# Patient Record
Sex: FEMALE | Race: OTHER | HISPANIC OR LATINO
[De-identification: names, ages, dates, MRNs, and addresses within clinical notes are randomized per-mention and may not be internally consistent; named-entity substitution may affect disease eponyms.]

---

## 2021-11-04 ENCOUNTER — APPOINTMENT (OUTPATIENT)
Dept: ORTHOPEDIC SURGERY | Facility: CLINIC | Age: 86
End: 2021-11-04
Payer: MEDICARE

## 2021-11-04 VITALS
SYSTOLIC BLOOD PRESSURE: 159 MMHG | BODY MASS INDEX: 28.47 KG/M2 | HEART RATE: 81 BPM | DIASTOLIC BLOOD PRESSURE: 69 MMHG | WEIGHT: 145 LBS | HEIGHT: 60 IN

## 2021-11-04 DIAGNOSIS — Z87.19 PERSONAL HISTORY OF OTHER DISEASES OF THE DIGESTIVE SYSTEM: ICD-10-CM

## 2021-11-04 DIAGNOSIS — Z82.49 FAMILY HISTORY OF ISCHEMIC HEART DISEASE AND OTHER DISEASES OF THE CIRCULATORY SYSTEM: ICD-10-CM

## 2021-11-04 DIAGNOSIS — Z78.9 OTHER SPECIFIED HEALTH STATUS: ICD-10-CM

## 2021-11-04 DIAGNOSIS — Z86.79 PERSONAL HISTORY OF OTHER DISEASES OF THE CIRCULATORY SYSTEM: ICD-10-CM

## 2021-11-04 PROBLEM — Z00.00 ENCOUNTER FOR PREVENTIVE HEALTH EXAMINATION: Status: ACTIVE | Noted: 2021-11-04

## 2021-11-04 PROCEDURE — 72082 X-RAY EXAM ENTIRE SPI 2/3 VW: CPT

## 2021-11-04 PROCEDURE — 99204 OFFICE O/P NEW MOD 45 MIN: CPT

## 2021-11-04 NOTE — ASSESSMENT
[FreeTextEntry1] : I had a lengthy discussion with the patient in regards to their treatment plan and diagnosis.  They do have objective weakness findings on my exam.  Their symptoms have persisted despite the conservative management they have attempted thus far.  As a result I would like to proceed with a lumbar MRI.  In tandem with this they should begin physical therapy/home therapy program.  The patient can take Tylenol/NSAIDs as needed for pain control if medically able to.  I will have the patient follow-up in 3 to 4 weeks for repeat clinical evaluation.  I encouraged them to follow-up sooner if their symptoms worsen or change in any way.  Please note that over 45 minutes time spent in care of this patient which includes previsit preparation, in person visit, post visit documentation.

## 2021-11-04 NOTE — PHYSICAL EXAM
[de-identified] : Lumbar Physical Exam\par \par Gait -antalgic gait\par \par Station -forward pitched\par \par Sagittal balance -positive\par \par Compensatory mechanism? -Bent hips and bent knees\par \par \par \par Reflexes\par Patellar - normal\par Gastroc - normal\par Clonus - No\par \par Hip Exam - Normal\par \par Straight leg raise - none\par \par Pulses - 2+ dp/pt\par \par Range of motion - normal\par \par Sensation \par Sensation intact to light touch in L1, L2, L3, L4, L5 and S1 dermatomes bilaterally\par \par Motor\par 	IP	Quad	HS	TA	Gastroc	EHL\par Right	4/5	5/5	5/5	5/5	5/5	5/5\par Left	4/5	5/5	5/5	5/5	5/5	5/5 [de-identified] : Scoliosis radiographs\par Approximately 70 degrees of pelvic incidence\par 55 degrees of lumbar lordosis\par \par Lumbar radiographs\par Multiple areas of disc degeneration and facet arthropathy

## 2021-11-04 NOTE — HISTORY OF PRESENT ILLNESS
[de-identified] : This is an 86-year-old female that is here today for evaluation of her back and leg symptoms.  She complains of bilateral leg symptoms.  Left is worse than her right side.  She complains of left and right anterior thigh pain.  She also complains of left posterior lateral thigh posterior lateral calf pain.  She complains of intermittent cramping in her legs especially whenever she walks for any prolonged amount of time.  At this point she cannot even walk 1 block due to significant pain.  She has had these symptoms for years but over the past 1 year she has had acute worsening of her overall symptoms.  She denies any bowel bladder issues.  She denies any saddle anesthesia.

## 2021-11-09 ENCOUNTER — APPOINTMENT (OUTPATIENT)
Dept: MRI IMAGING | Facility: CLINIC | Age: 86
End: 2021-11-09
Payer: MEDICARE

## 2021-11-09 ENCOUNTER — TRANSCRIPTION ENCOUNTER (OUTPATIENT)
Age: 86
End: 2021-11-09

## 2021-11-09 ENCOUNTER — OUTPATIENT (OUTPATIENT)
Dept: OUTPATIENT SERVICES | Facility: HOSPITAL | Age: 86
LOS: 1 days | End: 2021-11-09
Payer: COMMERCIAL

## 2021-11-09 DIAGNOSIS — M54.9 DORSALGIA, UNSPECIFIED: ICD-10-CM

## 2021-11-09 PROCEDURE — 72148 MRI LUMBAR SPINE W/O DYE: CPT | Mod: 26

## 2021-11-09 PROCEDURE — 72148 MRI LUMBAR SPINE W/O DYE: CPT

## 2021-11-17 ENCOUNTER — APPOINTMENT (OUTPATIENT)
Dept: ORTHOPEDIC SURGERY | Facility: CLINIC | Age: 86
End: 2021-11-17
Payer: MEDICARE

## 2021-11-17 VITALS — BODY MASS INDEX: 28.47 KG/M2 | HEIGHT: 60 IN | WEIGHT: 145 LBS

## 2021-11-17 PROCEDURE — 99214 OFFICE O/P EST MOD 30 MIN: CPT

## 2021-11-17 NOTE — HISTORY OF PRESENT ILLNESS
[de-identified] : Today the patient states that overall her symptoms have improved.  She still does have bilateral posterior thigh posterior calf pain.  Left side is worse than her right side.  She denies any bowel bladder issues.  She denies any saddle anesthesia.  She still has decreased walking tolerance.  She is here today to discuss next steps in care.\par \par 11/04/21\par This is an 86-year-old female that is here today for evaluation of her back and leg symptoms.  She complains of bilateral leg symptoms.  Left is worse than her right side.  She complains of left and right anterior thigh pain.  She also complains of left posterior lateral thigh posterior lateral calf pain.  She complains of intermittent cramping in her legs especially whenever she walks for any prolonged amount of time.  At this point she cannot even walk 1 block due to significant pain.  She has had these symptoms for years but over the past 1 year she has had acute worsening of her overall symptoms.  She denies any bowel bladder issues.  She denies any saddle anesthesia.

## 2021-11-17 NOTE — PHYSICAL EXAM
[de-identified] : Lumbar Physical Exam\par \par Gait -antalgic gait\par \par Station -forward pitched\par \par Sagittal balance -positive\par \par Compensatory mechanism? -Bent hips and bent knees\par \par \par \par Reflexes\par Patellar - normal\par Gastroc - normal\par Clonus - No\par \par Hip Exam - Normal\par \par Straight leg raise - none\par \par Pulses - 2+ dp/pt\par \par Range of motion - normal\par \par Sensation \par Sensation intact to light touch in L1, L2, L3, L4, L5 and S1 dermatomes bilaterally\par \par Motor\par 	IP	Quad	HS	TA	Gastroc	EHL\par Right	4/5	5/5	5/5	5/5	5/5	5/5\par Left	4/5	5/5	5/5	5/5	5/5	5/5 [de-identified] : Scoliosis radiographs\par Approximately 70 degrees of pelvic incidence\par 55 degrees of lumbar lordosis\par \par Lumbar radiographs\par Multiple areas of disc degeneration and facet arthropathy\par \par Lumbar MRI\par L5-S1  spondylolisthesis\par Moderate to severe spinal stenosis at L5-S1

## 2021-11-17 NOTE — ASSESSMENT
[FreeTextEntry1] : I will lengthy discussion with the patient and the patient's family regards to her treatment options.  At this point the patient has no red flag symptoms and would like to pursue conservative management.  I think this is reasonable.  He will proceed with an interlaminar epidural steroid injection to help with her radiculopathy.  I would encourage her to continue to ambulate.  I would encourage her to work with physical therapy.  I will see her again in 4 to 6 weeks for repeat clinical evaluation.  I encouraged her to follow-up sooner if she has any new or worsening symptoms.

## 2021-12-28 ENCOUNTER — APPOINTMENT (OUTPATIENT)
Dept: INTERNAL MEDICINE | Facility: CLINIC | Age: 86
End: 2021-12-28

## 2022-01-05 ENCOUNTER — APPOINTMENT (OUTPATIENT)
Dept: ORTHOPEDIC SURGERY | Facility: CLINIC | Age: 87
End: 2022-01-05

## 2022-01-11 ENCOUNTER — NON-APPOINTMENT (OUTPATIENT)
Age: 87
End: 2022-01-11

## 2022-01-13 ENCOUNTER — APPOINTMENT (OUTPATIENT)
Dept: RADIOLOGY | Facility: CLINIC | Age: 87
End: 2022-01-13
Payer: MEDICARE

## 2022-01-13 ENCOUNTER — RESULT REVIEW (OUTPATIENT)
Age: 87
End: 2022-01-13

## 2022-01-13 ENCOUNTER — OUTPATIENT (OUTPATIENT)
Dept: OUTPATIENT SERVICES | Facility: HOSPITAL | Age: 87
LOS: 1 days | End: 2022-01-13
Payer: COMMERCIAL

## 2022-01-13 DIAGNOSIS — M54.9 DORSALGIA, UNSPECIFIED: ICD-10-CM

## 2022-01-13 PROCEDURE — 62323 NJX INTERLAMINAR LMBR/SAC: CPT

## 2022-01-13 PROCEDURE — 72275: CPT

## 2022-01-19 ENCOUNTER — APPOINTMENT (OUTPATIENT)
Dept: INTERNAL MEDICINE | Facility: CLINIC | Age: 87
End: 2022-01-19
Payer: MEDICARE

## 2022-01-19 ENCOUNTER — NON-APPOINTMENT (OUTPATIENT)
Age: 87
End: 2022-01-19

## 2022-01-19 VITALS
DIASTOLIC BLOOD PRESSURE: 74 MMHG | WEIGHT: 150 LBS | RESPIRATION RATE: 16 BRPM | HEART RATE: 75 BPM | HEIGHT: 60 IN | BODY MASS INDEX: 29.45 KG/M2 | SYSTOLIC BLOOD PRESSURE: 130 MMHG | OXYGEN SATURATION: 99 % | TEMPERATURE: 94 F

## 2022-01-19 DIAGNOSIS — Z76.89 PERSONS ENCOUNTERING HEALTH SERVICES IN OTHER SPECIFIED CIRCUMSTANCES: ICD-10-CM

## 2022-01-19 LAB
25(OH)D3 SERPL-MCNC: 35.9 NG/ML
ALBUMIN SERPL ELPH-MCNC: 4.6 G/DL
ALP BLD-CCNC: 108 U/L
ALT SERPL-CCNC: 11 U/L
ANION GAP SERPL CALC-SCNC: 13 MMOL/L
AST SERPL-CCNC: 13 U/L
BASOPHILS # BLD AUTO: 0.06 K/UL
BASOPHILS NFR BLD AUTO: 0.9 %
BILIRUB SERPL-MCNC: 0.6 MG/DL
BUN SERPL-MCNC: 14 MG/DL
CALCIUM SERPL-MCNC: 9.8 MG/DL
CHLORIDE SERPL-SCNC: 93 MMOL/L
CHOLEST SERPL-MCNC: 182 MG/DL
CO2 SERPL-SCNC: 23 MMOL/L
CREAT SERPL-MCNC: 0.73 MG/DL
EOSINOPHIL # BLD AUTO: 0.01 K/UL
EOSINOPHIL NFR BLD AUTO: 0.1 %
GLUCOSE SERPL-MCNC: 103 MG/DL
HCT VFR BLD CALC: 34.7 %
HDLC SERPL-MCNC: 53 MG/DL
HGB BLD-MCNC: 11.4 G/DL
IMM GRANULOCYTES NFR BLD AUTO: 0.3 %
LDLC SERPL CALC-MCNC: 117 MG/DL
LYMPHOCYTES # BLD AUTO: 2.41 K/UL
LYMPHOCYTES NFR BLD AUTO: 36.1 %
MAN DIFF?: NORMAL
MCHC RBC-ENTMCNC: 27.3 PG
MCHC RBC-ENTMCNC: 32.9 GM/DL
MCV RBC AUTO: 83.2 FL
MONOCYTES # BLD AUTO: 0.66 K/UL
MONOCYTES NFR BLD AUTO: 9.9 %
NEUTROPHILS # BLD AUTO: 3.52 K/UL
NEUTROPHILS NFR BLD AUTO: 52.7 %
NONHDLC SERPL-MCNC: 129 MG/DL
PLATELET # BLD AUTO: 305 K/UL
POTASSIUM SERPL-SCNC: 5.2 MMOL/L
PROT SERPL-MCNC: 7.2 G/DL
RBC # BLD: 4.17 M/UL
RBC # FLD: 14.4 %
SODIUM SERPL-SCNC: 129 MMOL/L
T4 FREE SERPL-MCNC: 1.4 NG/DL
TRIGL SERPL-MCNC: 56 MG/DL
TSH SERPL-ACNC: 2.46 UIU/ML
VIT B12 SERPL-MCNC: 474 PG/ML
WBC # FLD AUTO: 6.68 K/UL

## 2022-01-19 PROCEDURE — G0439: CPT

## 2022-01-19 RX ORDER — LOSARTAN POTASSIUM 100 MG/1
TABLET, FILM COATED ORAL
Refills: 0 | Status: DISCONTINUED | COMMUNITY
End: 2022-01-19

## 2022-01-19 NOTE — HISTORY OF PRESENT ILLNESS
[FreeTextEntry1] : establish care [de-identified] : 86 years old female here for first time to establish care changed PCP, refers h/o HTN, GASTRITIS, chronic back pain and chronic cough, refers also GERD

## 2022-01-19 NOTE — HEALTH RISK ASSESSMENT
[Good] : ~his/her~  mood as  good [Never] : Never [No] : No [No falls in past year] : Patient reported no falls in the past year [0] : 1) Little interest or pleasure doing things: Not at all (0) [1] : 2) Feeling down, depressed, or hopeless for several days (1) [PHQ-2 Negative - No further assessment needed] : PHQ-2 Negative - No further assessment needed [HIV test declined] : HIV test declined [Hepatitis C test declined] : Hepatitis C test declined [None] : None [With Family] : lives with family [# of Members in Household ___] :  household currently consist of [unfilled] member(s) [Retired] : retired [High School] : high school [] :  [Feels Safe at Home] : Feels safe at home [Fully functional (bathing, dressing, toileting, transferring, walking, feeding)] : Fully functional (bathing, dressing, toileting, transferring, walking, feeding) [Fully functional (using the telephone, shopping, preparing meals, housekeeping, doing laundry, using] : Fully functional and needs no help or supervision to perform IADLs (using the telephone, shopping, preparing meals, housekeeping, doing laundry, using transportation, managing medications and managing finances) [Reports changes in hearing] : Reports changes in hearing [Reports changes in vision] : Reports changes in vision [Smoke Detector] : smoke detector [Carbon Monoxide Detector] : carbon monoxide detector [Seat Belt] :  uses seat belt [YBD1Urial] : 1 [Change in mental status noted] : No change in mental status noted [Sexually Active] : not sexually active [Reports changes in dental health] : Reports no changes in dental health [Guns at Home] : no guns at home [de-identified] : daughter

## 2022-01-19 NOTE — ASSESSMENT
[FreeTextEntry1] : 1. annual physical exam\par * routine general labs done\par * age appropriate health maintenance recommendations reviewed, discussed including healthy diet, regular exercise\par 2. hypertension\par * continue losartan- hctz \par * low sodium diet reinforced\par 3. chronic cough\par * likely due to esophageal reflux\par * trial of benzonatate at bedtime\par 4. GERD\par * antireflux measures discussed, recommended\par * continue omeprazole 40 mg daily\par 5. chronic low back pain\par * patient had lumbar epidural injection by orthopedic spine surgeon; to follow up\par *follow up in one month for lab test results review

## 2022-01-20 LAB
APPEARANCE: CLEAR
BACTERIA: ABNORMAL
BILIRUBIN URINE: NEGATIVE
BLOOD URINE: NEGATIVE
COLOR: NORMAL
ESTIMATED AVERAGE GLUCOSE: 123 MG/DL
GLUCOSE QUALITATIVE U: NEGATIVE
HBA1C MFR BLD HPLC: 5.9 %
HYALINE CASTS: 0 /LPF
KETONES URINE: NEGATIVE
LEUKOCYTE ESTERASE URINE: NEGATIVE
MICROSCOPIC-UA: NORMAL
NITRITE URINE: NEGATIVE
PH URINE: 8
PROTEIN URINE: NEGATIVE
RED BLOOD CELLS URINE: 7 /HPF
SPECIFIC GRAVITY URINE: 1.01
SQUAMOUS EPITHELIAL CELLS: 3 /HPF
UROBILINOGEN URINE: NORMAL
WHITE BLOOD CELLS URINE: 1 /HPF

## 2022-01-25 ENCOUNTER — NON-APPOINTMENT (OUTPATIENT)
Age: 87
End: 2022-01-25

## 2022-01-25 ENCOUNTER — APPOINTMENT (OUTPATIENT)
Dept: OPHTHALMOLOGY | Facility: CLINIC | Age: 87
End: 2022-01-25
Payer: MEDICARE

## 2022-01-25 PROCEDURE — 92004 COMPRE OPH EXAM NEW PT 1/>: CPT

## 2022-01-25 PROCEDURE — 92133 CPTRZD OPH DX IMG PST SGM ON: CPT

## 2022-02-01 ENCOUNTER — APPOINTMENT (OUTPATIENT)
Dept: OPHTHALMOLOGY | Facility: CLINIC | Age: 87
End: 2022-02-01

## 2022-03-25 ENCOUNTER — APPOINTMENT (OUTPATIENT)
Dept: ORTHOPEDIC SURGERY | Facility: CLINIC | Age: 87
End: 2022-03-25
Payer: MEDICARE

## 2022-03-25 VITALS — BODY MASS INDEX: 29.45 KG/M2 | HEIGHT: 60 IN | WEIGHT: 150 LBS

## 2022-03-25 VITALS — HEIGHT: 60 IN | WEIGHT: 150 LBS | BODY MASS INDEX: 29.45 KG/M2

## 2022-03-25 PROCEDURE — 99214 OFFICE O/P EST MOD 30 MIN: CPT

## 2022-03-25 NOTE — PHYSICAL EXAM
[de-identified] : Lumbar Physical Exam\par \par Gait -antalgic gait\par \par Station -forward pitched\par \par Sagittal balance -positive\par \par Compensatory mechanism? -Bent hips and bent knees\par \par \par \par Reflexes\par Patellar - normal\par Gastroc - normal\par Clonus - No\par \par Hip Exam - Normal\par \par Straight leg raise - none\par \par Pulses - 2+ dp/pt\par \par Range of motion - normal\par \par Sensation \par Sensation intact to light touch in L1, L2, L3, L4, L5 and S1 dermatomes bilaterally\par \par Motor\par 	IP	Quad	HS	TA	Gastroc	EHL\par Right	4/5	5/5	5/5	5/5	5/5	5/5\par Left	4/5	5/5	5/5	5/5	5/5	5/5 [de-identified] : Scoliosis radiographs\par Approximately 70 degrees of pelvic incidence\par 55 degrees of lumbar lordosis\par \par Lumbar radiographs\par Multiple areas of disc degeneration and facet arthropathy\par \par Lumbar MRI\par L5-S1  spondylolisthesis\par Moderate to severe spinal stenosis at L5-S1

## 2022-03-25 NOTE — ASSESSMENT
[FreeTextEntry1] : Today I did have a lengthy discussion with the patient in regards to her treatment plan and diagnosis.  In particular we discussed both operative and nonoperative modalities of care.  She has a lot of fear in regards to surgery.  She would prefer to continue with injections.  Given her age and her symptoms I do think continued conservative management is reasonable.  I will prescribe another epidural steroid injection.  Her family inquired about getting the epidural steroid injection orders without coming to see me.  Again given her age and her overall fairly benign symptoms currently I think it is reasonable to simply place that order for injection without having her to come see me.  I did  him that if her symptoms worsen or change or if she stops getting pain relief from the injection then I think she would be better served with surgery.  Family was appreciative of this plan.  We will have her follow-up in 3 months for repeat clinical evaluation.  They know to reach out to me at any point if her symptoms worsen or change in any way.

## 2022-03-25 NOTE — HISTORY OF PRESENT ILLNESS
[de-identified] : Today the patient states that she has had a return of her lower extremity symptoms.  She does have pain which radiates down her posterior thigh bilaterally, left worse than right.  She recently had an injection in January which did help her symptoms significantly.  Unfortunately her symptoms have returned.  She denies any bowel bladder issues.  She denies any saddle anesthesia.\par \par 11/17/21\par Today the patient states that overall her symptoms have improved.  She still does have bilateral posterior thigh posterior calf pain.  Left side is worse than her right side.  She denies any bowel bladder issues.  She denies any saddle anesthesia.  She still has decreased walking tolerance.  She is here today to discuss next steps in care.\par \par 11/04/21\par This is an 86-year-old female that is here today for evaluation of her back and leg symptoms.  She complains of bilateral leg symptoms.  Left is worse than her right side.  She complains of left and right anterior thigh pain.  She also complains of left posterior lateral thigh posterior lateral calf pain.  She complains of intermittent cramping in her legs especially whenever she walks for any prolonged amount of time.  At this point she cannot even walk 1 block due to significant pain.  She has had these symptoms for years but over the past 1 year she has had acute worsening of her overall symptoms.  She denies any bowel bladder issues.  She denies any saddle anesthesia.

## 2022-03-30 ENCOUNTER — APPOINTMENT (OUTPATIENT)
Dept: PAIN MANAGEMENT | Facility: CLINIC | Age: 87
End: 2022-03-30

## 2022-04-12 ENCOUNTER — NON-APPOINTMENT (OUTPATIENT)
Age: 87
End: 2022-04-12

## 2022-04-12 ENCOUNTER — RESULT REVIEW (OUTPATIENT)
Age: 87
End: 2022-04-12

## 2022-04-12 ENCOUNTER — APPOINTMENT (OUTPATIENT)
Dept: RADIOLOGY | Facility: CLINIC | Age: 87
End: 2022-04-12
Payer: MEDICARE

## 2022-04-12 ENCOUNTER — OUTPATIENT (OUTPATIENT)
Dept: OUTPATIENT SERVICES | Facility: HOSPITAL | Age: 87
LOS: 1 days | End: 2022-04-12
Payer: COMMERCIAL

## 2022-04-12 DIAGNOSIS — M54.9 DORSALGIA, UNSPECIFIED: ICD-10-CM

## 2022-04-12 DIAGNOSIS — Z00.8 ENCOUNTER FOR OTHER GENERAL EXAMINATION: ICD-10-CM

## 2022-04-12 PROCEDURE — 62323 NJX INTERLAMINAR LMBR/SAC: CPT

## 2022-05-19 ENCOUNTER — APPOINTMENT (OUTPATIENT)
Dept: SURGERY | Facility: CLINIC | Age: 87
End: 2022-05-19
Payer: MEDICARE

## 2022-05-19 VITALS
HEIGHT: 60 IN | SYSTOLIC BLOOD PRESSURE: 167 MMHG | HEART RATE: 71 BPM | BODY MASS INDEX: 31.8 KG/M2 | DIASTOLIC BLOOD PRESSURE: 86 MMHG | OXYGEN SATURATION: 98 % | WEIGHT: 162 LBS

## 2022-05-19 PROCEDURE — 99204 OFFICE O/P NEW MOD 45 MIN: CPT

## 2022-05-23 ENCOUNTER — APPOINTMENT (OUTPATIENT)
Dept: GASTROENTEROLOGY | Facility: CLINIC | Age: 87
End: 2022-05-23
Payer: MEDICARE

## 2022-05-23 DIAGNOSIS — R11.10 VOMITING, UNSPECIFIED: ICD-10-CM

## 2022-05-23 PROCEDURE — 99204 OFFICE O/P NEW MOD 45 MIN: CPT

## 2022-05-23 NOTE — REASON FOR VISIT
[Initial Consultation] : an initial consultation for the evaluation of [Abdominal Bloating and Discomfort] : abdominal bloating and discomfort [GERD] : gastroesophageal reflux disease [Family Member] : family member [Other: _____] : [unfilled] [FreeTextEntry2] : cough

## 2022-05-23 NOTE — CONSULT LETTER
[Dear  ___] : Dear  [unfilled], [Consult Letter:] : I had the pleasure of evaluating your patient, [unfilled]. [Consult Closing:] : Thank you very much for allowing me to participate in the care of this patient.  If you have any questions, please do not hesitate to contact me. [Sincerely,] : Sincerely, [FreeTextEntry1] : Impression: The patient is a 86 year old woman who is referred for an initial Gastroenterology motility consultation for the evaluation of progressive typical reflux symptoms, including heartburn and regurgitation. \par \par Plan:\par \par 1) Gastroenterology: Ms. DOMINGUEZ's symptoms are typical of gastroesophageal reflux disease and she has had some response to acid suppressive therapy in the past, which is further suggestive of ongoing gastroesophageal reflux.\par \par At the present time, we will proceed with scheduling an EGD. We will plan to place a Bravo pH monitor at that time to objectively define if reflux is present and if so, to what degree.  We will perform Bravo pH testing off of PPI therapy, which will be held for 5-7 days.\par \par If the patient is willing, we may also consider HRM with 24 hr pH/Impedance testing, as we will not only be able to objectively quantify the degree of reflux, but will further be able to evaluate impedance and non-acid reflux episodes as a contributor to his symptoms, which we will not be able to do based on Bravo pH testing alone.\par \par At today's visit, we also extensively counseled her on dietary/lifestyle management for presumed GERD.  We have also provided her with an educational handout with detailed information.\par \par 2) Disposition: Ms. DOMINGUEZ will return to see me in 4-6 weeks after the above studies are obtained. My office will be in touch with her as results become available.  \par \par In the meantime, she should not hesitate to contact my office with any additional questions. [FreeTextEntry3] : Sweta Astorga MD\par Gastroenterology, Hepatology and Motility\par

## 2022-06-02 ENCOUNTER — RESULT REVIEW (OUTPATIENT)
Age: 87
End: 2022-06-02

## 2022-06-02 ENCOUNTER — OUTPATIENT (OUTPATIENT)
Dept: OUTPATIENT SERVICES | Facility: HOSPITAL | Age: 87
LOS: 1 days | End: 2022-06-02
Payer: COMMERCIAL

## 2022-06-02 DIAGNOSIS — K21.9 GASTRO-ESOPHAGEAL REFLUX DISEASE WITHOUT ESOPHAGITIS: ICD-10-CM

## 2022-06-02 PROCEDURE — 74220 X-RAY XM ESOPHAGUS 1CNTRST: CPT

## 2022-06-02 PROCEDURE — 74220 X-RAY XM ESOPHAGUS 1CNTRST: CPT | Mod: 26

## 2022-06-08 ENCOUNTER — NON-APPOINTMENT (OUTPATIENT)
Age: 87
End: 2022-06-08

## 2022-06-08 ENCOUNTER — APPOINTMENT (OUTPATIENT)
Dept: CARDIOLOGY | Facility: CLINIC | Age: 87
End: 2022-06-08
Payer: MEDICARE

## 2022-06-08 VITALS
SYSTOLIC BLOOD PRESSURE: 163 MMHG | HEART RATE: 79 BPM | OXYGEN SATURATION: 98 % | WEIGHT: 164 LBS | DIASTOLIC BLOOD PRESSURE: 80 MMHG | BODY MASS INDEX: 32.2 KG/M2 | HEIGHT: 60 IN

## 2022-06-08 VITALS — SYSTOLIC BLOOD PRESSURE: 140 MMHG | DIASTOLIC BLOOD PRESSURE: 78 MMHG

## 2022-06-08 DIAGNOSIS — R06.00 DYSPNEA, UNSPECIFIED: ICD-10-CM

## 2022-06-08 PROCEDURE — 93000 ELECTROCARDIOGRAM COMPLETE: CPT

## 2022-06-08 PROCEDURE — 99204 OFFICE O/P NEW MOD 45 MIN: CPT | Mod: 25

## 2022-06-08 NOTE — DISCUSSION/SUMMARY
[FreeTextEntry1] : 86 year woman with a history as listed presents for an initial cardiac evaluation. \par Shirin is complaining of a chronic cough. She may be having a reaction to her losartan. She will stop this. She previously had lower extremity edema to Norvasc. She will start on Bystolic 10mg Qday. She will continue HCTZ. \par SHe has HUTCHINSON which is likely from deconditioning. Her EKG did not reveal any significant ischemic changes. She will undergo a pharmacological nuclear stress test to assess for underlying obstructive CAD. She will get a 2d echo to assess for any  new structural heart disease, changes in valvular and ventricular function. \par Exercise and diet counseling was performed in order to reduce her future cardiovascular risk.\par She will followup with me in 3 months or sooner if necessary.

## 2022-06-08 NOTE — HISTORY OF PRESENT ILLNESS
[FreeTextEntry1] : 86 year old woman with a history of HTN, sciatica, GERD presents for an initial cardiac evaluation. \par \par She has been complaining of a chronic cough that has been going on for years. She has been having fits of coughing daily. \par She is is relatively sedentary because of her spinal disease. She complains  of HUTCHINSON. She   denies any chest pain, PND, orthopnea, lower extremity edema, near syncope, syncope, strokelike symptoms. Medication reconciliation performed. She is compliant with her medications.

## 2022-06-13 ENCOUNTER — NON-APPOINTMENT (OUTPATIENT)
Age: 87
End: 2022-06-13

## 2022-06-13 ENCOUNTER — APPOINTMENT (OUTPATIENT)
Dept: GASTROENTEROLOGY | Facility: CLINIC | Age: 87
End: 2022-06-13
Payer: MEDICARE

## 2022-06-13 ENCOUNTER — APPOINTMENT (OUTPATIENT)
Dept: PULMONOLOGY | Facility: CLINIC | Age: 87
End: 2022-06-13

## 2022-06-13 DIAGNOSIS — Z01.812 ENCOUNTER FOR PREPROCEDURAL LABORATORY EXAMINATION: ICD-10-CM

## 2022-06-13 PROCEDURE — 99443: CPT

## 2022-06-16 ENCOUNTER — APPOINTMENT (OUTPATIENT)
Dept: CARDIOLOGY | Facility: CLINIC | Age: 87
End: 2022-06-16
Payer: MEDICARE

## 2022-06-16 PROCEDURE — 93306 TTE W/DOPPLER COMPLETE: CPT

## 2022-06-17 ENCOUNTER — NON-APPOINTMENT (OUTPATIENT)
Age: 87
End: 2022-06-17

## 2022-06-20 ENCOUNTER — APPOINTMENT (OUTPATIENT)
Dept: CARDIOLOGY | Facility: CLINIC | Age: 87
End: 2022-06-20
Payer: MEDICARE

## 2022-06-20 PROCEDURE — 78452 HT MUSCLE IMAGE SPECT MULT: CPT

## 2022-06-20 PROCEDURE — 93015 CV STRESS TEST SUPVJ I&R: CPT

## 2022-06-20 PROCEDURE — A9500: CPT

## 2022-06-27 ENCOUNTER — RX RENEWAL (OUTPATIENT)
Age: 87
End: 2022-06-27

## 2022-07-06 LAB — SARS-COV-2 N GENE NPH QL NAA+PROBE: NOT DETECTED

## 2022-07-08 ENCOUNTER — APPOINTMENT (OUTPATIENT)
Dept: GASTROENTEROLOGY | Facility: HOSPITAL | Age: 87
End: 2022-07-08

## 2022-07-08 ENCOUNTER — TRANSCRIPTION ENCOUNTER (OUTPATIENT)
Age: 87
End: 2022-07-08

## 2022-07-08 ENCOUNTER — OUTPATIENT (OUTPATIENT)
Dept: OUTPATIENT SERVICES | Facility: HOSPITAL | Age: 87
LOS: 1 days | End: 2022-07-08
Payer: COMMERCIAL

## 2022-07-08 VITALS
HEIGHT: 60 IN | TEMPERATURE: 98 F | HEART RATE: 67 BPM | RESPIRATION RATE: 16 BRPM | SYSTOLIC BLOOD PRESSURE: 187 MMHG | WEIGHT: 160.06 LBS | OXYGEN SATURATION: 98 % | DIASTOLIC BLOOD PRESSURE: 77 MMHG

## 2022-07-08 DIAGNOSIS — R05.3 CHRONIC COUGH: ICD-10-CM

## 2022-07-08 DIAGNOSIS — K21.9 GASTRO-ESOPHAGEAL REFLUX DISEASE WITHOUT ESOPHAGITIS: ICD-10-CM

## 2022-07-08 PROCEDURE — 91010 ESOPHAGUS MOTILITY STUDY: CPT

## 2022-07-08 PROCEDURE — 91037 ESOPH IMPED FUNCTION TEST: CPT

## 2022-07-08 PROCEDURE — 91037 ESOPH IMPED FUNCTION TEST: CPT | Mod: 26

## 2022-07-08 PROCEDURE — 91010 ESOPHAGUS MOTILITY STUDY: CPT | Mod: 26

## 2022-07-08 NOTE — ASU DISCHARGE PLAN (ADULT/PEDIATRIC) - HISTORY OF COVID-19 VACCINATION
Patient has the following symptoms: dizziness/ patient fell,  caught  The symptoms have been present for 2 weeks    Pharmacy has been verified.      hard to understand  
Spoke with pt's . Pt continues to feel dizziness \"room is spinning\".   Pt had a dizzy spell where she felt like she was going to fall.  caught patient, denies hitting head.   states patient currently feels okay.  Pt seen in ED for dizziness and left shoulder pain. Pt had a normal CT of chest, EKG, and labs.  Advised to see PCP if dizziness not better. Appointment made for Thursday at 1000.  Offered earlier appointment, but  wanted a morning appointment.     
No

## 2022-07-08 NOTE — ASU PATIENT PROFILE, ADULT - FALL HARM RISK - HARM RISK INTERVENTIONS

## 2022-07-08 NOTE — ASU PATIENT PROFILE, ADULT - ADDITIONAL COMMENTS
patient refused  . would rather daughter patient used  for procedure only . Daughter was  pre procedure.

## 2022-07-08 NOTE — ASU PATIENT PROFILE, ADULT - NS TRANSFER PATIENT BELONGINGS
with patient in pocketbook/Cell Phone/PDA (specify)/Jewelry/Money (specify)/Other belongings/Clothing

## 2022-07-08 NOTE — PRE PROCEDURE NOTE - PRE PROCEDURE EVALUATION
Attending Physician:              Tano Floyd MD MSEd  Performing provider:            ERNIE Mathis  Indication for Procedure:      Chronic GERD                PAST MEDICAL & SURGICAL HISTORY:        See Allscripts Note for further details  ALLERGIES:  No Known Allergies    HOME MEDICATIONS:      See Allscripts Note for further details    AICD/PPM: [ ] yes   [ ] no    PERTINENT LAB DATA:                      PHYSICAL EXAMINATION:    T(C): --  HR: --  BP: --  RR: --  SpO2: --    Constitutional: NAD  HEENT: PERRLA, EOMI,    Neck:  No JVD  Respiratory: CTAB/L  Cardiovascular: S1 and S2  Gastrointestinal: BS+, soft, NT/ND  Extremities: No peripheral edema  Neurological: A/O x 3, no focal deficits  Psychiatric: Normal mood, normal affect  Skin: No rashes    ASA Class: I [ ]  II [X ]  III [ ]  IV [ ]    COMMENTS:    The patient is a suitable candidate for the planned procedure unless box checked [ ]  No, explain:

## 2022-07-08 NOTE — ASU PATIENT PROFILE, ADULT - LANGUAGE ASSISTANCE NEEDED
No-Patient/Caregiver offered and refused free interpretation services. Patient did use  for procedure. [id number] [place].  during procedure./No-Patient/Caregiver offered and refused free interpretation services.

## 2022-07-11 ENCOUNTER — APPOINTMENT (OUTPATIENT)
Dept: PULMONOLOGY | Facility: CLINIC | Age: 87
End: 2022-07-11

## 2022-07-11 VITALS
HEART RATE: 74 BPM | SYSTOLIC BLOOD PRESSURE: 168 MMHG | HEIGHT: 60 IN | OXYGEN SATURATION: 98 % | BODY MASS INDEX: 32.2 KG/M2 | WEIGHT: 164 LBS | DIASTOLIC BLOOD PRESSURE: 84 MMHG

## 2022-07-11 DIAGNOSIS — R05.3 CHRONIC COUGH: ICD-10-CM

## 2022-07-11 PROBLEM — I10 ESSENTIAL (PRIMARY) HYPERTENSION: Chronic | Status: ACTIVE | Noted: 2022-07-08

## 2022-07-11 PROBLEM — K21.9 GASTRO-ESOPHAGEAL REFLUX DISEASE WITHOUT ESOPHAGITIS: Chronic | Status: ACTIVE | Noted: 2022-07-08

## 2022-07-11 PROCEDURE — 94010 BREATHING CAPACITY TEST: CPT

## 2022-07-11 PROCEDURE — ZZZZZ: CPT

## 2022-07-11 PROCEDURE — 99204 OFFICE O/P NEW MOD 45 MIN: CPT | Mod: 25

## 2022-07-11 PROCEDURE — 71046 X-RAY EXAM CHEST 2 VIEWS: CPT

## 2022-07-11 RX ORDER — BENZONATATE 100 MG/1
100 CAPSULE ORAL 3 TIMES DAILY
Qty: 21 | Refills: 0 | Status: DISCONTINUED | COMMUNITY
Start: 2022-01-19 | End: 2022-07-11

## 2022-07-11 RX ORDER — DICLOFENAC SODIUM 1% 10 MG/G
1 GEL TOPICAL DAILY
Qty: 1 | Refills: 0 | Status: DISCONTINUED | COMMUNITY
Start: 2022-03-25 | End: 2022-07-11

## 2022-07-11 NOTE — ASSESSMENT
[FreeTextEntry1] : I suggest she make the changes as discussed with her cardiologist and we can reassess in 2 mo. if cough has not improved.\par \par cont ppi as well

## 2022-07-11 NOTE — PROCEDURE
[FreeTextEntry1] : spirometry: had difficulty with exam but overall appears normal.\par \par CXR: clear lungs, no focal consolidation or pleural effusions, cardiac silhouette appears normal.  No bony abnormality.\par

## 2022-07-11 NOTE — CONSULT LETTER
[FreeTextEntry1] : Dear  ,\par \par I had the pleasure of evaluating your patient, BASSEM DOMINGUEZ today in pulmonary consultation.  Please refer to my attached note for my findings and recommendations.\par \par \par Thank you for allowing me to participate in the care of your patient, please feel free to call with any questions or concerns.\par \par \par Sincerely,\par \par Humera Angulo MD\par [facility] \par [facility]\par \par

## 2022-07-11 NOTE — HISTORY OF PRESENT ILLNESS
[Never] : never [TextBox_4] : 86F with HTN and Gerd, hiatal hernia\par \par c/o chronic cough\par no obvious trigger\par no cp/wheeze/lopez\par reports she is on losartan and omeprazole but per my chart review, during her june 8th visit with cardiology it was suggested she stop losartan bc of the cough and start bystolic and hctz.  She states she made the swithc but only for 2 days bc she felt like she was coughing more so went back to losartan only.

## 2022-07-12 ENCOUNTER — NON-APPOINTMENT (OUTPATIENT)
Age: 87
End: 2022-07-12

## 2022-08-01 LAB — SARS-COV-2 N GENE NPH QL NAA+PROBE: NOT DETECTED

## 2022-08-03 ENCOUNTER — RESULT REVIEW (OUTPATIENT)
Age: 87
End: 2022-08-03

## 2022-08-03 ENCOUNTER — APPOINTMENT (OUTPATIENT)
Dept: GASTROENTEROLOGY | Facility: HOSPITAL | Age: 87
End: 2022-08-03

## 2022-08-03 ENCOUNTER — OUTPATIENT (OUTPATIENT)
Dept: OUTPATIENT SERVICES | Facility: HOSPITAL | Age: 87
LOS: 1 days | End: 2022-08-03
Payer: COMMERCIAL

## 2022-08-03 ENCOUNTER — TRANSCRIPTION ENCOUNTER (OUTPATIENT)
Age: 87
End: 2022-08-03

## 2022-08-03 VITALS — HEART RATE: 60 BPM | DIASTOLIC BLOOD PRESSURE: 68 MMHG | SYSTOLIC BLOOD PRESSURE: 161 MMHG | RESPIRATION RATE: 12 BRPM

## 2022-08-03 VITALS
RESPIRATION RATE: 18 BRPM | OXYGEN SATURATION: 98 % | TEMPERATURE: 98 F | WEIGHT: 160.06 LBS | SYSTOLIC BLOOD PRESSURE: 148 MMHG | HEART RATE: 59 BPM | HEIGHT: 59 IN | DIASTOLIC BLOOD PRESSURE: 76 MMHG

## 2022-08-03 DIAGNOSIS — S82.892A OTHER FRACTURE OF LEFT LOWER LEG, INITIAL ENCOUNTER FOR CLOSED FRACTURE: Chronic | ICD-10-CM

## 2022-08-03 DIAGNOSIS — K21.9 GASTRO-ESOPHAGEAL REFLUX DISEASE WITHOUT ESOPHAGITIS: ICD-10-CM

## 2022-08-03 DIAGNOSIS — R05.3 CHRONIC COUGH: ICD-10-CM

## 2022-08-03 PROCEDURE — 88305 TISSUE EXAM BY PATHOLOGIST: CPT | Mod: 26

## 2022-08-03 PROCEDURE — 43239 EGD BIOPSY SINGLE/MULTIPLE: CPT

## 2022-08-03 PROCEDURE — C1889: CPT

## 2022-08-03 PROCEDURE — 88305 TISSUE EXAM BY PATHOLOGIST: CPT

## 2022-08-03 DEVICE — NET RETRV ROT ROTH 2.5MMX230CM: Type: IMPLANTABLE DEVICE | Status: FUNCTIONAL

## 2022-08-03 DEVICE — KIT CVC 2LUM MAC 9FR CHG: Type: IMPLANTABLE DEVICE | Status: FUNCTIONAL

## 2022-08-03 DEVICE — IMPLANTABLE DEVICE: Type: IMPLANTABLE DEVICE | Status: FUNCTIONAL

## 2022-08-03 DEVICE — CATH THERMODIL PACE 7.5FR: Type: IMPLANTABLE DEVICE | Status: FUNCTIONAL

## 2022-08-03 DEVICE — KIT A-LINE 1LUM 20G X 12CM SAFE KIT: Type: IMPLANTABLE DEVICE | Status: FUNCTIONAL

## 2022-08-03 RX ORDER — LIDOCAINE HCL 20 MG/ML
4 VIAL (ML) INJECTION ONCE
Refills: 0 | Status: DISCONTINUED | OUTPATIENT
Start: 2022-08-03 | End: 2022-08-18

## 2022-08-03 RX ORDER — SODIUM CHLORIDE 9 MG/ML
500 INJECTION INTRAMUSCULAR; INTRAVENOUS; SUBCUTANEOUS
Refills: 0 | Status: DISCONTINUED | OUTPATIENT
Start: 2022-08-03 | End: 2022-08-18

## 2022-08-03 RX ORDER — LOSARTAN POTASSIUM 100 MG/1
12.5 TABLET, FILM COATED ORAL
Qty: 0 | Refills: 0 | DISCHARGE

## 2022-08-03 NOTE — ASU PATIENT PROFILE, ADULT - FALL HARM RISK - UNIVERSAL INTERVENTIONS
Bed in lowest position, wheels locked, appropriate side rails in place/Call bell, personal items and telephone in reach/Instruct patient to call for assistance before getting out of bed or chair/Non-slip footwear when patient is out of bed/[place] to call system/Physically safe environment - no spills, clutter or unnecessary equipment/Purposeful Proactive Rounding/Room/bathroom lighting operational, light cord in reach

## 2022-08-03 NOTE — ASU DISCHARGE PLAN (ADULT/PEDIATRIC) - NS MD DC FALL RISK RISK
For information on Fall & Injury Prevention, visit: [email or web address] OR  [email or web address] OR  https://www.cdc.gov/steadi/patient.html

## 2022-08-03 NOTE — ASU PATIENT PROFILE, ADULT - NSICDXPASTMEDICALHX_GEN_ALL_CORE_FT
PAST MEDICAL HISTORY:  Ankle fracture, left     GERD (gastroesophageal reflux disease)     Hypertension

## 2022-08-03 NOTE — PRE PROCEDURE NOTE - PRE PROCEDURE EVALUATION
Attending Physician:              Tano Floyd MD MSEd    Indication for Procedure      GERD          PAST MEDICAL & SURGICAL HISTORY:  Hypertension      GERD (gastroesophageal reflux disease)      Ankle fracture, left      Ankle fracture, left  metal            See Allscripts Note for further details  ALLERGIES:  No Known Allergies    HOME MEDICATIONS:  hydroCHLOROthiazide 25 mg oral tablet: 1 tab(s) orally once a day  Nebivolol 10 mg oral tablet: 1 tab(s) orally once a day  omeprazole 40 mg oral delayed release capsule: 1 cap(s) orally once a day      See Allscripts Note for further details    AICD/PPM: [ ] yes   [x ] no    PERTINENT LAB DATA:                      PHYSICAL EXAMINATION:    Height (cm): 149.9  Weight (kg): 72.6  BMI (kg/m2): 32.3  BSA (m2): 1.68T(C): 36.4  HR: 59  BP: 148/76  RR: 18  SpO2: 98%    Constitutional: NAD  HEENT: PERRLA, EOMI,    Neck:  No JVD  Respiratory: CTAB/L  Cardiovascular: S1 and S2  Gastrointestinal: BS+, soft, NT/ND  Extremities: No peripheral edema  Neurological: A/O x 3, no focal deficits  Psychiatric: Normal mood, normal affect  Skin: No rashes    ASA Class: I [ ]  II [ ]  III [ x]  IV [ ]    COMMENTS:    The patient is a suitable candidate for the planned procedure unless box checked [ ]  No, explain:

## 2022-08-05 LAB — SURGICAL PATHOLOGY STUDY: SIGNIFICANT CHANGE UP

## 2022-08-05 PROCEDURE — 91035 G-ESOPH REFLX TST W/ELECTROD: CPT | Mod: 26

## 2022-08-16 ENCOUNTER — APPOINTMENT (OUTPATIENT)
Dept: SURGERY | Facility: CLINIC | Age: 87
End: 2022-08-16

## 2022-08-16 PROCEDURE — 99215 OFFICE O/P EST HI 40 MIN: CPT

## 2022-08-18 PROBLEM — S82.892A OTHER FRACTURE OF LEFT LOWER LEG, INITIAL ENCOUNTER FOR CLOSED FRACTURE: Chronic | Status: ACTIVE | Noted: 2022-08-03

## 2022-08-30 ENCOUNTER — APPOINTMENT (OUTPATIENT)
Dept: SURGERY | Facility: CLINIC | Age: 87
End: 2022-08-30

## 2022-08-30 VITALS
BODY MASS INDEX: 32.2 KG/M2 | HEIGHT: 60 IN | OXYGEN SATURATION: 98 % | DIASTOLIC BLOOD PRESSURE: 76 MMHG | WEIGHT: 164 LBS | SYSTOLIC BLOOD PRESSURE: 173 MMHG | HEART RATE: 59 BPM

## 2022-08-30 PROCEDURE — 99214 OFFICE O/P EST MOD 30 MIN: CPT

## 2022-09-01 ENCOUNTER — APPOINTMENT (OUTPATIENT)
Dept: ORTHOPEDIC SURGERY | Facility: CLINIC | Age: 87
End: 2022-09-01

## 2022-09-01 VITALS
DIASTOLIC BLOOD PRESSURE: 90 MMHG | SYSTOLIC BLOOD PRESSURE: 164 MMHG | HEIGHT: 60 IN | BODY MASS INDEX: 31.41 KG/M2 | WEIGHT: 160 LBS | HEART RATE: 64 BPM

## 2022-09-01 PROCEDURE — 99214 OFFICE O/P EST MOD 30 MIN: CPT

## 2022-09-01 NOTE — ASSESSMENT
[FreeTextEntry1] : I had a long discussion with the patient regards to her treatment plan and diagnosis.  At this point I would encourage her to continue with physical therapy new orders been placed today.  I will have her get a new epidural steroid injection as well.  She can follow-up with me in approximately 4 to 6 weeks.  All questions were answered

## 2022-09-01 NOTE — PHYSICAL EXAM
[de-identified] : Lumbar Physical Exam\par \par Gait -antalgic gait\par \par Station -forward pitched\par \par Sagittal balance -positive\par \par Compensatory mechanism? -Bent hips and bent knees\par \par \par \par Reflexes\par Patellar - normal\par Gastroc - normal\par Clonus - No\par \par Hip Exam - Normal\par \par Straight leg raise - none\par \par Pulses - 2+ dp/pt\par \par Range of motion - normal\par \par Sensation \par Sensation intact to light touch in L1, L2, L3, L4, L5 and S1 dermatomes bilaterally\par \par Motor\par 	IP	Quad	HS	TA	Gastroc	EHL\par Right	4/5	5/5	5/5	5/5	5/5	5/5\par Left	4/5	5/5	5/5	5/5	5/5	5/5 [de-identified] : Scoliosis radiographs\par Approximately 70 degrees of pelvic incidence\par 55 degrees of lumbar lordosis\par \par Lumbar radiographs\par Multiple areas of disc degeneration and facet arthropathy\par \par Lumbar MRI\par L5-S1  spondylolisthesis\par Moderate to severe spinal stenosis at L5-S1

## 2022-09-01 NOTE — HISTORY OF PRESENT ILLNESS
[de-identified] : Today the patient states that she is still dealing with some fairly significant back and left lower extremity symptoms.  She does have pain which radiates into her left medial thigh as well as her left lateral thigh, posterior lateral calf.  She does state that the symptoms can be disabling at times.  They are largely unchanged since her last visit.  Unfortunate her last epidural steroid injection did not substantially improve her symptoms.  She is here today to discuss next steps in treatment.\par \par 03/25/22\par Today the patient states that she has had a return of her lower extremity symptoms.  She does have pain which radiates down her posterior thigh bilaterally, left worse than right.  She recently had an injection in January which did help her symptoms significantly.  Unfortunately her symptoms have returned.  She denies any bowel bladder issues.  She denies any saddle anesthesia.\par \par 11/17/21\par Today the patient states that overall her symptoms have improved.  She still does have bilateral posterior thigh posterior calf pain.  Left side is worse than her right side.  She denies any bowel bladder issues.  She denies any saddle anesthesia.  She still has decreased walking tolerance.  She is here today to discuss next steps in care.\par \par 11/04/21\par This is an 86-year-old female that is here today for evaluation of her back and leg symptoms.  She complains of bilateral leg symptoms.  Left is worse than her right side.  She complains of left and right anterior thigh pain.  She also complains of left posterior lateral thigh posterior lateral calf pain.  She complains of intermittent cramping in her legs especially whenever she walks for any prolonged amount of time.  At this point she cannot even walk 1 block due to significant pain.  She has had these symptoms for years but over the past 1 year she has had acute worsening of her overall symptoms.  She denies any bowel bladder issues.  She denies any saddle anesthesia.

## 2022-09-07 ENCOUNTER — NON-APPOINTMENT (OUTPATIENT)
Age: 87
End: 2022-09-07

## 2022-09-07 ENCOUNTER — APPOINTMENT (OUTPATIENT)
Dept: CARDIOLOGY | Facility: CLINIC | Age: 87
End: 2022-09-07

## 2022-09-12 ENCOUNTER — APPOINTMENT (OUTPATIENT)
Dept: PULMONOLOGY | Facility: CLINIC | Age: 87
End: 2022-09-12

## 2022-09-23 ENCOUNTER — OUTPATIENT (OUTPATIENT)
Dept: OUTPATIENT SERVICES | Facility: HOSPITAL | Age: 87
LOS: 1 days | End: 2022-09-23
Payer: COMMERCIAL

## 2022-09-23 VITALS
HEIGHT: 55 IN | DIASTOLIC BLOOD PRESSURE: 62 MMHG | HEART RATE: 62 BPM | WEIGHT: 162.04 LBS | SYSTOLIC BLOOD PRESSURE: 146 MMHG | TEMPERATURE: 98 F | RESPIRATION RATE: 14 BRPM | OXYGEN SATURATION: 98 %

## 2022-09-23 DIAGNOSIS — Z01.818 ENCOUNTER FOR OTHER PREPROCEDURAL EXAMINATION: ICD-10-CM

## 2022-09-23 DIAGNOSIS — K21.9 GASTRO-ESOPHAGEAL REFLUX DISEASE WITHOUT ESOPHAGITIS: ICD-10-CM

## 2022-09-23 DIAGNOSIS — Z98.890 OTHER SPECIFIED POSTPROCEDURAL STATES: Chronic | ICD-10-CM

## 2022-09-23 DIAGNOSIS — S82.892A OTHER FRACTURE OF LEFT LOWER LEG, INITIAL ENCOUNTER FOR CLOSED FRACTURE: Chronic | ICD-10-CM

## 2022-09-23 DIAGNOSIS — Z90.89 ACQUIRED ABSENCE OF OTHER ORGANS: Chronic | ICD-10-CM

## 2022-09-23 LAB
ALBUMIN SERPL ELPH-MCNC: 4 G/DL — SIGNIFICANT CHANGE UP (ref 3.3–5)
ALP SERPL-CCNC: 105 U/L — SIGNIFICANT CHANGE UP (ref 40–120)
ALT FLD-CCNC: 17 U/L — SIGNIFICANT CHANGE UP (ref 12–78)
ANION GAP SERPL CALC-SCNC: 7 MMOL/L — SIGNIFICANT CHANGE UP (ref 5–17)
AST SERPL-CCNC: 12 U/L — LOW (ref 15–37)
BILIRUB SERPL-MCNC: 0.3 MG/DL — SIGNIFICANT CHANGE UP (ref 0.2–1.2)
BUN SERPL-MCNC: 15 MG/DL — SIGNIFICANT CHANGE UP (ref 7–23)
CALCIUM SERPL-MCNC: 9.4 MG/DL — SIGNIFICANT CHANGE UP (ref 8.5–10.1)
CHLORIDE SERPL-SCNC: 94 MMOL/L — LOW (ref 96–108)
CO2 SERPL-SCNC: 27 MMOL/L — SIGNIFICANT CHANGE UP (ref 22–31)
CREAT SERPL-MCNC: 0.59 MG/DL — SIGNIFICANT CHANGE UP (ref 0.5–1.3)
EGFR: 88 ML/MIN/1.73M2 — SIGNIFICANT CHANGE UP
GLUCOSE SERPL-MCNC: 106 MG/DL — HIGH (ref 70–99)
HCT VFR BLD CALC: 33.1 % — LOW (ref 34.5–45)
HGB BLD-MCNC: 11.2 G/DL — LOW (ref 11.5–15.5)
MCHC RBC-ENTMCNC: 28.3 PG — SIGNIFICANT CHANGE UP (ref 27–34)
MCHC RBC-ENTMCNC: 33.8 GM/DL — SIGNIFICANT CHANGE UP (ref 32–36)
MCV RBC AUTO: 83.6 FL — SIGNIFICANT CHANGE UP (ref 80–100)
NRBC # BLD: 0 /100 WBCS — SIGNIFICANT CHANGE UP (ref 0–0)
PLATELET # BLD AUTO: 236 K/UL — SIGNIFICANT CHANGE UP (ref 150–400)
POTASSIUM SERPL-MCNC: 4.7 MMOL/L — SIGNIFICANT CHANGE UP (ref 3.5–5.3)
POTASSIUM SERPL-SCNC: 4.7 MMOL/L — SIGNIFICANT CHANGE UP (ref 3.5–5.3)
PROT SERPL-MCNC: 7.3 G/DL — SIGNIFICANT CHANGE UP (ref 6–8.3)
RBC # BLD: 3.96 M/UL — SIGNIFICANT CHANGE UP (ref 3.8–5.2)
RBC # FLD: 13.6 % — SIGNIFICANT CHANGE UP (ref 10.3–14.5)
SODIUM SERPL-SCNC: 128 MMOL/L — LOW (ref 135–145)
WBC # BLD: 7.05 K/UL — SIGNIFICANT CHANGE UP (ref 3.8–10.5)
WBC # FLD AUTO: 7.05 K/UL — SIGNIFICANT CHANGE UP (ref 3.8–10.5)

## 2022-09-23 PROCEDURE — 86901 BLOOD TYPING SEROLOGIC RH(D): CPT

## 2022-09-23 PROCEDURE — 86900 BLOOD TYPING SEROLOGIC ABO: CPT

## 2022-09-23 PROCEDURE — 85027 COMPLETE CBC AUTOMATED: CPT

## 2022-09-23 PROCEDURE — 86850 RBC ANTIBODY SCREEN: CPT

## 2022-09-23 PROCEDURE — 80053 COMPREHEN METABOLIC PANEL: CPT

## 2022-09-23 PROCEDURE — G0463: CPT

## 2022-09-23 PROCEDURE — 36415 COLL VENOUS BLD VENIPUNCTURE: CPT

## 2022-09-23 NOTE — H&P PST ADULT - GENERAL COMMENTS
Denies any travel in the last  weeks - denies any recent exposure to anyone with known or suspected covid - denies any current covid symptoms

## 2022-09-23 NOTE — H&P PST ADULT - HISTORY OF PRESENT ILLNESS
87 year old Upper sorbian speaking female PMH Benign Essential Hypertension, Chronic Cough, Chronic GERD, Chronic Low Back pain (Sciatica affecting left side), Bilateral Dry Eyes, Dyspnea  on Exertion, Regurgitation of Food; Gastro-Esophageal Reflux Disease Without Esophagitis; presents today  with her daughter Anya for PST prior to Laparoscopic Hiatal hernia Repair with Fundoplication with Dr Arsne Mas on 10/3/2022.   Pt notes GERD causes her to have Chronic cough which has been going on for several years. Pt has lolis seen by both Pulmonary as well as Cardiology. Following full workup Hiatal Hernia was DX. Following consult, exam and discussions with Dr Mas regarding treatment options pt is electing for scheduled procedure.  87 year old Syriac speaking female PMH Benign Essential Hypertension, Chronic Cough, Chronic GERD, Chronic Low Back pain (Sciatica affecting left side), Bilateral Dry Eyes, Dyspnea  on Exertion, Regurgitation of Food; Gastro-Esophageal Reflux Disease Without Esophagitis; presents today  with her daughter Anya for PST prior to Laparoscopic Hiatal hernia Repair with Fundoplication with Dr Arsen Mas on 10/3/2022.   Daughter Anya interpreted for patient today in PST  Pt notes GERD causes her to have Chronic cough which has been going on for several years. Pt has lolis seen by both Pulmonary as well as Cardiology. Following full workup Hiatal Hernia was DX. Following consult, exam and discussions with Dr Mas regarding treatment options pt is electing for scheduled procedure.

## 2022-09-23 NOTE — H&P PST ADULT - NSICDXPASTSURGICALHX_GEN_ALL_CORE_FT
PAST SURGICAL HISTORY:  Ankle fracture, left 1980 (Hardware placed)    H/O colonoscopy     H/O endoscopy     History of tonsillectomy As a child

## 2022-09-23 NOTE — H&P PST ADULT - ASSESSMENT
87 year old Uzbek speaking female PMH Benign Essential Hypertension, Chronic Cough, Chronic GERD, Chronic Low Back pain (Sciatica affecting left side), Bilateral Dry Eyes, Dyspnea  on Exertion, Regurgitation of Food; Gastro-Esophageal Reflux Disease Without Esophagitis; scheduled for Laparoscopic Hiatal hernia Repair with Fundoplication with Dr Arsen Mas on 10/3/2022.

## 2022-09-23 NOTE — H&P PST ADULT - NEGATIVE ENMT SYMPTOMS
no hearing difficulty/no sinus symptoms/no nasal congestion/no post-nasal discharge/no abnormal taste sensation/no throat pain/no dysphagia

## 2022-09-23 NOTE — H&P PST ADULT - PROBLEM SELECTOR PLAN 1
PST Labs; CBC, CMP, Type & Screen (EKG on chart from 6/8/22). Medical clearance appointment scheduled with Dr Alvin Garcia on 9/29/22. Pt instructed to stop any NSAIDS/Herbal Supplements between now and procedure. May take Tylenol if needed for any pain between now and procedure. She was instructed to HOLD her HCTZ morning of procedure however she MAY take her Omeprazole and her Nebivolol with small sip of water. Pre-op instructions as well as pre-op wash instructions given to pt and daughter Anya with understanding verbalized. Appointment made for COVID swab at [facility] on 9/30/22 @ 0830. RX for COVID swab given to pt/daughter to  bring with them day of procedure. Both pt and daughter Anya verbalize understanding of all instructions discussed today in PST. All questions addressed with pt and daughter prior to them leaving the PST department today.

## 2022-09-23 NOTE — H&P PST ADULT - TEACHING/LEARNING LEARNING PREFERENCES
Written material given in Wolof to patient/individual instruction/verbal instruction/written material

## 2022-09-23 NOTE — H&P PST ADULT - NSICDXPASTMEDICALHX_GEN_ALL_CORE_FT
PAST MEDICAL HISTORY:  Ankle fracture, left     Back pain with sciatica Sciatica affects LEFT Side    Benign essential hypertension     Chronic cough     Chronic low back pain     Dyspnea on exertion     Gastro-esophageal reflux disease without esophagitis     GERD (gastroesophageal reflux disease)     Hypertension     Regurgitation of food

## 2022-09-29 ENCOUNTER — NON-APPOINTMENT (OUTPATIENT)
Age: 87
End: 2022-09-29

## 2022-09-29 ENCOUNTER — APPOINTMENT (OUTPATIENT)
Dept: INTERNAL MEDICINE | Facility: CLINIC | Age: 87
End: 2022-09-29

## 2022-09-29 VITALS
HEART RATE: 71 BPM | RESPIRATION RATE: 16 BRPM | WEIGHT: 160 LBS | TEMPERATURE: 98 F | DIASTOLIC BLOOD PRESSURE: 82 MMHG | SYSTOLIC BLOOD PRESSURE: 136 MMHG | BODY MASS INDEX: 31.41 KG/M2 | HEIGHT: 60 IN | OXYGEN SATURATION: 98 %

## 2022-09-29 DIAGNOSIS — K21.9 GASTRO-ESOPHAGEAL REFLUX DISEASE W/OUT ESOPHAGITIS: ICD-10-CM

## 2022-09-29 DIAGNOSIS — Z01.818 ENCOUNTER FOR OTHER PREPROCEDURAL EXAMINATION: ICD-10-CM

## 2022-09-29 PROBLEM — I10 ESSENTIAL (PRIMARY) HYPERTENSION: Chronic | Status: ACTIVE | Noted: 2022-09-23

## 2022-09-29 PROBLEM — M54.50 LOW BACK PAIN, UNSPECIFIED: Chronic | Status: ACTIVE | Noted: 2022-09-23

## 2022-09-29 PROBLEM — R11.10 VOMITING, UNSPECIFIED: Chronic | Status: ACTIVE | Noted: 2022-09-23

## 2022-09-29 PROBLEM — R05.3 CHRONIC COUGH: Chronic | Status: ACTIVE | Noted: 2022-09-23

## 2022-09-29 PROBLEM — M54.30 SCIATICA, UNSPECIFIED SIDE: Chronic | Status: ACTIVE | Noted: 2022-09-23

## 2022-09-29 PROBLEM — R06.09 OTHER FORMS OF DYSPNEA: Chronic | Status: ACTIVE | Noted: 2022-09-23

## 2022-09-29 PROCEDURE — 99214 OFFICE O/P EST MOD 30 MIN: CPT

## 2022-09-29 NOTE — PLAN
[FreeTextEntry1] : * repeat serum sodium and serum osmolality\par * stop HCTZ\par * instructed to decrease water intake\par * stable, clear for surgical procedure\par * follow up one month

## 2022-09-29 NOTE — ASSESSMENT
[Patient Optimized for Surgery] : Patient optimized for surgery [No Further Testing Recommended] : no further testing recommended [As per surgery] : as per surgery [FreeTextEntry4] : 87 years old female presents for medical consultation clearance for laparoscopic hiatal hernia repair surgery, medically stable , clear for surgical procedure

## 2022-09-29 NOTE — HISTORY OF PRESENT ILLNESS
[No Pertinent Cardiac History] : no history of aortic stenosis, atrial fibrillation, coronary artery disease, recent myocardial infarction, or implantable device/pacemaker [No Pertinent Pulmonary History] : no history of asthma, COPD, sleep apnea, or smoking [No Adverse Anesthesia Reaction] : no adverse anesthesia reaction in self or family member [FreeTextEntry1] : laparoscopic hiatal hernia repair with fundoplication [FreeTextEntry2] : 10/03/2022 [FreeTextEntry3] : DR TOMAS HDZ [FreeTextEntry4] : 87 years old female with HTN presents for medical consultation clearance for laparoscopic hiatal hernia repair requested BY DR TOMAS HDZ; patient refers feeling well, denies chest pain, no dyspnea.

## 2022-09-30 LAB
OSMOLALITY SERPL: 269 MOSMOL/KG
SODIUM SERPL-SCNC: 128 MMOL/L

## 2022-09-30 RX ORDER — SODIUM CHLORIDE 9 MG/ML
1000 INJECTION, SOLUTION INTRAVENOUS
Refills: 0 | Status: DISCONTINUED | OUTPATIENT
Start: 2022-10-03 | End: 2022-10-03

## 2022-09-30 NOTE — ASU PATIENT PROFILE, ADULT - FALL HARM RISK - UNIVERSAL INTERVENTIONS
Bed in lowest position, wheels locked, appropriate side rails in place/Call bell, personal items and telephone in reach/Instruct patient to call for assistance before getting out of bed or chair/Non-slip footwear when patient is out of bed/[place] to call system/Physically safe environment - no spills, clutter or unnecessary equipment/Purposeful Proactive Rounding/Room/bathroom lighting operational, light cord in reach

## 2022-09-30 NOTE — ASU PATIENT PROFILE, ADULT - TEACHING/LEARNING LEARNING PREFERENCES
Written material given in Belarusian to patient/individual instruction/verbal instruction/written material

## 2022-09-30 NOTE — ASU PATIENT PROFILE, ADULT - VISION (WITH CORRECTIVE LENSES IF THE PATIENT USUALLY WEARS THEM):
Please call and schedule BP follow up.   Normal vision: sees adequately in most situations; can see medication labels, newsprint

## 2022-10-02 ENCOUNTER — TRANSCRIPTION ENCOUNTER (OUTPATIENT)
Age: 87
End: 2022-10-02

## 2022-10-03 ENCOUNTER — APPOINTMENT (OUTPATIENT)
Dept: SURGERY | Facility: HOSPITAL | Age: 87
End: 2022-10-03

## 2022-10-03 ENCOUNTER — INPATIENT (INPATIENT)
Facility: HOSPITAL | Age: 87
LOS: 0 days | Discharge: HOME | DRG: 327 | End: 2022-10-04
Attending: SPECIALIST | Admitting: SPECIALIST
Payer: COMMERCIAL

## 2022-10-03 ENCOUNTER — RX RENEWAL (OUTPATIENT)
Age: 87
End: 2022-10-03

## 2022-10-03 VITALS
HEART RATE: 69 BPM | DIASTOLIC BLOOD PRESSURE: 58 MMHG | RESPIRATION RATE: 18 BRPM | SYSTOLIC BLOOD PRESSURE: 188 MMHG | OXYGEN SATURATION: 99 % | TEMPERATURE: 98 F

## 2022-10-03 DIAGNOSIS — Z98.890 OTHER SPECIFIED POSTPROCEDURAL STATES: Chronic | ICD-10-CM

## 2022-10-03 DIAGNOSIS — S82.892A OTHER FRACTURE OF LEFT LOWER LEG, INITIAL ENCOUNTER FOR CLOSED FRACTURE: Chronic | ICD-10-CM

## 2022-10-03 DIAGNOSIS — K21.9 GASTRO-ESOPHAGEAL REFLUX DISEASE WITHOUT ESOPHAGITIS: ICD-10-CM

## 2022-10-03 DIAGNOSIS — Z90.89 ACQUIRED ABSENCE OF OTHER ORGANS: Chronic | ICD-10-CM

## 2022-10-03 LAB
ANION GAP SERPL CALC-SCNC: 10 MMOL/L — SIGNIFICANT CHANGE UP (ref 5–17)
BUN SERPL-MCNC: 13 MG/DL — SIGNIFICANT CHANGE UP (ref 7–23)
CALCIUM SERPL-MCNC: 8.6 MG/DL — SIGNIFICANT CHANGE UP (ref 8.5–10.1)
CHLORIDE SERPL-SCNC: 97 MMOL/L — SIGNIFICANT CHANGE UP (ref 96–108)
CO2 SERPL-SCNC: 22 MMOL/L — SIGNIFICANT CHANGE UP (ref 22–31)
CREAT SERPL-MCNC: 0.89 MG/DL — SIGNIFICANT CHANGE UP (ref 0.5–1.3)
EGFR: 63 ML/MIN/1.73M2 — SIGNIFICANT CHANGE UP
GLUCOSE SERPL-MCNC: 287 MG/DL — HIGH (ref 70–99)
POTASSIUM SERPL-MCNC: 3.8 MMOL/L — SIGNIFICANT CHANGE UP (ref 3.5–5.3)
POTASSIUM SERPL-SCNC: 3.8 MMOL/L — SIGNIFICANT CHANGE UP (ref 3.5–5.3)
SODIUM SERPL-SCNC: 129 MMOL/L — LOW (ref 135–145)
SODIUM SERPL-SCNC: 132 MMOL/L — LOW (ref 135–145)

## 2022-10-03 PROCEDURE — 43282 LAP PARAESOPH HER RPR W/MESH: CPT | Mod: 80

## 2022-10-03 PROCEDURE — 43282 LAP PARAESOPH HER RPR W/MESH: CPT

## 2022-10-03 DEVICE — CLIP APPLIER COVIDIEN ENDOCLIP II 10MM MED/LG: Type: IMPLANTABLE DEVICE | Site: ABDOMINAL | Status: FUNCTIONAL

## 2022-10-03 DEVICE — MESH HERNIA PROCEED OVAL 4 X 6" TISSUE SEPARATING: Type: IMPLANTABLE DEVICE | Site: ABDOMINAL | Status: FUNCTIONAL

## 2022-10-03 RX ORDER — OXYCODONE HYDROCHLORIDE 5 MG/1
5 TABLET ORAL EVERY 6 HOURS
Refills: 0 | Status: DISCONTINUED | OUTPATIENT
Start: 2022-10-03 | End: 2022-10-04

## 2022-10-03 RX ORDER — SODIUM CHLORIDE 9 MG/ML
1000 INJECTION INTRAMUSCULAR; INTRAVENOUS; SUBCUTANEOUS
Refills: 0 | Status: DISCONTINUED | OUTPATIENT
Start: 2022-10-03 | End: 2022-10-04

## 2022-10-03 RX ORDER — CEFAZOLIN SODIUM 1 G
2000 VIAL (EA) INJECTION ONCE
Refills: 0 | Status: COMPLETED | OUTPATIENT
Start: 2022-10-03 | End: 2022-10-03

## 2022-10-03 RX ORDER — HYDROMORPHONE HYDROCHLORIDE 2 MG/ML
0.5 INJECTION INTRAMUSCULAR; INTRAVENOUS; SUBCUTANEOUS
Refills: 0 | Status: DISCONTINUED | OUTPATIENT
Start: 2022-10-03 | End: 2022-10-03

## 2022-10-03 RX ORDER — ENOXAPARIN SODIUM 100 MG/ML
40 INJECTION SUBCUTANEOUS EVERY 24 HOURS
Refills: 0 | Status: DISCONTINUED | OUTPATIENT
Start: 2022-10-03 | End: 2022-10-04

## 2022-10-03 RX ORDER — KETOROLAC TROMETHAMINE 30 MG/ML
30 SYRINGE (ML) INJECTION ONCE
Refills: 0 | Status: DISCONTINUED | OUTPATIENT
Start: 2022-10-03 | End: 2022-10-03

## 2022-10-03 RX ORDER — ONDANSETRON 8 MG/1
4 TABLET, FILM COATED ORAL ONCE
Refills: 0 | Status: DISCONTINUED | OUTPATIENT
Start: 2022-10-03 | End: 2022-10-03

## 2022-10-03 RX ORDER — ACETAMINOPHEN 500 MG
975 TABLET ORAL EVERY 6 HOURS
Refills: 0 | Status: DISCONTINUED | OUTPATIENT
Start: 2022-10-03 | End: 2022-10-04

## 2022-10-03 RX ORDER — ACETAMINOPHEN 500 MG
1000 TABLET ORAL ONCE
Refills: 0 | Status: DISCONTINUED | OUTPATIENT
Start: 2022-10-03 | End: 2022-10-03

## 2022-10-03 RX ORDER — METOPROLOL TARTRATE 50 MG
50 TABLET ORAL
Refills: 0 | Status: DISCONTINUED | OUTPATIENT
Start: 2022-10-03 | End: 2022-10-04

## 2022-10-03 RX ORDER — ONDANSETRON 8 MG/1
4 TABLET, FILM COATED ORAL EVERY 6 HOURS
Refills: 0 | Status: DISCONTINUED | OUTPATIENT
Start: 2022-10-03 | End: 2022-10-04

## 2022-10-03 RX ORDER — PANTOPRAZOLE SODIUM 20 MG/1
40 TABLET, DELAYED RELEASE ORAL DAILY
Refills: 0 | Status: DISCONTINUED | OUTPATIENT
Start: 2022-10-03 | End: 2022-10-04

## 2022-10-03 RX ORDER — SODIUM CHLORIDE 9 MG/ML
1000 INJECTION, SOLUTION INTRAVENOUS
Refills: 0 | Status: DISCONTINUED | OUTPATIENT
Start: 2022-10-03 | End: 2022-10-03

## 2022-10-03 RX ADMIN — Medication 975 MILLIGRAM(S): at 16:55

## 2022-10-03 RX ADMIN — SODIUM CHLORIDE 75 MILLILITER(S): 9 INJECTION, SOLUTION INTRAVENOUS at 08:59

## 2022-10-03 RX ADMIN — HYDROMORPHONE HYDROCHLORIDE 0.5 MILLIGRAM(S): 2 INJECTION INTRAMUSCULAR; INTRAVENOUS; SUBCUTANEOUS at 12:30

## 2022-10-03 RX ADMIN — Medication 50 MILLIGRAM(S): at 18:26

## 2022-10-03 RX ADMIN — SODIUM CHLORIDE 75 MILLILITER(S): 9 INJECTION INTRAMUSCULAR; INTRAVENOUS; SUBCUTANEOUS at 12:17

## 2022-10-03 RX ADMIN — HYDROMORPHONE HYDROCHLORIDE 0.5 MILLIGRAM(S): 2 INJECTION INTRAMUSCULAR; INTRAVENOUS; SUBCUTANEOUS at 12:50

## 2022-10-03 RX ADMIN — ONDANSETRON 4 MILLIGRAM(S): 8 TABLET, FILM COATED ORAL at 17:28

## 2022-10-03 RX ADMIN — SODIUM CHLORIDE 75 MILLILITER(S): 9 INJECTION INTRAMUSCULAR; INTRAVENOUS; SUBCUTANEOUS at 18:34

## 2022-10-03 RX ADMIN — PANTOPRAZOLE SODIUM 40 MILLIGRAM(S): 20 TABLET, DELAYED RELEASE ORAL at 17:36

## 2022-10-03 RX ADMIN — Medication 975 MILLIGRAM(S): at 23:30

## 2022-10-03 RX ADMIN — Medication 975 MILLIGRAM(S): at 17:50

## 2022-10-03 RX ADMIN — ONDANSETRON 4 MILLIGRAM(S): 8 TABLET, FILM COATED ORAL at 23:31

## 2022-10-03 RX ADMIN — Medication 30 MILLIGRAM(S): at 13:25

## 2022-10-03 RX ADMIN — HYDROMORPHONE HYDROCHLORIDE 0.5 MILLIGRAM(S): 2 INJECTION INTRAMUSCULAR; INTRAVENOUS; SUBCUTANEOUS at 12:37

## 2022-10-03 RX ADMIN — HYDROMORPHONE HYDROCHLORIDE 0.5 MILLIGRAM(S): 2 INJECTION INTRAMUSCULAR; INTRAVENOUS; SUBCUTANEOUS at 12:14

## 2022-10-03 RX ADMIN — Medication 30 MILLIGRAM(S): at 13:12

## 2022-10-03 RX ADMIN — SODIUM CHLORIDE 75 MILLILITER(S): 9 INJECTION, SOLUTION INTRAVENOUS at 14:34

## 2022-10-03 NOTE — PROVIDER CONTACT NOTE (OTHER) - ASSESSMENT
Patient awake with O2 in use. Right shoulder pain continues despite of pain meds given in PACU. Repositioned pt with no relief.  Warm compress applied over the area and enouraged use of incentive spirometer.  Daughter at bedside

## 2022-10-03 NOTE — BRIEF OPERATIVE NOTE - NSICDXBRIEFPREOP_GEN_ALL_CORE_FT
Traumatic acute SDH, midline shift, brain compression s/p craniotomy and evacuation POD#4   7/30 Extubated     NEURO:  Q1 neuro checks   Keppra for seizure ppx  Amantadine 100 mg po bid  consider EEG  Activity: [] OOB as tolerated [] Bedrest [x] PT [x] OT [] PMNR    PULM:  Extubated 7/30     CV:  -160mmHg  HTN: Norvasc, enalapril    RENAL:  IVL     GI:  Diet: restart glucerna after OR   GI prophylaxis [] not indicated [x] PPI [] other:  Bowel regimen [] colace [x] senna [x] other: miralax    ENDO:   Goal euglycemia (-180)  HBA1c 7.2   NPH 15Q6, HISS, monitor FS      HEME/ONC:  Hold VTE ppx as per NS team  VTE prophylaxis: [x] SCDs [] chemoprophylaxis [x] hold chemoprophylaxis due to: fresh post op [] high risk of DVT/PE on admission due to:    ID:  Afebrile    SOCIAL/FAMILY:  [x] awaiting [] updated at bedside [] family meeting    CODE STATUS:  [x] Full Code [] DNR [] DNI [] Palliative/Comfort Care    DISPOSITION:  [x] ICU [] Stroke Unit [] Floor [] EMU [] RCU [] PCU    [x] Patient is at high risk of neurologic deterioration/death due to: post op hemorrhage, brain compression Traumatic acute SDH, midline shift, brain compression s/p craniotomy and evacuation POD#4   7/30 Extubated     NEURO:  Q1 neuro checks   Keppra for seizure ppx increased 7/21 for possible seizures   Amantadine 100 mg po bid  EEG  CT in AM  Activity: [] OOB as tolerated [] Bedrest [x] PT [x] OT [] PMNR    PULM:  Extubated 7/30   reextubated in OR 7/31    CV:  -160mmHg  HTN: Norvasc, enalapril    RENAL:  IVL     GI:  Diet: restart glucerna after OR   GI prophylaxis [] not indicated [x] PPI [] other:  Bowel regimen [] colace [x] senna [x] other: miralax    ENDO:   Goal euglycemia (-180)  HBA1c 7.2   NPH 15Q6, HISS, monitor FS      HEME/ONC:  Hold VTE ppx as per NS team  VTE prophylaxis: [x] SCDs [] chemoprophylaxis [x] hold chemoprophylaxis due to: fresh post op [] high risk of DVT/PE on admission due to:    ID:  Afebrile    SOCIAL/FAMILY:  [x] awaiting [] updated at bedside [] family meeting    CODE STATUS:  [x] Full Code [] DNR [] DNI [] Palliative/Comfort Care    DISPOSITION:  [x] ICU [] Stroke Unit [] Floor [] EMU [] RCU [] PCU    [x] Patient is at high risk of neurologic deterioration/death due to: post op hemorrhage, brain compression PRE-OP DIAGNOSIS:  Hiatal hernia 03-Oct-2022 12:01:17  Cirilo Britt

## 2022-10-03 NOTE — PROVIDER CONTACT NOTE (OTHER) - DATE AND TIME:
Lab order for protime STAT placed for Monday.     Call your physician immediately if you notice any of the following symptoms of a blood clot:   Sudden weakness in any limb  Numbness or tingling anywhere  Visual changes or loss of sight in either eye  Sudden onset of slurred speech or inability to speak  Dizziness or faintness  New pain, swelling, redness or heat in any extremity  New SOB or chest pain  Symptoms associated with blood clotting/low INR reviewed and Patient verbalizes understanding: Yes:     Patient relayed information below.   
03-Oct-2022 12:47
03-Oct-2022 14:30

## 2022-10-03 NOTE — PATIENT PROFILE ADULT - FALL HARM RISK - UNIVERSAL INTERVENTIONS
Bed in lowest position, wheels locked, appropriate side rails in place/Call bell, personal items and telephone in reach/Instruct patient to call for assistance before getting out of bed or chair/Non-slip footwear when patient is out of bed/[place] to call system/Physically safe environment - no spills, clutter or unnecessary equipment/Purposeful Proactive Rounding/Room/bathroom lighting operational, light cord in reach

## 2022-10-03 NOTE — PROGRESS NOTE ADULT - SUBJECTIVE AND OBJECTIVE BOX
POST-OPERATIVE NOTE    Subjective:  Patient is s/p Laparoscopic Hernia Repair w/ mesh and Toupet Fundoplication. Patient reports mild shoulder discomfort but denies pain symptoms anywhere else. Recovering appropriately. Patient states that she is hungry although she hasn't had anything to eat as of now.     Vital Signs Last 24 Hrs  T(C): 36.4 (03 Oct 2022 14:09), Max: 36.6 (03 Oct 2022 13:40)  T(F): 97.6 (03 Oct 2022 14:09), Max: 97.9 (03 Oct 2022 13:40)  HR: 79 (03 Oct 2022 14:09) (69 - 83)  BP: 154/70 (03 Oct 2022 14:09) (117/60 - 188/58)  BP(mean): --  RR: 20 (03 Oct 2022 14:09) (12 - 20)  SpO2: 97% (03 Oct 2022 14:12) (90% - 100%)    Parameters below as of 03 Oct 2022 14:12  Patient On (Oxygen Delivery Method): nasal cannula  O2 Flow (L/min): 2    I&O's Detail    03 Oct 2022 07:01  -  03 Oct 2022 16:30  --------------------------------------------------------  IN:    sodium chloride 0.9%: 75 mL  Total IN: 75 mL    OUT:  Total OUT: 0 mL    Total NET: 75 mL        enoxaparin Injectable 40    PAST MEDICAL & SURGICAL HISTORY:  Hypertension      GERD (gastroesophageal reflux disease)      Ankle fracture, left      Gastro-esophageal reflux disease without esophagitis      Benign essential hypertension      Chronic cough      Chronic low back pain      Dyspnea on exertion      Back pain with sciatica  Sciatica affects LEFT Side      Regurgitation of food      Ankle fracture, left  1980 (Hardware placed)      H/O endoscopy      H/O colonoscopy      History of tonsillectomy  As a child            Physical Exam:  General: NAD, resting comfortably in bed  Pulmonary: Nonlabored breathing, no respiratory distress.  Cardiovascular: Regular Rate and Rhythm   Abdominal: soft, NT/ND. 4 Laparoscopic incisions  that are well approximated with dermabond attached.  No rebound or Guarding.   Extremities: WWP      LABS:    10-03    132<L>  |  x   |  x   ----------------------------<  x   x    |  x   |  x           CAPILLARY BLOOD GLUCOSE          Radiology and Additional Studies:  None this admission

## 2022-10-04 ENCOUNTER — TRANSCRIPTION ENCOUNTER (OUTPATIENT)
Age: 87
End: 2022-10-04

## 2022-10-04 VITALS
DIASTOLIC BLOOD PRESSURE: 69 MMHG | OXYGEN SATURATION: 96 % | RESPIRATION RATE: 18 BRPM | TEMPERATURE: 98 F | HEART RATE: 66 BPM | SYSTOLIC BLOOD PRESSURE: 159 MMHG

## 2022-10-04 LAB
ANION GAP SERPL CALC-SCNC: 7 MMOL/L — SIGNIFICANT CHANGE UP (ref 5–17)
BASOPHILS # BLD AUTO: 0.02 K/UL — SIGNIFICANT CHANGE UP (ref 0–0.2)
BASOPHILS NFR BLD AUTO: 0.2 % — SIGNIFICANT CHANGE UP (ref 0–2)
BUN SERPL-MCNC: 13 MG/DL — SIGNIFICANT CHANGE UP (ref 7–23)
CALCIUM SERPL-MCNC: 8.4 MG/DL — LOW (ref 8.5–10.1)
CHLORIDE SERPL-SCNC: 100 MMOL/L — SIGNIFICANT CHANGE UP (ref 96–108)
CO2 SERPL-SCNC: 25 MMOL/L — SIGNIFICANT CHANGE UP (ref 22–31)
CREAT SERPL-MCNC: 0.67 MG/DL — SIGNIFICANT CHANGE UP (ref 0.5–1.3)
EGFR: 85 ML/MIN/1.73M2 — SIGNIFICANT CHANGE UP
EOSINOPHIL # BLD AUTO: 0 K/UL — SIGNIFICANT CHANGE UP (ref 0–0.5)
EOSINOPHIL NFR BLD AUTO: 0 % — SIGNIFICANT CHANGE UP (ref 0–6)
GLUCOSE SERPL-MCNC: 103 MG/DL — HIGH (ref 70–99)
HCT VFR BLD CALC: 30.9 % — LOW (ref 34.5–45)
HGB BLD-MCNC: 10.1 G/DL — LOW (ref 11.5–15.5)
IMM GRANULOCYTES NFR BLD AUTO: 0.6 % — SIGNIFICANT CHANGE UP (ref 0–0.9)
LYMPHOCYTES # BLD AUTO: 1.97 K/UL — SIGNIFICANT CHANGE UP (ref 1–3.3)
LYMPHOCYTES # BLD AUTO: 18.2 % — SIGNIFICANT CHANGE UP (ref 13–44)
MAGNESIUM SERPL-MCNC: 2.2 MG/DL — SIGNIFICANT CHANGE UP (ref 1.6–2.6)
MCHC RBC-ENTMCNC: 27.8 PG — SIGNIFICANT CHANGE UP (ref 27–34)
MCHC RBC-ENTMCNC: 32.7 GM/DL — SIGNIFICANT CHANGE UP (ref 32–36)
MCV RBC AUTO: 85.1 FL — SIGNIFICANT CHANGE UP (ref 80–100)
MONOCYTES # BLD AUTO: 1.05 K/UL — HIGH (ref 0–0.9)
MONOCYTES NFR BLD AUTO: 9.7 % — SIGNIFICANT CHANGE UP (ref 2–14)
NEUTROPHILS # BLD AUTO: 7.72 K/UL — HIGH (ref 1.8–7.4)
NEUTROPHILS NFR BLD AUTO: 71.3 % — SIGNIFICANT CHANGE UP (ref 43–77)
NRBC # BLD: 0 /100 WBCS — SIGNIFICANT CHANGE UP (ref 0–0)
PHOSPHATE SERPL-MCNC: 4.1 MG/DL — SIGNIFICANT CHANGE UP (ref 2.5–4.5)
PLATELET # BLD AUTO: 233 K/UL — SIGNIFICANT CHANGE UP (ref 150–400)
POTASSIUM SERPL-MCNC: 4.5 MMOL/L — SIGNIFICANT CHANGE UP (ref 3.5–5.3)
POTASSIUM SERPL-SCNC: 4.5 MMOL/L — SIGNIFICANT CHANGE UP (ref 3.5–5.3)
RBC # BLD: 3.63 M/UL — LOW (ref 3.8–5.2)
RBC # FLD: 14 % — SIGNIFICANT CHANGE UP (ref 10.3–14.5)
SODIUM SERPL-SCNC: 132 MMOL/L — LOW (ref 135–145)
WBC # BLD: 10.83 K/UL — HIGH (ref 3.8–10.5)
WBC # FLD AUTO: 10.83 K/UL — HIGH (ref 3.8–10.5)

## 2022-10-04 PROCEDURE — 93010 ELECTROCARDIOGRAM REPORT: CPT

## 2022-10-04 RX ORDER — ONDANSETRON 8 MG/1
4 TABLET, FILM COATED ORAL EVERY 6 HOURS
Refills: 0 | Status: DISCONTINUED | OUTPATIENT
Start: 2022-10-04 | End: 2022-10-04

## 2022-10-04 RX ORDER — SODIUM CHLORIDE 9 MG/ML
1 INJECTION INTRAMUSCULAR; INTRAVENOUS; SUBCUTANEOUS
Qty: 14 | Refills: 0
Start: 2022-10-04

## 2022-10-04 RX ORDER — SODIUM CHLORIDE 9 MG/ML
1 INJECTION INTRAMUSCULAR; INTRAVENOUS; SUBCUTANEOUS
Qty: 2 | Refills: 0
Start: 2022-10-04 | End: 2022-10-05

## 2022-10-04 RX ORDER — ONDANSETRON 8 MG/1
1 TABLET, FILM COATED ORAL
Qty: 14 | Refills: 0
Start: 2022-10-04

## 2022-10-04 RX ORDER — SODIUM CHLORIDE 9 MG/ML
2 INJECTION INTRAMUSCULAR; INTRAVENOUS; SUBCUTANEOUS ONCE
Refills: 0 | Status: COMPLETED | OUTPATIENT
Start: 2022-10-04 | End: 2022-10-04

## 2022-10-04 RX ORDER — ONDANSETRON 8 MG/1
1 TABLET, FILM COATED ORAL
Qty: 20 | Refills: 0
Start: 2022-10-04 | End: 2022-10-06

## 2022-10-04 RX ADMIN — Medication 975 MILLIGRAM(S): at 05:24

## 2022-10-04 RX ADMIN — Medication 50 MILLIGRAM(S): at 05:24

## 2022-10-04 RX ADMIN — Medication 975 MILLIGRAM(S): at 13:10

## 2022-10-04 RX ADMIN — SODIUM CHLORIDE 75 MILLILITER(S): 9 INJECTION INTRAMUSCULAR; INTRAVENOUS; SUBCUTANEOUS at 06:50

## 2022-10-04 RX ADMIN — Medication 975 MILLIGRAM(S): at 05:54

## 2022-10-04 RX ADMIN — ONDANSETRON 4 MILLIGRAM(S): 8 TABLET, FILM COATED ORAL at 05:24

## 2022-10-04 RX ADMIN — Medication 975 MILLIGRAM(S): at 00:00

## 2022-10-04 RX ADMIN — Medication 975 MILLIGRAM(S): at 12:19

## 2022-10-04 RX ADMIN — ONDANSETRON 4 MILLIGRAM(S): 8 TABLET, FILM COATED ORAL at 11:23

## 2022-10-04 RX ADMIN — SODIUM CHLORIDE 2 GRAM(S): 9 INJECTION INTRAMUSCULAR; INTRAVENOUS; SUBCUTANEOUS at 12:41

## 2022-10-04 RX ADMIN — ENOXAPARIN SODIUM 40 MILLIGRAM(S): 100 INJECTION SUBCUTANEOUS at 09:31

## 2022-10-04 RX ADMIN — PANTOPRAZOLE SODIUM 40 MILLIGRAM(S): 20 TABLET, DELAYED RELEASE ORAL at 11:23

## 2022-10-04 NOTE — PROGRESS NOTE ADULT - PROBLEM SELECTOR PROBLEM 1
Gastro-esophageal reflux disease without esophagitis
Gastro-esophageal reflux disease without esophagitis

## 2022-10-04 NOTE — PROGRESS NOTE ADULT - SUBJECTIVE AND OBJECTIVE BOX
SURGERY Progress NOTE ON BEHALF OF      S: Patient seen and examined at bedside.   Patient states that she still has bilateral shoulder pain that is improving with heating and cooling packs.  She  is tolerating clear liquids and denies nausea or vomiting.     MEDICATIONS:  acetaminophen    Suspension .. 975 milliGRAM(s) Oral every 6 hours  enoxaparin Injectable 40 milliGRAM(s) SubCutaneous every 24 hours  metoprolol tartrate 50 milliGRAM(s) Oral two times a day  ondansetron Injectable 4 milliGRAM(s) IV Push every 6 hours PRN  ondansetron Injectable 4 milliGRAM(s) IV Push every 6 hours  oxyCODONE    IR 5 milliGRAM(s) Oral every 6 hours PRN  pantoprazole  Injectable 40 milliGRAM(s) IV Push daily  sodium chloride 0.9%. 1000 milliLiter(s) IV Continuous <Continuous>      O:  Vital Signs Last 24 Hrs  T(C): 36.8 (04 Oct 2022 04:19), Max: 36.8 (03 Oct 2022 23:55)  T(F): 98.3 (04 Oct 2022 04:19), Max: 98.3 (03 Oct 2022 23:55)  HR: 68 (04 Oct 2022 04:19) (64 - 83)  BP: 133/69 (04 Oct 2022 04:19) (117/60 - 154/70)  BP(mean): --  RR: 18 (04 Oct 2022 04:19) (12 - 20)  SpO2: 94% (04 Oct 2022 05:29) (90% - 100%)    Parameters below as of 04 Oct 2022 05:29  Patient On (Oxygen Delivery Method): room air        I&O SUMMARY:    10-03-22 @ 07:01  -  10-04-22 @ 07:00  --------------------------------------------------------  IN: 1600 mL / OUT: 700 mL / NET: 900 mL        PHYSICAL EXAM:  General: NAD, resting comfortably in bed  Pulmonary: Nonlabored breathing, no respiratory distress.  Cardiovascular: Regular Rate and Rhythm   Abdominal: soft, NT/ND. 4 Laparoscopic incisions that are well approximated with dermabond attached without drainage or surrounding erythema.  No rebound or Guarding.   Extremities: WWP    LABS:                        10.1   10.83 )-----------( 233      ( 04 Oct 2022 07:15 )             30.9     10-04    132<L>  |  100  |  13  ----------------------------<  103<H>  4.5   |  25  |  0.67    Ca    8.4<L>      04 Oct 2022 07:15  Mg     2.2     10-04                RADIOLOGY:  None this Admission         SURGERY Progress NOTE ON BEHALF OF      S: Patient seen and examined at bedside.   Patient states that she still has bilateral shoulder pain that is improving with heating and cooling packs.  She  is tolerating clear liquids and denies vomiting. Patient reports bowel function with passage of flatus.     MEDICATIONS:  acetaminophen    Suspension .. 975 milliGRAM(s) Oral every 6 hours  enoxaparin Injectable 40 milliGRAM(s) SubCutaneous every 24 hours  metoprolol tartrate 50 milliGRAM(s) Oral two times a day  ondansetron Injectable 4 milliGRAM(s) IV Push every 6 hours PRN  ondansetron Injectable 4 milliGRAM(s) IV Push every 6 hours  oxyCODONE    IR 5 milliGRAM(s) Oral every 6 hours PRN  pantoprazole  Injectable 40 milliGRAM(s) IV Push daily  sodium chloride 0.9%. 1000 milliLiter(s) IV Continuous <Continuous>      O:  Vital Signs Last 24 Hrs  T(C): 36.8 (04 Oct 2022 04:19), Max: 36.8 (03 Oct 2022 23:55)  T(F): 98.3 (04 Oct 2022 04:19), Max: 98.3 (03 Oct 2022 23:55)  HR: 68 (04 Oct 2022 04:19) (64 - 83)  BP: 133/69 (04 Oct 2022 04:19) (117/60 - 154/70)  BP(mean): --  RR: 18 (04 Oct 2022 04:19) (12 - 20)  SpO2: 94% (04 Oct 2022 05:29) (90% - 100%)    Parameters below as of 04 Oct 2022 05:29  Patient On (Oxygen Delivery Method): room air        I&O SUMMARY:    10-03-22 @ 07:01  -  10-04-22 @ 07:00  --------------------------------------------------------  IN: 1600 mL / OUT: 700 mL / NET: 900 mL        PHYSICAL EXAM:  General: NAD, resting comfortably in bed  Pulmonary: Nonlabored breathing, no respiratory distress.  Cardiovascular: Regular Rate and Rhythm   Abdominal: soft, NT/ND. 4 Laparoscopic incisions that are well approximated with dermabond attached without drainage or surrounding erythema.  No rebound or Guarding.   Extremities: [facility]    LABS:                        10.1   10.83 )-----------( 233      ( 04 Oct 2022 07:15 )             30.9     10-04    132<L>  |  100  |  13  ----------------------------<  103<H>  4.5   |  25  |  0.67    Ca    8.4<L>      04 Oct 2022 07:15  Mg     2.2     10-04                RADIOLOGY:  None this Admission

## 2022-10-04 NOTE — DISCHARGE NOTE NURSING/CASE MANAGEMENT/SOCIAL WORK - NSDCPEFALRISK_GEN_ALL_CORE
For information on Fall & Injury Prevention, visit: [email or web address] OR  [email or web address] OR  https://www.cdc.gov/steadi/patient.html

## 2022-10-04 NOTE — DISCHARGE NOTE PROVIDER - NSDCMRMEDTOKEN_GEN_ALL_CORE_FT
Nebivolol 10 mg oral tablet: 1 tab(s) orally once a day- IN AM  omeprazole 40 mg oral delayed release capsule: 1 cap(s) orally once a day- IN AM  Vitamin C 1000 mg oral tablet: 1 tab(s) orally 3 times a week- IN AM   Nebivolol 10 mg oral tablet: 1 tab(s) orally once a day- IN AM  omeprazole 40 mg oral delayed release capsule: 1 cap(s) orally once a day- IN AM  ondansetron 4 mg oral tablet, disintegratin tab(s) orally every 6 hours, As Needed -for nausea MDD:1 tab   sodium chloride 1000 mg oral tablet, soluble: 1 g/kg orally once a day MDD:1  Vitamin C 1000 mg oral tablet: 1 tab(s) orally 3 times a week- IN AM

## 2022-10-04 NOTE — DISCHARGE NOTE PROVIDER - NSDCFUADDINST_GEN_ALL_CORE_FT
Can PO medications with Diet. Can crush Salt tabs into Smoothies, Beverages,  and other items in Full Liquid Diet.

## 2022-10-04 NOTE — DISCHARGE NOTE PROVIDER - NSDCFUADDAPPT_GEN_ALL_CORE_FT
Follow Up with your PCP within 1-2 weeks regard HCTZ and Chronic Hyponatremia.  Follow Up with your PCP within 1-2 weeks to check you serum sodium level. You are being sent home on 2 weeks worth of salt tablets to help increase your sodium level. You have been provided with Dr. Brandt's office information to follow up with regarding your sodium level incase you are unable to see your primary care provider.

## 2022-10-04 NOTE — DISCHARGE NOTE PROVIDER - PROVIDER TOKENS
PROVIDER:[TOKEN:[[id number]],FOLLOWUP:[1 week]] PROVIDER:[TOKEN:[[id number]],FOLLOWUP:[1 week]],PROVIDER:[TOKEN:[[id number]],FOLLOWUP:[2 weeks]]

## 2022-10-04 NOTE — DISCHARGE NOTE NURSING/CASE MANAGEMENT/SOCIAL WORK - DATE OF FIRST COVID-19 BOOSTER
17-Aug-2021 Detail Level: Zone Samples Given: Cloderm cream bid to rash Discontinue Regimen: Epiduo forte Initiate Treatment: Amzeeq 4 % topical foam Qam\\nDays Supply: 30\\nSig: Apply to entire face qam\\n\\nspironolactone 50 mg tablet Bid\\nDays Supply: 90\\nSig: Take one tablet bid Continue Regimen: Soolantra apply topically to entire face qhs

## 2022-10-04 NOTE — DISCHARGE NOTE PROVIDER - CARE PROVIDER_API CALL
Arsen Mas)  Surgery  [address] [place] [address]  Phone: [phone]  Fax: [phone]  Follow Up Time: 1 week   Arsen Mas)  Surgery  [address] [place] [address]  Phone: [phone]  Fax: [phone]  Follow Up Time: 1 week    Mikhail Brandt  [facility]  [address], [address] [place] [address]  Phone: [phone]  Fax: [phone]  Follow Up Time: 2 weeks

## 2022-10-04 NOTE — PROGRESS NOTE ADULT - ASSESSMENT
Patient is a w/ PMH of HTN, GERD, Chronic Low Back pain who is POD1 status post Laparoscopic Hiatal Hernia Repair with mesh and Toupet fundoplication. Patient is doing well and denies significant pain. Patient ensdorses Bilateral shoulder pain that improved compared to previous day. Presented with Hyponatremia to 128>132>129 on morning labs 10/4.  Patient is otherwise stable and without acute findings or symptoms. 
Patient is a w/ PMH of HTN, GERD, Chronic Low Back pain who is POD0 who is 4 hours status post Laparoscopic Hiatal Hernia Repair with mesh and Toupet fundoplication. Patient is doing well and denies significant pain. Patient only reports right shoulder pain that is controlled with alternating heat and ice packs. Presented with Hyponatremia to 128 and 132 on repeat labs, Evening BMP ordered.

## 2022-10-04 NOTE — DISCHARGE NOTE NURSING/CASE MANAGEMENT/SOCIAL WORK - PATIENT PORTAL LINK FT
You can access the FollowMyHealth Patient Portal offered by [facility] by registering at the following website: [email or web address]. By joining [employer]’s FollowMyHealth portal, you will also be able to view your health information using other applications (apps) compatible with our system.

## 2022-10-04 NOTE — PROGRESS NOTE ADULT - PROBLEM SELECTOR PLAN 1
Plan:  - Pain control as needed  - DVT ppx w/ Lovenox and SCDs  - OOB and ambulating as tolerated  - F/u AM labs  - Follow Up PM BMP @ 6pm  - C/w NS@ 75 ml/hr
- Pain control as needed  - Advance to Full Liquid Diet and Follow Up tolerance  - DVT ppx w/ Lovenox and SCDs  - OOB and ambulating as tolerated  - F/u AM labs  - Follow Up Nephrology consult

## 2022-10-04 NOTE — DISCHARGE NOTE NURSING/CASE MANAGEMENT/SOCIAL WORK - NSDCPNINST_GEN_ALL_CORE
Eat sitting up and  take it slow . Avoid carbonated drinks like soda and seltzer. No heavy lifting. Worsening abdominal pain, nausea and vomiting to call your doctor.   Chest pain and shortness of breath call 911. Walk as much as you can tolerate

## 2022-10-04 NOTE — CONSULT NOTE ADULT - ASSESSMENT
Hyponatremia: HCTZ Rx  Hiatal hernia, s/p Laparoscopic repair of hiatal hernia using mesh 03-Oct-2022   Hypertension    D/c HCTZ. Avoid use as out pt. Nacl tabs as tolerated. Surgery follow up.   Monitor BP trend. Titrate BP meds as needed. Renal indices stable. Will follow electrolytes and renal function trend.   Further recommendations pending clinical course. Thank you for the courtesy of this referral.

## 2022-10-04 NOTE — DISCHARGE NOTE PROVIDER - CARE PROVIDERS DIRECT ADDRESSES
,[email or web address]

## 2022-10-04 NOTE — DISCHARGE NOTE PROVIDER - HOSPITAL COURSE
Patient is an 87 year old female with past medical history of Benign Hypertension, chronic cough, chronic GERD without esophagitis , Chronic Low Back pain who presented  for Laparoscopic Hiatal Hernia repair with Dr. Mas.    Hospital course:  87y Female with PMH w/ PMH of Benign Hypertension, Chronic Cough, GERD without esophagitis, and chronic low Back pain who presented for Laparoscopic Hiatal Hernia w/ Mesh and Toupet Fundoplication. Patient completed Pre-operative testing resulting  a diagnosis of Hiatal Hernia and clearance prior to her procedure.     During this hospital course, patient had was discharged to the floors with monitoring following OR.  Patient presented with Hyponatremia  to 132 mEq/L on admission with repeat at 129 mEq/L  six hours post surgery. Patient complained of Right sided shoulder pain which improved with heat and ice pack administration.  Patient tolerated diet advancement to clear liquid diet. Patient  complained of chest discomfort with deep breathing  but denied  Nausea, vomiting, diaphoresis, or Dyspnea. Patient was evaluated with an EKG with unremarkable findings.    On POD1, Patient complained of Nausea and a globus sensation  but denied vomiting fever, or abdominal pain. Patient also endorsed return of bowel function with passage of flatus and stated she had been ambulating.     Patient had the following workup done in house:  EKG: Normal Sinus Rhythm without Arrhythmia, without ST segment changes or T wave findings.       Consultants:   -Nephrology: DC HCTZ and avoid use outpatient,  Salt Tabs as Needed      New medications on discharge: Odansetron  4 mg Disintegrating Strips   Labs to be followed up: None   Imaging to be done as outpatient: None

## 2022-10-04 NOTE — DISCHARGE NOTE PROVIDER - NSDCCPCAREPLAN_GEN_ALL_CORE_FT
PRINCIPAL DISCHARGE DIAGNOSIS  Diagnosis: Gastro-esophageal reflux disease without esophagitis  Assessment and Plan of Treatment:

## 2022-10-06 ENCOUNTER — NON-APPOINTMENT (OUTPATIENT)
Age: 87
End: 2022-10-06

## 2022-10-10 ENCOUNTER — RX RENEWAL (OUTPATIENT)
Age: 87
End: 2022-10-10

## 2022-10-13 ENCOUNTER — APPOINTMENT (OUTPATIENT)
Dept: SURGERY | Facility: CLINIC | Age: 87
End: 2022-10-13

## 2022-10-13 VITALS
HEIGHT: 60 IN | WEIGHT: 160 LBS | SYSTOLIC BLOOD PRESSURE: 125 MMHG | DIASTOLIC BLOOD PRESSURE: 77 MMHG | OXYGEN SATURATION: 98 % | HEART RATE: 79 BPM | BODY MASS INDEX: 31.41 KG/M2

## 2022-10-13 PROCEDURE — 99024 POSTOP FOLLOW-UP VISIT: CPT

## 2022-10-20 PROCEDURE — C1781: CPT

## 2022-10-20 PROCEDURE — C1889: CPT

## 2022-10-20 PROCEDURE — 84295 ASSAY OF SERUM SODIUM: CPT

## 2022-10-20 PROCEDURE — 83735 ASSAY OF MAGNESIUM: CPT

## 2022-10-20 PROCEDURE — 93005 ELECTROCARDIOGRAM TRACING: CPT

## 2022-10-20 PROCEDURE — 36415 COLL VENOUS BLD VENIPUNCTURE: CPT

## 2022-10-20 PROCEDURE — C9399: CPT

## 2022-10-20 PROCEDURE — 84100 ASSAY OF PHOSPHORUS: CPT

## 2022-10-20 PROCEDURE — 85025 COMPLETE CBC W/AUTO DIFF WBC: CPT

## 2022-10-20 PROCEDURE — 80048 BASIC METABOLIC PNL TOTAL CA: CPT

## 2022-11-02 ENCOUNTER — APPOINTMENT (OUTPATIENT)
Dept: RADIOLOGY | Facility: CLINIC | Age: 87
End: 2022-11-02

## 2022-11-02 ENCOUNTER — OUTPATIENT (OUTPATIENT)
Dept: OUTPATIENT SERVICES | Facility: HOSPITAL | Age: 87
LOS: 1 days | End: 2022-11-02
Payer: COMMERCIAL

## 2022-11-02 ENCOUNTER — RESULT REVIEW (OUTPATIENT)
Age: 87
End: 2022-11-02

## 2022-11-02 DIAGNOSIS — Z98.890 OTHER SPECIFIED POSTPROCEDURAL STATES: Chronic | ICD-10-CM

## 2022-11-02 DIAGNOSIS — Z00.8 ENCOUNTER FOR OTHER GENERAL EXAMINATION: ICD-10-CM

## 2022-11-02 DIAGNOSIS — M54.9 DORSALGIA, UNSPECIFIED: ICD-10-CM

## 2022-11-02 DIAGNOSIS — S82.892A OTHER FRACTURE OF LEFT LOWER LEG, INITIAL ENCOUNTER FOR CLOSED FRACTURE: Chronic | ICD-10-CM

## 2022-11-02 DIAGNOSIS — Z90.89 ACQUIRED ABSENCE OF OTHER ORGANS: Chronic | ICD-10-CM

## 2022-11-02 PROCEDURE — 62323 NJX INTERLAMINAR LMBR/SAC: CPT

## 2022-11-03 ENCOUNTER — APPOINTMENT (OUTPATIENT)
Dept: INTERNAL MEDICINE | Facility: CLINIC | Age: 87
End: 2022-11-03

## 2022-11-03 VITALS
WEIGHT: 161 LBS | HEART RATE: 77 BPM | HEIGHT: 60 IN | OXYGEN SATURATION: 98 % | BODY MASS INDEX: 31.61 KG/M2 | TEMPERATURE: 97.6 F | DIASTOLIC BLOOD PRESSURE: 71 MMHG | SYSTOLIC BLOOD PRESSURE: 136 MMHG

## 2022-11-03 DIAGNOSIS — Z23 ENCOUNTER FOR IMMUNIZATION: ICD-10-CM

## 2022-11-03 PROCEDURE — 99214 OFFICE O/P EST MOD 30 MIN: CPT

## 2022-11-03 RX ORDER — HYDROCHLOROTHIAZIDE 25 MG/1
25 TABLET ORAL DAILY
Qty: 90 | Refills: 3 | Status: DISCONTINUED | COMMUNITY
Start: 2022-06-08 | End: 2022-11-03

## 2022-11-03 RX ORDER — NORMAL SALT TABLETS 1 G/G
1 TABLET ORAL
Qty: 2 | Refills: 0 | Status: DISCONTINUED | COMMUNITY
Start: 2022-10-04

## 2022-11-03 RX ORDER — ONDANSETRON 4 MG/1
4 TABLET, ORALLY DISINTEGRATING ORAL
Qty: 14 | Refills: 0 | Status: DISCONTINUED | COMMUNITY
Start: 2022-10-04

## 2022-11-03 NOTE — PLAN
[FreeTextEntry1] : * referral for fasting labs lipids, A1c, sodium serum\par * low cholesterol, low carbohydrates diet counselling\par * continue losartan- HCTZ\par * continue omeprazole\par * high dose influenza vaccine administered\par * follow up one month

## 2022-11-03 NOTE — HISTORY OF PRESENT ILLNESS
[FreeTextEntry1] : follow up\par Interview and discussion conducted in French by French speaking physician\par  [de-identified] : 87 years old female with HTN, GERD s/p laparoscopic hiatal hernia repair, presents for follow up, she states feeling well, refers stomach upset , sensation of hunger

## 2022-11-17 ENCOUNTER — APPOINTMENT (OUTPATIENT)
Dept: SURGERY | Facility: CLINIC | Age: 87
End: 2022-11-17

## 2022-11-27 ENCOUNTER — NON-APPOINTMENT (OUTPATIENT)
Age: 87
End: 2022-11-27

## 2022-12-01 ENCOUNTER — APPOINTMENT (OUTPATIENT)
Dept: INTERNAL MEDICINE | Facility: CLINIC | Age: 87
End: 2022-12-01

## 2022-12-01 VITALS
SYSTOLIC BLOOD PRESSURE: 138 MMHG | HEIGHT: 60 IN | WEIGHT: 157 LBS | DIASTOLIC BLOOD PRESSURE: 76 MMHG | TEMPERATURE: 97.7 F | OXYGEN SATURATION: 98 % | BODY MASS INDEX: 30.82 KG/M2 | HEART RATE: 86 BPM

## 2022-12-01 DIAGNOSIS — Z91.81 HISTORY OF FALLING: ICD-10-CM

## 2022-12-01 DIAGNOSIS — R05.8 OTHER SPECIFIED COUGH: ICD-10-CM

## 2022-12-01 DIAGNOSIS — J06.9 ACUTE UPPER RESPIRATORY INFECTION, UNSPECIFIED: ICD-10-CM

## 2022-12-01 PROCEDURE — 99213 OFFICE O/P EST LOW 20 MIN: CPT

## 2022-12-01 RX ORDER — FLUTICASONE PROPIONATE 50 UG/1
50 SPRAY, METERED NASAL DAILY
Qty: 1 | Refills: 1 | Status: ACTIVE | COMMUNITY
Start: 2022-12-01 | End: 1900-01-01

## 2022-12-01 RX ORDER — BENZONATATE 100 MG/1
100 CAPSULE ORAL 3 TIMES DAILY
Qty: 21 | Refills: 0 | Status: ACTIVE | COMMUNITY
Start: 2022-12-01 | End: 1900-01-01

## 2022-12-01 NOTE — PLAN
[FreeTextEntry1] : * start benzonatate 100 mg tid\par * fluticasone nasal spray\par * instructed to call back if worsening symptoms\par * meloxicam as needed for back pain\par * free water restriction for hyponatremia\par * follow up as scheduled

## 2022-12-01 NOTE — HISTORY OF PRESENT ILLNESS
[FreeTextEntry1] : cough [de-identified] : 87 years old female presents complaining of cough for two weeks, cough dry , with nasal congestion, denies fever, she denies SOB; she went to urgent care [facility] on Monday 11/28,  had covid test done negative, she fell at home in bathtub,  over back, refers also back pain; she had chest x rays done normal, no infiltrates

## 2022-12-15 ENCOUNTER — APPOINTMENT (OUTPATIENT)
Dept: NEUROLOGY | Facility: CLINIC | Age: 87
End: 2022-12-15

## 2022-12-15 VITALS
WEIGHT: 160 LBS | HEIGHT: 60 IN | DIASTOLIC BLOOD PRESSURE: 80 MMHG | SYSTOLIC BLOOD PRESSURE: 155 MMHG | HEART RATE: 97 BPM | BODY MASS INDEX: 31.41 KG/M2

## 2022-12-15 DIAGNOSIS — G47.00 INSOMNIA, UNSPECIFIED: ICD-10-CM

## 2022-12-15 DIAGNOSIS — G47.9 SLEEP DISORDER, UNSPECIFIED: ICD-10-CM

## 2022-12-15 PROCEDURE — 99204 OFFICE O/P NEW MOD 45 MIN: CPT

## 2022-12-15 NOTE — PHYSICAL EXAM
[FreeTextEntry1] : PHYSICAL EXAM\par Constitutional: Alert, no acute distress \par Psychiatric: appropriate affect and mood\par Pulmonary: No respiratory distress, stable on room air\par \par NEUROLOGICAL EXAM\par Mental status: The patient is alert, attentive, and conversational memory intact. AOx4\par Speech/language: Clear and fluent with intact comprehension\par Cranial nerves:\par CN II: Visual fields are full to confrontation. Pupil size equal and briskly reactive to light. \par CN III, IV, VI: EOMI, no nystagmus, no ptosis\par CN V: Facial sensation is intact to pinprick in all 3 divisions bilaterally.\par CN VII: Face is symmetric with normal eye closure and smile.\par CN VII: Hearing is normal to rubbing fingers\par CN IX, X: Palate elevates symmetrically. Phonation is normal.\par CN XI: Head turning and shoulder shrug are intact\par CN XII: Tongue is midline with normal movements and no atrophy. Mallampati score 3\par Motor: Strength is full bilaterally. 5/5 muscle power in bilateral UE and LE.\par Reflexes: Biceps and patellar 2+, absent ankle reflex Plantar response: Withdraws\par Sensory: Intact sensations to light touch in upper and lower extremities. \par Coordination: There is no dysmetria on finger-to-nose.\par Gait/Stance: Slight antalgic gait. Normal arm swing. \par \par \par \par \par

## 2022-12-15 NOTE — HISTORY OF PRESENT ILLNESS
[FreeTextEntry1] : HPI (initial visit Dec 15, 2022)- BASSEM DOMINGUEZ is a 87 year old right handed Romansh speaking woman w/ hx of HTN, GERD, lumbosacral stenosis w/ radiculopathy,  self referred to see neurology for insomnia. \par \par She is accompanied by daughter, who interprets for patient. Pt lives with daughter. \par \par Pt reports in the last month she has been having trouble sleeping at night. She says for some years it has been on and off, now more common. "I just sleep a little at night and then wake up". She takes melatonin 12 mg at bedtime,  this does not help. Trouble falling asleep and staying asleep. She goes to bed at around 9 pm, and not sure when she falls  asleep, if she does. She is out of bed at 6 am. She does snore at night. She does wake up to use bathroom, but not always. No nightmares. No dream enactment. Not fresh in the morning. Feels a little dizzy and a little nauseous in mornings, with mild headaches on occasion. She does have dry mouth. She does fall asleep when inactive during the day. No recent weight gain. \par

## 2022-12-15 NOTE — ASSESSMENT
[FreeTextEntry1] : 87 year old female with long standing hx of sleep disturbances- insomnia and snoring with daytime somnolence. \par \par Neurologically there are no concerns at this time. \par \par I would recommend referral to sleep disorder specialist at this time.\par \par I have recommended referral to sleep medicine for evaluation of sleep disorder; possible obstructive sleep apnea. Discussed the importance of sleep apnea evaluation and treatment, if needed. If left untreated, obstructive sleep apnea can increase the risk of health problems, including: High blood pressure, Stroke, Heart failure, and heart attacks. Untreated sleep apnea can also cause headaches and cognitive difficulties.\par \par

## 2022-12-22 ENCOUNTER — APPOINTMENT (OUTPATIENT)
Dept: PULMONOLOGY | Facility: CLINIC | Age: 87
End: 2022-12-22
Payer: MEDICARE

## 2022-12-22 VITALS
SYSTOLIC BLOOD PRESSURE: 141 MMHG | OXYGEN SATURATION: 99 % | RESPIRATION RATE: 15 BRPM | DIASTOLIC BLOOD PRESSURE: 81 MMHG | WEIGHT: 160 LBS | TEMPERATURE: 98 F | HEART RATE: 89 BPM | BODY MASS INDEX: 31.41 KG/M2 | HEIGHT: 60 IN

## 2022-12-22 DIAGNOSIS — R06.83 SNORING: ICD-10-CM

## 2022-12-22 DIAGNOSIS — F51.02 ADJUSTMENT INSOMNIA: ICD-10-CM

## 2022-12-22 PROCEDURE — 99214 OFFICE O/P EST MOD 30 MIN: CPT

## 2022-12-22 NOTE — HISTORY OF PRESENT ILLNESS
[Never] : never [Awakes Unrefreshed] : awakes unrefreshed [Awakes with Dry Mouth] : awakes with dry mouth [Awakes with Headache] : awakes with headache [Frequent Nocturnal Awakening] : frequent nocturnal awakening [Snoring] : snoring [Witnessed Apneas] : witnessed apneas [TextBox_4] : This is an 87-year-old female with no significant past medical history who comes in for an evaluation of insomnia.\par \par Patient indicates that she has had bouts of times throughout her life where she has had difficulty staying asleep.  Her most recent episode started roughly 3 weeks ago.  She indicates that she lies in bed between 9 PM and 8:30 AM in hopes that she gets some good sleep.  She states that she has almost 20 awakenings in the middle of the night.  She does not feel refreshed during this time.  There are accounts of loud snoring and witnessed apnea.  She has tried melatonin and other herbal remedies such as valerian root which have not been successful.  She initially went to go see a neurologist to determine if this was a neurological issue but was referred by the neurologist due to the lack of neurological symptoms.  She indicates that this is starting to affect her daily life.  She also admits to having some anxiety and depression specifically after the death of her  roughly 5 years ago.\par \par _______________________________________________________________________________\par \par Subraye qué tan frecuentemente se queda dormido Ud. en cada keila de las siguientes\par situaciones (beny el día) :\par  0 = nunca 1 = solo algunas veces 2 = muchas veces 3 = andres siempre\par \par 0------Sentado leyendo:\par 1------Viendo la televisión:\par 0------Sentado, inactivo en un lugar público:\par 2------[place] pasajero en un viaje de keila hora (o más) sin paradas:\par 0------Acostado descansando por la tarde:\par 0------Sentado platicando con alguien:\par 2------Sentado cómodamente después de comer, sin ann tomado bebidas alcohólicas:\par 0------Viajando en un transporte detenido en el tráfico:\par 4------Total\par \par [place] Version of the [place] Sleepiness Scale The Open Sleep Journal, 2009, Volume 2 7\par \par ________________________________________________________________________________\par \par  [Difficulty Initiating Sleep] : does not have difficulty initiating sleep [Recent  Weight Gain] : no recent weight gain [Tired while Driving] : not tired while driving [TextBox_77] : 9:00pm [TextBox_79] : 8:30am [TextBox_81] : 120 [TextBox_89] : 20 [ESS] : 4

## 2022-12-22 NOTE — PHYSICAL EXAM
[No Acute Distress] : no acute distress [Elongated Uvula] : elongated uvula [IV] : Mallampati Class: IV [Normal Appearance] : normal appearance [No Neck Mass] : no neck mass [Normal Rate/Rhythm] : normal rate/rhythm [Normal S1, S2] : normal s1, s2 [No Murmurs] : no murmurs [No Resp Distress] : no resp distress [Clear to Auscultation Bilaterally] : clear to auscultation bilaterally [No Clubbing] : no clubbing [No Cyanosis] : no cyanosis [No Edema] : no edema [No Focal Deficits] : no focal deficits [Oriented x3] : oriented x3 [Normal Affect] : normal affect

## 2022-12-22 NOTE — ASSESSMENT
[FreeTextEntry1] : This is an 87-year-old female with no significant past medical history who comes in for an evaluation of possible insomnia.\par \par #Sleep disordered breathing–patient is experiencing insomnia likely secondary to sleep maintenance issues.  Given the account of loud snoring and witnessed apnea she needs to undergo an in lab polysomnography to determine if she is having any sleep disordered breathing or any other events is causing frequent nocturnal awakenings.  She is tried many other medications which have not helped and I described to her that we cannot start any other medications until we rule out sleep disordered breathing.\par \par We also discussed that she requires appropriate sleep hygiene which would require her to get out of bed if she is not asleep.  Staying 12 hours in bed with a decrease in sleep efficiency only propagates insomnia.  Discussed with her other aspects of sleep hygiene as well in order to maximize her chance of falling asleep and staying asleep.\par \par We also discussed that anxiety and depression might be a contributing factor to her poor sleep.  It appears that she is still not over her  who passed away 5 years ago.  She asked if she needs to go see a psychologist or psychiatrist which I agree that she should do so.  She denies suicidal ideation or plan at this time.\par \par Patient will follow up with me after she undergoes her in lab polysomnography.

## 2023-01-23 ENCOUNTER — APPOINTMENT (OUTPATIENT)
Dept: SLEEP CENTER | Facility: CLINIC | Age: 88
End: 2023-01-23

## 2023-01-30 ENCOUNTER — APPOINTMENT (OUTPATIENT)
Dept: ORTHOPEDIC SURGERY | Facility: CLINIC | Age: 88
End: 2023-01-30
Payer: MEDICARE

## 2023-01-30 VITALS
WEIGHT: 160 LBS | TEMPERATURE: 97.5 F | OXYGEN SATURATION: 98 % | HEART RATE: 82 BPM | HEIGHT: 60 IN | DIASTOLIC BLOOD PRESSURE: 71 MMHG | SYSTOLIC BLOOD PRESSURE: 171 MMHG | BODY MASS INDEX: 31.41 KG/M2

## 2023-01-30 PROCEDURE — 99214 OFFICE O/P EST MOD 30 MIN: CPT

## 2023-01-30 RX ORDER — GABAPENTIN 100 MG/1
100 CAPSULE ORAL TWICE DAILY
Qty: 28 | Refills: 0 | Status: ACTIVE | COMMUNITY
Start: 2023-01-30 | End: 1900-01-01

## 2023-01-30 NOTE — PHYSICAL EXAM
[de-identified] : Lumbar Physical Exam\par \par Gait -antalgic gait\par \par Station -forward pitched\par \par Sagittal balance -positive\par \par Compensatory mechanism? -Bent hips and bent knees\par \par \par \par Reflexes\par Patellar - normal\par Gastroc - normal\par Clonus - No\par \par Hip Exam - Normal\par \par Straight leg raise - none\par \par Pulses - 2+ dp/pt\par \par Range of motion - normal\par \par Sensation \par Sensation intact to light touch in L1, L2, L3, L4, L5 and S1 dermatomes bilaterally\par \par Motor\par 	IP	Quad	HS	TA	Gastroc	EHL\par Right	4/5	5/5	5/5	5/5	5/5	5/5\par Left	4/5	5/5	5/5	5/5	5/5	5/5 [de-identified] : Scoliosis radiographs\par Approximately 70 degrees of pelvic incidence\par 55 degrees of lumbar lordosis\par \par Lumbar radiographs\par Multiple areas of disc degeneration and facet arthropathy\par \par Lumbar MRI\par L5-S1  spondylolisthesis\par Moderate to severe spinal stenosis at L5-S1

## 2023-01-30 NOTE — ADDENDUM
Hospitalist Progress Note   Admit Date:  2022  5:38 PM   Name:  Kashif Lin   Age:  69 y.o. Sex:  female  :  1947   MRN:  [id number]   Room:  [facility]/    Presenting Complaint: Skin Problem and Abdominal Pain     Reason(s) for Admission: Cellulitis of abdominal wall [L03.311]  Abdominal wall cellulitis [H62.012]     Hospital Course & Interval History:   Please refer to the admission H&P for details of presentation. In summary, Kashif Lin is a 69 y.o. 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 Patient had 800cc of fluids removed from her abdomen via pluerex on . Subjective/24 hr Events (22) : Patient is seen and examined at bedside. No acute events reported overnight by nursing staff. Reports feeling ok. Sister at bedside who reports worsening abdominal distension with redness. Initially thought that it was due to tegaderm around the plurex but the redness had spread across her entire abdomen. Patient denies fever, chills, chest pains, shortness of breath, n/v, abdominal pain. Review of Systems: 10 point review of systems is otherwise negative with the exception of the elements mentioned above. Assessment & Plan:   Abdominal wall cellulitis  In setting of frequent ascitic fluid drainage via pleur-ex drain. Removed ~550 cc of ascitic fluids via pleurx drain  at bedside.   22: Ascitic fluid sample obtained yesterday showed WBC of 541, ascitic glucose of 214, LDH of 214.  - broad spectrum abx with vancomycin and zosyn  - follow up blood cultures and ascites cultures    Dyslipidemia: on prvastatin    Type II diabetes mellitus ([facility] [address]  22: FS low at bedtime and this AM. Sister states she isnt used to low FS.  - decrease lantus to 38U BID  - decrease prandial to 12U [facility]  - ISS    Liver [FreeTextEntry1] : I, Mateus Pelletier, acted solely as a scribe for Dr. Eddie Augustin MD on this date 01/30/2023  .\par  \par All medical record entries made by the Scribe were at my, Dr. Eddie Augustin MD., direction and personally dictated by me on 01/30/2023 . I have reviewed the chart and agree that the record accurately reflects my personal performance of the history, physical exam, assessment and plan. I have also personally directed, reviewed, and agreed with the chart.  cirrhosis secondary to LOWERY with Ascites  Esophageal varices   05/26/22: ascitic fluid analysis done yesterday showed SAAG 1.3 which signigies portal hypertension as cause of ascites. - change bumex 1mg TID to bumex 2mg IV BID  - on spironolactone, lactulose, propanolol, rifaximin  - add benadryl PO PRN for pruritis     Diet:  ADULT DIET; Regular; 3 carb choices (45 gm/meal)  DVT PPx: lovenox  Code status: Full Code      I have reviewed and ordered clinical lab tests and independently visualized images, tracing. I spent 31 minutes of time caring for this patient at bedside or nearby, more than 50 percent of which was spent on coordination of care and/or counseling regarding the disease process, treatment options, and treatment plan. Discussed with patient's sister at bedside. 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) ([facility] [address] 5/24/2022 Yes    CAD (coronary artery disease) 5/24/2022 Yes            Objective:     Patient Vitals for the past 24 hrs:   Temp Pulse Resp BP SpO2   05/26/22 1114 98 °F (36.7 °C) 75 20 116/64 97 %   05/26/22 0701 -- 76 -- -- --   05/26/22 0700 97.9 °F (36.6 °C) 76 18 124/72 96 %   05/26/22 0304 98.5 °F (36.9 °C) 74 16 (!) 130/56 97 %   05/25/22 2236 98.4 °F (36.9 °C) 75 18 111/61 96 %   05/25/22 1927 98.8 °F (37.1 °C) 77 18 (!) 95/56 96 %   05/25/22 1511 98.6 °F (37 °C) 75 16 118/71 99 %       Estimated body mass index is 30.66 kg/m² as calculated from the following:    Height as of this encounter: 5' 3\" (1.6 m).     Weight as of this encounter: 173 lb 1 oz (78.5 kg). Intake/Output Summary (Last 24 hours) at 5/26/2022 1258  Last data filed at 5/26/2022 0806  Gross per 24 hour   Intake 480 ml   Output --   Net 480 ml         Physical Exam:     Blood pressure 116/64, pulse 75, temperature 98 °F (36.7 °C), resp. rate 20, height 5' 3\" (1.6 m), weight 173 lb 1 oz (78.5 kg), SpO2 97 %. General:    Chronically ill appearing. No overt distress  Head:  Normocephalic, atraumatic  Eyes:  Sclerae appear normal.  Pupils equally round. ENT:  Nares appear normal, no drainage. Moist oral mucosa  Neck:  No restricted ROM. Trachea midline   CV:   RRR. No m/r/g. No jugular venous distension. Lungs:   CTAB. No wheezing. Respirations even, unlabored  Abdomen: Bowel sounds present. Non tender, distended and slightly firm. +pleurex drain Warmth/erythema consistent with cellulitis seen on abdominal wall. Extremities: No cyanosis or clubbing. No edema  Skin:     No rashes and normal coloration. Warm and dry. Neuro:  CN II-XII grossly intact. Sensation intact. A&Ox3  Psych:  Normal mood and affect.       I have reviewed ordered lab tests and independently visualized imaging below:    Recent Labs:  Recent Results (from the past 48 hour(s))   Culture, Blood 1    Collection Time: 05/24/22  6:19 PM    Specimen: Blood   Result Value Ref Range    Special Requests RIGHT  ARM        Culture NO GROWTH 2 DAYS     CBC with Auto Differential    Collection Time: 05/24/22  6:19 PM   Result Value Ref Range    WBC 5.1 4.3 - 11.1 K/uL    RBC 3.97 (L) 4.05 - 5.2 M/uL    Hemoglobin 11.1 (L) 11.7 - 15.4 g/dL    Hematocrit 33.1 (L) 35.8 - 46.3 %    MCV 83.4 79.6 - 97.8 FL    MCH 28.0 26.1 - 32.9 PG    MCHC 33.5 31.4 - 35.0 g/dL    RDW 14.6 11.9 - 14.6 %    Platelets 314 513 - 449 K/uL    MPV 10.9 9.4 - 12.3 FL    nRBC 0.00 0.0 - 0.2 K/uL    Differential Type AUTOMATED      Seg Neutrophils 67 43 - 78 %    Lymphocytes 15 13 - 44 %    Monocytes 15 (H) 4.0 - 12.0 %    Eosinophils % 2 0.5 - 7.8 %    Basophils 1 0.0 - 2.0 %    Immature Granulocytes 1 0.0 - 5.0 %    Segs Absolute 3.4 1.7 - 8.2 K/UL    Absolute Lymph # 0.8 0.5 - 4.6 K/UL    Absolute Mono # 0.7 0.1 - 1.3 K/UL    Absolute Eos # 0.1 0.0 - 0.8 K/UL    Basophils Absolute 0.0 0.0 - 0.2 K/UL    Absolute Immature Granulocyte 0.1 0.0 - 0.5 K/UL   Comprehensive Metabolic Panel    Collection Time: 05/24/22  6:19 PM   Result Value Ref Range    Sodium 130 (L) 136 - 145 mmol/L    Potassium 4.5 3.5 - 5.1 mmol/L    Chloride 95 (L) 98 - 107 mmol/L    CO2 29 21 - 32 mmol/L    Anion Gap 6 (L) 7 - 16 mmol/L    Glucose 478 (HH) 65 - 100 mg/dL    BUN 26 (H) 8 - 23 MG/DL    CREATININE 0.82 0.6 - 1.0 MG/DL    GFR African American >60 >60 ml/min/1.73m2    GFR Non- >60 >60 ml/min/1.73m2    Calcium 8.6 8.3 - 10.4 MG/DL    Total Bilirubin 0.6 0.2 - 1.1 MG/DL    ALT 37 12 - 65 U/L    AST 43 (H) 15 - 37 U/L    Alk Phosphatase 214 (H) 50 - 136 U/L    Total Protein 6.7 6.3 - 8.2 g/dL    Albumin 2.1 (L) 3.2 - 4.6 g/dL    Globulin 4.6 (H) 2.3 - 3.5 g/dL    Albumin/Globulin Ratio 0.5 (L) 1.2 - 3.5     Lipase    Collection Time: 05/24/22  6:19 PM   Result Value Ref Range    Lipase 318 73 - 393 U/L   Procalcitonin    Collection Time: 05/24/22  6:19 PM   Result Value Ref Range    Procalcitonin 0.17 0.00 - 0.49 ng/mL   Lactic Acid    Collection Time: 05/24/22  6:19 PM   Result Value Ref Range    Lactic Acid, Plasma 1.4 0.4 - 2.0 MMOL/L   Culture, Blood 1    Collection Time: 05/24/22  6:29 PM    Specimen: Blood   Result Value Ref Range    Special Requests RIGHT  Antecubital        Culture NO GROWTH 2 DAYS     POCT Glucose    Collection Time: 05/24/22 10:32 PM   Result Value Ref Range    POC Glucose 322 (H) 65 - 100 mg/dL    Performed by: Deann    Comprehensive Metabolic Panel w/ Reflex to MG    Collection Time: 05/25/22  4:01 AM   Result Value Ref Range    Sodium 135 (L) 136 - 145 mmol/L    Potassium 5.0 3.5 - 5.1 mmol/L    Chloride 99 98 - 107 mmol/L    CO2 32 21 - 32 mmol/L    Anion Gap 4 (L) 7 - 16 mmol/L    Glucose 388 (H) 65 - 100 mg/dL    BUN 23 8 - 23 MG/DL    CREATININE 1.02 (H) 0.6 - 1.0 MG/DL    GFR African American >60 >60 ml/min/1.73m2    GFR Non- 56 (L) >60 ml/min/1.73m2    Calcium 9.0 8.3 - 10.4 MG/DL    Total Bilirubin 0.8 0.2 - 1.1 MG/DL    ALT 31 12 - 65 U/L    AST 38 (H) 15 - 37 U/L    Alk Phosphatase 198 (H) 50 - 130 U/L    Total Protein 6.7 6.3 - 8.2 g/dL    Albumin 2.0 (L) 3.2 - 4.6 g/dL    Globulin 4.7 (H) 2.3 - 3.5 g/dL    Albumin/Globulin Ratio 0.4 (L) 1.2 - 3.5     CBC with Auto Differential    Collection Time: 05/25/22  4:01 AM   Result Value Ref Range    WBC 4.9 4.3 - 11.1 K/uL    RBC 3.92 (L) 4.05 - 5.2 M/uL    Hemoglobin 10.8 (L) 11.7 - 15.4 g/dL    Hematocrit 33.1 (L) 35.8 - 46.3 %    MCV 84.4 79.6 - 97.8 FL    MCH 27.6 26.1 - 32.9 PG    MCHC 32.6 31.4 - 35.0 g/dL    RDW 14.5 11.9 - 14.6 %    Platelets 783 282 - 980 K/uL    MPV 10.5 9.4 - 12.3 FL    nRBC 0.00 0.0 - 0.2 K/uL    Seg Neutrophils 67 43 - 78 %    Lymphocytes 12 (L) 13 - 44 %    Monocytes 16 (H) 4.0 - 12.0 %    Eosinophils % 3 0.5 - 7.8 %    Basophils 1 0.0 - 2.0 %    Immature Granulocytes 1 0.0 - 5.0 %    Segs Absolute 3.3 1.7 - 8.2 K/UL    Absolute Lymph # 0.6 0.5 - 4.6 K/UL    Absolute Mono # 0.8 0.1 - 1.3 K/UL    Absolute Eos # 0.1 0.0 - 0.8 K/UL    Basophils Absolute 0.1 0.0 - 0.2 K/UL    Absolute Immature Granulocyte 0.1 0.0 - 0.5 K/UL    Differential Type AUTOMATED     Hemoglobin A1c    Collection Time: 05/25/22  4:01 AM   Result Value Ref Range    Hemoglobin A1C 8.2 (H) 4.20 - 6.30 %    eAG 189 mg/dL   POCT Glucose    Collection Time: 05/25/22  5:55 AM   Result Value Ref Range    POC Glucose 343 (H) 65 - 100 mg/dL    Performed by: Deann    POCT Glucose    Collection Time: 05/25/22 11:49 AM   Result Value Ref Range    POC Glucose 266 (H) 65 - 100 mg/dL    Performed by: Marylee Jumper    Body fluid cell count    Collection Time: 05/25/22  4:55 PM   Result Value Ref Range    Specimen ASCITIC FLUID       Color, Fluid YELLOW      Appearance, Fluid CLOUDY      RBC, Fluid 6,000 /cu mm    WBC, Fluid 541 /cu mm    Segmented Neutrophils, BAL 12 %    Lymphocytes, BAL 36 %    Macrophages, BAL 52 %   Protein, Body Fluid    Collection Time: 05/25/22  4:55 PM   Result Value Ref Range    Fluid Type ASCITIC FLUID       Total Protein, Body Fluid 1.7 g/dL   Albumin, Body Fluid    Collection Time: 05/25/22  4:55 PM   Result Value Ref Range    Fluid Type ASCITIC FLUID       Albumin, Fluid 0.6 g/dL   Culture, Body Fluid    Collection Time: 05/25/22  4:55 PM    Specimen: Ascitic Fluid; Body Fluid   Result Value Ref Range    Special Requests NO SPECIAL REQUESTS      Gram stain PENDING     Culture        No growth after short period of incubation. Further results to follow after overnight incubation.    Glucose, Body Fluid    Collection Time: 05/25/22  4:55 PM   Result Value Ref Range    Fluid Type ASCITIC FLUID       Glucose, Body Fluid 214 MG/DL   Lactate Dehydrogenase, Body Fluid    Collection Time: 05/25/22  4:55 PM   Result Value Ref Range    Fluid Type ASCITIC FLUID       LD, Fluid 75 U/L   POCT Glucose    Collection Time: 05/25/22  5:04 PM   Result Value Ref Range    POC Glucose 130 (H) 65 - 100 mg/dL    Performed by: Stacy Flor    POCT Glucose    Collection Time: 05/25/22  8:37 PM   Result Value Ref Range    POC Glucose 93 65 - 100 mg/dL    Performed by: Deann    Vancomycin Level, Random    Collection Time: 05/26/22  5:02 AM   Result Value Ref Range    Vancomycin Rm 19.5 UG/ML   CBC    Collection Time: 05/26/22  5:02 AM   Result Value Ref Range    WBC 4.5 4.3 - 11.1 K/uL    RBC 3.96 (L) 4.05 - 5.2 M/uL    Hemoglobin 11.0 (L) 11.7 - 15.4 g/dL    Hematocrit 33.0 (L) 35.8 - 46.3 %    MCV 83.3 79.6 - 97.8 FL    MCH 27.8 26.1 - 32.9 PG    MCHC 33.3 31.4 - 35.0 g/dL    RDW 14.6 11.9 - 14.6 %    Platelets 638 838 - 704 K/uL    MPV 10.5 for further evaluation of organs. FINDINGS: Partially included lung bases are unchanged. Partially visualized heart is enlarged, with scattered calcifications. Hiatal hernia and paraesophageal varices have not changed. Abdomen: Cirrhosis, splenomegaly, dilatation of portal vein and splenic veins, extensive upper abdominal varices, cholecystectomy are again evident. No acute changes in the liver or spleen. The adrenal glands and pancreas appear unremarkable. There is normal enhancement of the kidneys, no hydronephrosis. Large volume ascites has slightly increased. Diffuse subcutaneous and skin thickening or noted throughout the body wall. Patent major vessels. Aorta normal caliber. No free air. No interval lymphadenopathy. Drainage catheter inserted via right lower quadrant approach with tip in the left midabdomen. There is no evidence of bowel obstruction. GI evaluation is limited without oral contrast. Large volume stool compatible with constipation. No bowel hernia. Pelvis:  No acute inflammatory changes or fluid collections in the pelvis. No lymphadenopathy or mass. Uterus and ovaries are present. Urinary bladder is unremarkable as seen. Bones: No acute osseous lesions. 1. Previous ventral hernia and small bowel obstruction have resolved. 2. No evidence of abscess. 3. Ascites and anasarca. 4. Cirrhosis, portal hypertension. 5. Cholecystectomy. 6. Constipation.  GI details are limited without oral contrast.      Current Meds:  Current Facility-Administered Medications   Medication Dose Route Frequency    vancomycin (VANCOCIN) 1250 mg in sodium chloride 0.9% 250 mL IVPB  1,250 mg IntraVENous Q24H    insulin lispro (HUMALOG) injection vial 12 Units  12 Units SubCUTAneous TID WC    insulin glargine (LANTUS) injection vial 38 Units  38 Units SubCUTAneous BID    albumin human 25 % IV solution 25 g  25 g IntraVENous Q6H    acetaminophen (TYLENOL) tablet 1,000 mg  1,000 mg Oral Q6H PRN    aspirin chewable tablet 81 mg  81 mg Oral Daily    bumetanide (BUMEX) tablet 1 mg  1 mg Oral TID    escitalopram (LEXAPRO) tablet 5 mg  5 mg Oral Daily    ferrous sulfate (IRON 325) tablet 325 mg  325 mg Oral QAM AC    lactulose (CHRONULAC) 10 GM/15ML solution 20 g  30 mL Oral TID    magnesium oxide (MAG-OX) tablet 400 mg  400 mg Oral BID    midodrine (PROAMATINE) tablet 5 mg  5 mg Oral TID WC    trospium (SANCTURA) tablet 20 mg  20 mg Oral BID    pantoprazole (PROTONIX) tablet 40 mg  40 mg Oral BID    pravastatin (PRAVACHOL) tablet 40 mg  40 mg Oral Nightly    propranolol (INDERAL) tablet 20 mg  20 mg Oral BID    rifAXIMin (XIFAXAN) tablet 550 mg  550 mg Oral BID    spironolactone (ALDACTONE) tablet 100 mg  100 mg Oral Daily    zinc sulfate (ZINCATE) capsule 50 mg  50 mg Oral Daily    sodium chloride flush 0.9 % injection 5-40 mL  5-40 mL IntraVENous 2 times per day    sodium chloride flush 0.9 % injection 5-40 mL  5-40 mL IntraVENous PRN    0.9 % sodium chloride infusion   IntraVENous PRN    ondansetron (ZOFRAN-ODT) disintegrating tablet 4 mg  4 mg Oral Q8H PRN    Or    ondansetron (ZOFRAN) injection 4 mg  4 mg IntraVENous Q6H PRN    polyethylene glycol (GLYCOLAX) packet 17 g  17 g Oral Daily PRN    acetaminophen (TYLENOL) tablet 650 mg  650 mg Oral Q6H PRN    Or    acetaminophen (TYLENOL) suppository 650 mg  650 mg Rectal Q6H PRN    enoxaparin (LOVENOX) injection 40 mg  40 mg SubCUTAneous Q24H    piperacillin-tazobactam (ZOSYN) 3,375 mg in sodium chloride 0.9 % 50 mL IVPB (mini-bag)  3,375 mg IntraVENous q8h    glucose chewable tablet 16 g  4 tablet Oral PRN    dextrose 5 % solution  100 mL/hr IntraVENous PRN    dextrose bolus 10% 125 mL  125 mL IntraVENous PRN    Or    dextrose bolus 10% 250 mL  250 mL IntraVENous PRN    glucagon (rDNA) injection 1 mg  1 mg IntraMUSCular PRN    insulin lispro (HUMALOG) injection vial 0-16 Units  0-16 Units SubCUTAneous TID WC    insulin lispro (HUMALOG) injection vial 0-4 Units  0-4 Units SubCUTAneous Nightly       Signed:  Rand Samuel MD    Part of this note may have been written by using a voice dictation software. The note has been proof read but may still contain some grammatical/other typographical errors.

## 2023-01-30 NOTE — HISTORY OF PRESENT ILLNESS
[de-identified] : Today the patient states that she is still dealing with some fairly significant back and left lower extremity symptoms. She states she is interested in another epidural injection, she had good relief with the last epidural injection for 2-3 months.\par \par 09/01/22\par Today the patient states that she is still dealing with some fairly significant back and left lower extremity symptoms.  She does have pain which radiates into her left medial thigh as well as her left lateral thigh, posterior lateral calf.  She does state that the symptoms can be disabling at times.  They are largely unchanged since her last visit.  Unfortunate her last epidural steroid injection did not substantially improve her symptoms.  She is here today to discuss next steps in treatment.\par \par 03/25/22\par Today the patient states that she has had a return of her lower extremity symptoms.  She does have pain which radiates down her posterior thigh bilaterally, left worse than right.  She recently had an injection in January which did help her symptoms significantly.  Unfortunately her symptoms have returned.  She denies any bowel bladder issues.  She denies any saddle anesthesia.\par \par 11/17/21\par Today the patient states that overall her symptoms have improved.  She still does have bilateral posterior thigh posterior calf pain.  Left side is worse than her right side.  She denies any bowel bladder issues.  She denies any saddle anesthesia.  She still has decreased walking tolerance.  She is here today to discuss next steps in care.\par \par 11/04/21\par This is an 86-year-old female that is here today for evaluation of her back and leg symptoms.  She complains of bilateral leg symptoms.  Left is worse than her right side.  She complains of left and right anterior thigh pain.  She also complains of left posterior lateral thigh posterior lateral calf pain.  She complains of intermittent cramping in her legs especially whenever she walks for any prolonged amount of time.  At this point she cannot even walk 1 block due to significant pain.  She has had these symptoms for years but over the past 1 year she has had acute worsening of her overall symptoms.  She denies any bowel bladder issues.  She denies any saddle anesthesia.

## 2023-02-01 ENCOUNTER — NON-APPOINTMENT (OUTPATIENT)
Age: 88
End: 2023-02-01

## 2023-02-03 ENCOUNTER — EMERGENCY (EMERGENCY)
Facility: HOSPITAL | Age: 88
LOS: 1 days | Discharge: HOME | End: 2023-02-03
Attending: EMERGENCY MEDICINE | Admitting: EMERGENCY MEDICINE
Payer: COMMERCIAL

## 2023-02-03 VITALS
WEIGHT: 160.06 LBS | SYSTOLIC BLOOD PRESSURE: 188 MMHG | RESPIRATION RATE: 17 BRPM | TEMPERATURE: 98 F | DIASTOLIC BLOOD PRESSURE: 77 MMHG | OXYGEN SATURATION: 96 % | HEIGHT: 59 IN | HEART RATE: 91 BPM

## 2023-02-03 DIAGNOSIS — Z98.890 OTHER SPECIFIED POSTPROCEDURAL STATES: Chronic | ICD-10-CM

## 2023-02-03 DIAGNOSIS — S82.892A OTHER FRACTURE OF LEFT LOWER LEG, INITIAL ENCOUNTER FOR CLOSED FRACTURE: Chronic | ICD-10-CM

## 2023-02-03 DIAGNOSIS — Z90.89 ACQUIRED ABSENCE OF OTHER ORGANS: Chronic | ICD-10-CM

## 2023-02-03 LAB
ALBUMIN SERPL ELPH-MCNC: 4 G/DL — SIGNIFICANT CHANGE UP (ref 3.3–5)
ALP SERPL-CCNC: 110 U/L — SIGNIFICANT CHANGE UP (ref 40–120)
ALT FLD-CCNC: 25 U/L — SIGNIFICANT CHANGE UP (ref 12–78)
ANION GAP SERPL CALC-SCNC: 7 MMOL/L — SIGNIFICANT CHANGE UP (ref 5–17)
APPEARANCE UR: CLEAR — SIGNIFICANT CHANGE UP
APTT BLD: 29.5 SEC — SIGNIFICANT CHANGE UP (ref 27.5–35.5)
AST SERPL-CCNC: 24 U/L — SIGNIFICANT CHANGE UP (ref 15–37)
BASOPHILS # BLD AUTO: 0.08 K/UL — SIGNIFICANT CHANGE UP (ref 0–0.2)
BASOPHILS NFR BLD AUTO: 0.8 % — SIGNIFICANT CHANGE UP (ref 0–2)
BILIRUB SERPL-MCNC: 0.2 MG/DL — SIGNIFICANT CHANGE UP (ref 0.2–1.2)
BILIRUB UR-MCNC: NEGATIVE — SIGNIFICANT CHANGE UP
BUN SERPL-MCNC: 18 MG/DL — SIGNIFICANT CHANGE UP (ref 7–23)
CALCIUM SERPL-MCNC: 10 MG/DL — SIGNIFICANT CHANGE UP (ref 8.5–10.1)
CHLORIDE SERPL-SCNC: 99 MMOL/L — SIGNIFICANT CHANGE UP (ref 96–108)
CO2 SERPL-SCNC: 24 MMOL/L — SIGNIFICANT CHANGE UP (ref 22–31)
COLOR SPEC: SIGNIFICANT CHANGE UP
CREAT SERPL-MCNC: 0.63 MG/DL — SIGNIFICANT CHANGE UP (ref 0.5–1.3)
D DIMER BLD IA.RAPID-MCNC: 156 NG/ML DDU — SIGNIFICANT CHANGE UP
DIFF PNL FLD: NEGATIVE — SIGNIFICANT CHANGE UP
EGFR: 86 ML/MIN/1.73M2 — SIGNIFICANT CHANGE UP
EOSINOPHIL # BLD AUTO: 0.23 K/UL — SIGNIFICANT CHANGE UP (ref 0–0.5)
EOSINOPHIL NFR BLD AUTO: 2.4 % — SIGNIFICANT CHANGE UP (ref 0–6)
EPI CELLS # UR: SIGNIFICANT CHANGE UP
GLUCOSE SERPL-MCNC: 121 MG/DL — HIGH (ref 70–99)
GLUCOSE UR QL: NEGATIVE — SIGNIFICANT CHANGE UP
HCT VFR BLD CALC: 31.4 % — LOW (ref 34.5–45)
HGB BLD-MCNC: 10.5 G/DL — LOW (ref 11.5–15.5)
IMM GRANULOCYTES NFR BLD AUTO: 1.1 % — HIGH (ref 0–0.9)
INR BLD: 1.04 RATIO — SIGNIFICANT CHANGE UP (ref 0.88–1.16)
KETONES UR-MCNC: NEGATIVE — SIGNIFICANT CHANGE UP
LEUKOCYTE ESTERASE UR-ACNC: ABNORMAL
LYMPHOCYTES # BLD AUTO: 3.39 K/UL — HIGH (ref 1–3.3)
LYMPHOCYTES # BLD AUTO: 35.6 % — SIGNIFICANT CHANGE UP (ref 13–44)
MCHC RBC-ENTMCNC: 28.2 PG — SIGNIFICANT CHANGE UP (ref 27–34)
MCHC RBC-ENTMCNC: 33.4 GM/DL — SIGNIFICANT CHANGE UP (ref 32–36)
MCV RBC AUTO: 84.4 FL — SIGNIFICANT CHANGE UP (ref 80–100)
MONOCYTES # BLD AUTO: 0.96 K/UL — HIGH (ref 0–0.9)
MONOCYTES NFR BLD AUTO: 10.1 % — SIGNIFICANT CHANGE UP (ref 2–14)
NEUTROPHILS # BLD AUTO: 4.75 K/UL — SIGNIFICANT CHANGE UP (ref 1.8–7.4)
NEUTROPHILS NFR BLD AUTO: 50 % — SIGNIFICANT CHANGE UP (ref 43–77)
NITRITE UR-MCNC: NEGATIVE — SIGNIFICANT CHANGE UP
NRBC # BLD: 0 /100 WBCS — SIGNIFICANT CHANGE UP (ref 0–0)
PH UR: 7 — SIGNIFICANT CHANGE UP (ref 5–8)
PLATELET # BLD AUTO: 189 K/UL — SIGNIFICANT CHANGE UP (ref 150–400)
POTASSIUM SERPL-MCNC: 4.1 MMOL/L — SIGNIFICANT CHANGE UP (ref 3.5–5.3)
POTASSIUM SERPL-SCNC: 4.1 MMOL/L — SIGNIFICANT CHANGE UP (ref 3.5–5.3)
PROT SERPL-MCNC: 8.2 G/DL — SIGNIFICANT CHANGE UP (ref 6–8.3)
PROT UR-MCNC: 30 MG/DL
PROTHROM AB SERPL-ACNC: 12.2 SEC — SIGNIFICANT CHANGE UP (ref 10.5–13.4)
RBC # BLD: 3.72 M/UL — LOW (ref 3.8–5.2)
RBC # FLD: 14.8 % — HIGH (ref 10.3–14.5)
RBC CASTS # UR COMP ASSIST: SIGNIFICANT CHANGE UP /HPF (ref 0–4)
SARS-COV-2 RNA SPEC QL NAA+PROBE: SIGNIFICANT CHANGE UP
SODIUM SERPL-SCNC: 130 MMOL/L — LOW (ref 135–145)
SP GR SPEC: 1 — LOW (ref 1.01–1.02)
UROBILINOGEN FLD QL: NEGATIVE — SIGNIFICANT CHANGE UP
WBC # BLD: 9.51 K/UL — SIGNIFICANT CHANGE UP (ref 3.8–10.5)
WBC # FLD AUTO: 9.51 K/UL — SIGNIFICANT CHANGE UP (ref 3.8–10.5)
WBC UR QL: SIGNIFICANT CHANGE UP

## 2023-02-03 PROCEDURE — 74176 CT ABD & PELVIS W/O CONTRAST: CPT | Mod: MA

## 2023-02-03 PROCEDURE — 80053 COMPREHEN METABOLIC PANEL: CPT

## 2023-02-03 PROCEDURE — 96375 TX/PRO/DX INJ NEW DRUG ADDON: CPT

## 2023-02-03 PROCEDURE — 85025 COMPLETE CBC W/AUTO DIFF WBC: CPT

## 2023-02-03 PROCEDURE — 99285 EMERGENCY DEPT VISIT HI MDM: CPT | Mod: 25

## 2023-02-03 PROCEDURE — 99285 EMERGENCY DEPT VISIT HI MDM: CPT

## 2023-02-03 PROCEDURE — 93005 ELECTROCARDIOGRAM TRACING: CPT

## 2023-02-03 PROCEDURE — 96374 THER/PROPH/DIAG INJ IV PUSH: CPT

## 2023-02-03 PROCEDURE — 85610 PROTHROMBIN TIME: CPT

## 2023-02-03 PROCEDURE — 71250 CT THORAX DX C-: CPT | Mod: 26,MA

## 2023-02-03 PROCEDURE — 81001 URINALYSIS AUTO W/SCOPE: CPT

## 2023-02-03 PROCEDURE — 71250 CT THORAX DX C-: CPT | Mod: MA

## 2023-02-03 PROCEDURE — 85730 THROMBOPLASTIN TIME PARTIAL: CPT

## 2023-02-03 PROCEDURE — 93010 ELECTROCARDIOGRAM REPORT: CPT

## 2023-02-03 PROCEDURE — 36415 COLL VENOUS BLD VENIPUNCTURE: CPT

## 2023-02-03 PROCEDURE — 85379 FIBRIN DEGRADATION QUANT: CPT

## 2023-02-03 PROCEDURE — 87086 URINE CULTURE/COLONY COUNT: CPT

## 2023-02-03 PROCEDURE — 74176 CT ABD & PELVIS W/O CONTRAST: CPT | Mod: 26,MA

## 2023-02-03 PROCEDURE — 87635 SARS-COV-2 COVID-19 AMP PRB: CPT

## 2023-02-03 RX ORDER — ONDANSETRON 8 MG/1
4 TABLET, FILM COATED ORAL ONCE
Refills: 0 | Status: COMPLETED | OUTPATIENT
Start: 2023-02-03 | End: 2023-02-03

## 2023-02-03 RX ORDER — LIDOCAINE 4 G/100G
1 CREAM TOPICAL ONCE
Refills: 0 | Status: COMPLETED | OUTPATIENT
Start: 2023-02-03 | End: 2023-02-03

## 2023-02-03 RX ORDER — SODIUM CHLORIDE 9 MG/ML
500 INJECTION INTRAMUSCULAR; INTRAVENOUS; SUBCUTANEOUS ONCE
Refills: 0 | Status: COMPLETED | OUTPATIENT
Start: 2023-02-03 | End: 2023-02-03

## 2023-02-03 RX ORDER — ONDANSETRON 8 MG/1
1 TABLET, FILM COATED ORAL
Qty: 30 | Refills: 0
Start: 2023-02-03

## 2023-02-03 RX ORDER — MORPHINE SULFATE 50 MG/1
4 CAPSULE, EXTENDED RELEASE ORAL ONCE
Refills: 0 | Status: DISCONTINUED | OUTPATIENT
Start: 2023-02-03 | End: 2023-02-03

## 2023-02-03 RX ORDER — DOCUSATE SODIUM 100 MG
1 CAPSULE ORAL
Qty: 30 | Refills: 0
Start: 2023-02-03

## 2023-02-03 RX ORDER — OXYCODONE HYDROCHLORIDE 5 MG/1
1 TABLET ORAL
Qty: 20 | Refills: 0
Start: 2023-02-03

## 2023-02-03 RX ADMIN — SODIUM CHLORIDE 250 MILLILITER(S): 9 INJECTION INTRAMUSCULAR; INTRAVENOUS; SUBCUTANEOUS at 21:45

## 2023-02-03 RX ADMIN — ONDANSETRON 4 MILLIGRAM(S): 8 TABLET, FILM COATED ORAL at 21:52

## 2023-02-03 RX ADMIN — LIDOCAINE 1 PATCH: 4 CREAM TOPICAL at 23:10

## 2023-02-03 RX ADMIN — MORPHINE SULFATE 4 MILLIGRAM(S): 50 CAPSULE, EXTENDED RELEASE ORAL at 21:52

## 2023-02-03 NOTE — ED PROVIDER NOTE - PATIENT PORTAL LINK FT
You can access the FollowMyHealth Patient Portal offered by [facility] by registering at the following website: [email or web address]. By joining [employer]’s FollowMyHealth portal, you will also be able to view your health information using other applications (apps) compatible with our system.

## 2023-02-03 NOTE — ED PROVIDER NOTE - CARE PROVIDER_API CALL
Willard Moody)  Orthopaedic Surgery  [address] [place] [address]  Phone: [phone]  Fax: [phone]  Follow Up Time:     Filemon Feliciano)  Orthopaedic Surgery  [address] [facility], [address] [place] [address]  Phone: [phone]  Fax: [phone]  Follow Up Time:

## 2023-02-03 NOTE — ED PROVIDER NOTE - CONSTITUTIONAL, MLM
normal... obese female who is holding her upper back no distress unless she moves about then she grimaces, cant lay back due to pain

## 2023-02-03 NOTE — ED PROVIDER NOTE - INTERPRETATION SERVICES DECLINED
breathing is unlabored without accessory muscle use,normal breath sounds Patient/Caregiver requests family/friend to interpret.

## 2023-02-03 NOTE — ED PROVIDER NOTE - MUSCULOSKELETAL, MLM
Spine appears normal, there is no midline bony pain to percussion or palpation she has moderate to severe kyphosis of the thoracic spine no slr, range of motion is not limited, no muscle or joint tenderness

## 2023-02-03 NOTE — ED PROVIDER NOTE - CARE PLAN
1 Principal Discharge DX:	Non-traumatic compression fracture of T2 thoracic vertebra  Secondary Diagnosis:	Back pain, acute

## 2023-02-03 NOTE — ED ADULT NURSE NOTE - NSIMPLEMENTINTERV_GEN_ALL_ED
Implemented All Fall with Harm Risk Interventions:  [place] to call system. Call bell, personal items and telephone within reach. Instruct patient to call for assistance. Room bathroom lighting operational. Non-slip footwear when patient is off stretcher. Physically safe environment: no spills, clutter or unnecessary equipment. Stretcher in lowest position, wheels locked, appropriate side rails in place. Provide visual cue, wrist band, yellow gown, etc. Monitor gait and stability. Monitor for mental status changes and reorient to person, place, and time. Review medications for side effects contributing to fall risk. Reinforce activity limits and safety measures with patient and family. Provide visual clues: red socks.

## 2023-02-03 NOTE — ED PROVIDER NOTE - NSFOLLOWUPINSTRUCTIONS_ED_ALL_ED_FT
tylenol for mild pain  for more severe pain use oxycodone with caution and use stool softners  for nausea use zofran  purchase over the counter: Salon Pas lidocaine patch on for 12 hours off for 12 hours  Follow up with your primary care doctor and on call orthopedist dr Moody or spine surgeon dr Feliciano  NO REACHING BENDING TWISTING HEAVY LIFTING  light activity as tolerated  call 911 for worsening symptoms or any concerns    Fractura por aplastamiento vertebral    Spinal Compression Fracture       La fractura por aplastamiento vertebral es la quebradura de los huesos que rahul la columna (vértebras). En brandie tipo de fractura, las vértebras son aplastadas (se comprimen) y quedan en forma de cuña. En estrada mayoría, las fracturas por aplastamiento se producen en la sección central o inferior de la columna vertebral.      ¿Cuáles son las causas?    Esta afección puede ser causada por lo siguiente:  •El debilitamiento y la pérdida de la densidad ósea (osteoporosis). Esta es la causa más frecuente.      •Keila caída.      •Un accidente de automóvil o motocicleta.      •Cáncer.      •Un traumatismo, osbaldo un golpe ashley y directo en la corwin o la espalda.        ¿Qué incrementa el riesgo?    Es más probable que sufra esta afección si:  •Es mayor de 60 años.      •Tiene osteoporosis.    •Tiene ciertos tipos de cáncer, osbaldo:  •Mieloma múltiple.      •Linfoma.      •Cáncer de próstata.      •Cáncer de pulmón.      •Cáncer de mama.          ¿Cuáles son los signos o síntomas?    Los síntomas de esta afección incluyen:  •Dolor intenso al hacer movimientos simples osbaldo toser o estornudar.      •Dolor que empeora con el tiempo.      •Dolor que empeora al ponerse de pie, caminar, sentarse o inclinarse.      •Dolor repentino que es tan intenso que dificulta el movimiento.      •Columna vertebral curvada o jorobada.      •Disminución gradual de la estatura.      •Entumecimiento, hormigueo o debilidad en la espalda y las piernas.      •Dificultad para caminar.      Los síntomas dependerán de la causa de la fractura y de la rapidez de estrada evolución.      ¿Cómo se diagnostica?    Esta afección se puede diagnosticar en función de los síntomas, la historia clínica y un examen físico. Beny el examen físico, el médico puede darle golpes suaves a lo effie de la columna vertebral para determinar si hay dolor a la palpación. Pueden hacerse estudios para confirmar el diagnóstico. Estos pueden incluir los siguientes:  •Keila prueba de densidad mineral ósea para determinar la presencia de osteoporosis.      •Estudios de diagnóstico por imágenes, osbaldo keila radiografía, keila exploración por tomografía computarizada (TC) o keila resonancia magnética (RM) de la columna vertebral.        ¿Cómo se trata?    El tratamiento depende de la causa y la gravedad de la afección. Algunas fracturas pueden consolidarse por sí solas con tratamiento de apoyo. El tratamiento puede incluir lo siguiente:  •Analgésicos.      •Reposo.      •Un dispositivo ortopédico para la espalda.      •Ejercicios de fisioterapia.      •Medicamentos para fortalecer los huesos.      •Suplementos de calcio y vitamina D.      Las fracturas que causan deformidad de la espalda, neuralgia o debilidad, o que no responden a otros tratamientos se pueden tratar con cirugía. Puede incluir:  •Vertebroplastia. Se inyecta cemento óseo en las vértebras aplastadas para estabilizarlas.      •Cifoplastia con balón. Las vértebras aplastadas se expanden con un balón y luego se les inyecta el cemento óseo.      •Fusión vertebral. Las vértebras aplastadas se fusionan con las vértebras normales.        Siga estas instrucciones en estrada casa:    Medicamentos     •Use los medicamentos de venta rui y los recetados solamente osbaldo se lo haya indicado el médico.    •Pregúntele al médico si el medicamento recetado:   •Hace necesario que evite conducir o usar maquinaria.     •Puede causarle estreñimiento. Es posible que tenga que zenia estas medidas para prevenir o tratar el estreñimiento:   •Beber suficiente líquido osbaldo para mantener la orina de color amarillo pálido.       •Usar medicamentos recetados o de venta rui.       •Consumir alimentos ricos en fibra, osbaldo frijoles, cereales integrales, y frutas y verduras frescas.       •Limitar el consumo de alimentos ricos en grasa y azúcares procesados, osbaldo los alimentos fritos o dulces.          Si tiene un aparato ortopédico:     •Úselo osbaldo se lo haya indicado el médico. Quíteselo solamente osbaldo se lo haya indicado el médico.       • Afloje el dispositivo ortopédico si siente hormigueo en los dedos de las sharon o de los pies, o si estos se le entumecen o se le enfrían y se tornan de color hernan.      •Manténgalo limpio.    •Si el dispositivo no es impermeable:  •No deje que se moje.      •Cúbralo con un envoltorio hermético cuando tome un baño de inmersión o keila ducha.          Control del dolor, la rigidez y la hinchazón    •Si se lo indican, aplique hielo sobre la mark de la lesión. Para hacer esto:  •Si usa un dispositivo ortopédico desmontable, quíteselo según las indicaciones del médico.      •Ponga el hielo en keila bolsa plástica.      •Coloque keila toalla entre la piel y la bolsa.      •Aplique el hielo beny 20 minutos, 2 o 3 veces por día.      •Retire el hielo si la piel se pone de color lezama brillante. [place] es muy importante. Si no puede sentir dolor, calor o frío, tiene un mayor riesgo de que se dañe la mark.        Actividad     •Delfina reposo osbaldo se lo haya indicado el médico.       •Evite estar sentado beny largos períodos sin moverse. Levántese y camine un poco cada 1 a 2 horas. [place] es importante para mejorar el flujo sanguíneo y la respiración. Pida ayuda si se siente débil o inestable.      •Retome ember actividades normales según lo indicado por el médico. Pregunte qué actividades son seguras para usted.      •Delfina ejercicios de fisioterapia para mejorar la movilidad y fortalecer la espalda osbaldo se lo haya recomendado el médico.      •Delfina ejercicio regularmente o osbaldo se lo haya indicado el médico.        Indicaciones generales      • No heather alcohol. El alcohol puede interferir en el tratamiento.      • No consuma ningún producto que contenga nicotina o tabaco, osbaldo cigarrillos, cigarrillos electrónicos y tabaco de mascar. Estos pueden retrasar la consolidación del hueso. Si necesita ayuda para dejar de consumir estos productos, consulte al médico.      •Cumpla con todas las visitas de seguimiento. [place] es importante. Puede ayudar a evitar las lesiones permanentes, la discapacidad y el dolor prolongado (crónico).        Comuníquese con un médico si:    •Tiene fiebre.      •El medicamento no le calma el dolor.      •El dolor no mejora con el tiempo.      •No puede retomar las actividades normales según lo planeado o lo esperado.        Solicite ayuda de inmediato si:    •El dolor es muy intenso y empeora repentinamente.      •No puede  alguna parte del cuerpo (parálisis) por debajo del nivel de la lesión.      •Tiene entumecimiento, hormigueo o debilidad en alguna parte del cuerpo por debajo del nivel de la lesión.      •No puede controlar los intestinos o la vejiga.        Resumen    •La fractura por aplastamiento vertebral es la quebradura de los huesos que rahul la columna (vértebras).      •En brandie tipo de fractura, las vértebras son aplastadas (se comprimen) y quedan en forma de cuña.      •Los síntomas y el tratamiento dependerán de la causa y la gravedad de la fractura y de la rapidez de estrada evolución.      •Algunas fracturas pueden consolidarse por sí solas con tratamiento de apoyo. Las fracturas que causan deformidad de la espalda, neuralgia o debilidad, o que no responden a otros tratamientos se pueden tratar con cirugía.      Esta información no tiene osbaldo fin reemplazar el consejo del médico. Asegúrese de hacerle al médico cualquier pregunta que tenga. elena silva

## 2023-02-03 NOTE — ED PROVIDER NOTE - PROGRESS NOTE DETAILS
Reevaluated patient at bedside.  Patient feeling much improved.  Discussed the results of all diagnostic testing in ED and copies of all reports given.   An opportunity to ask questions was given.  Discussed the importance of prompt, close medical follow-up.  Patient will return with any changes, concerns or persistent / worsening symptoms.  Understanding of all instructions verbalized.  PT AND DAUGHTER ALSO AWARE OF THE INCIDENTAL FINDINGS ON CT INCLUDING BREAST LESION THYROID LESION AND HERNIA. DAUGHTER IS AWARE AND WILL FOLLOW WITH PMD  pt was also counselled on use of opioid medications and spinal precautions.

## 2023-02-03 NOTE — ED PROVIDER NOTE - OBJECTIVE STATEMENT
Patient is an 87-year-old female with a history of HTN, acid reflux with hiatal hernia surgery history, has been coughing for 2 to 3 weeks.  Went to urgent care yesterday because of the cough and was put on doxycycline.  She also was on losartan for blood pressure and has a history of sciatica.  She has no history of tobacco use or alcohol use has no drug allergies.  Primary care physician is Dr. Alvin Garcia.  For the past 24 hours starting yesterday morning she has had progressively worsening thoracolumbar back pain without provocation other than her cough.  She denies any fever or chills.  She denies any rash.  She denies any chest pain or shortness of breath.  Denies any recent trauma or travel.  She denies any weakness or numbness changes in bowel or bladder habits.  She has severe pain.  Brought in by her daughter for evaluation.  Her daughter is assisting with translation at the patient request.

## 2023-02-03 NOTE — ED PROVIDER NOTE - CLINICAL SUMMARY MEDICAL DECISION MAKING FREE TEXT BOX
Patient is an 87-year-old female with a history of sciatica, HTN, cough for 2 to 3 weeks status post hiatal hernia surgery.  Developed sudden onset of severe back pain yesterday.  This was without provocation or trauma.  The pain is severe with worse with movement, patient is uncertain what caused the pain.  Considerations for the pain include pneumothorax, pneumonia, intra-abdominal infection, vascular emergency, compression fracture due to osteoporosis and the persistent coughing, We will obtain EKG, chest x-ray, CAT scan of the chest abdomen and pelvis looking for vascular bony or intra-abdominal pathologies.  IV pain medication, admission laboratory studies renal function and urinalysis to look for kidney stone or infection.  Reassessment and disposition according to laboratory studies and radiology.  This chart was made with dictation software and may contain typographical errors.

## 2023-02-04 RX ORDER — OXYCODONE HYDROCHLORIDE 5 MG/1
1 TABLET ORAL
Qty: 20 | Refills: 0
Start: 2023-02-04 | End: 2023-02-08

## 2023-02-04 NOTE — ED POST DISCHARGE NOTE - REASON FOR FOLLOW-UP
Called by pharmacy, maximum daily dose to large.  New prescription needed.  New prescription sent. Other

## 2023-02-05 LAB
CULTURE RESULTS: SIGNIFICANT CHANGE UP
SPECIMEN SOURCE: SIGNIFICANT CHANGE UP

## 2023-02-16 ENCOUNTER — NON-APPOINTMENT (OUTPATIENT)
Age: 88
End: 2023-02-16

## 2023-02-21 ENCOUNTER — OUTPATIENT (OUTPATIENT)
Dept: OUTPATIENT SERVICES | Facility: HOSPITAL | Age: 88
LOS: 1 days | End: 2023-02-21
Payer: COMMERCIAL

## 2023-02-21 ENCOUNTER — APPOINTMENT (OUTPATIENT)
Dept: RADIOLOGY | Facility: CLINIC | Age: 88
End: 2023-02-21
Payer: MEDICARE

## 2023-02-21 ENCOUNTER — RESULT REVIEW (OUTPATIENT)
Age: 88
End: 2023-02-21

## 2023-02-21 DIAGNOSIS — M54.50 LOW BACK PAIN, UNSPECIFIED: ICD-10-CM

## 2023-02-21 DIAGNOSIS — Z90.89 ACQUIRED ABSENCE OF OTHER ORGANS: Chronic | ICD-10-CM

## 2023-02-21 DIAGNOSIS — S82.892A OTHER FRACTURE OF LEFT LOWER LEG, INITIAL ENCOUNTER FOR CLOSED FRACTURE: Chronic | ICD-10-CM

## 2023-02-21 DIAGNOSIS — Z98.890 OTHER SPECIFIED POSTPROCEDURAL STATES: Chronic | ICD-10-CM

## 2023-02-21 DIAGNOSIS — Z00.8 ENCOUNTER FOR OTHER GENERAL EXAMINATION: ICD-10-CM

## 2023-02-21 PROCEDURE — 62323 NJX INTERLAMINAR LMBR/SAC: CPT

## 2023-02-23 DIAGNOSIS — Z13.820 ENCOUNTER FOR SCREENING FOR OSTEOPOROSIS: ICD-10-CM

## 2023-03-02 ENCOUNTER — APPOINTMENT (OUTPATIENT)
Dept: INTERNAL MEDICINE | Facility: CLINIC | Age: 88
End: 2023-03-02

## 2023-03-24 ENCOUNTER — APPOINTMENT (OUTPATIENT)
Dept: ORTHOPEDIC SURGERY | Facility: CLINIC | Age: 88
End: 2023-03-24
Payer: MEDICARE

## 2023-03-24 VITALS — DIASTOLIC BLOOD PRESSURE: 75 MMHG | HEART RATE: 94 BPM | SYSTOLIC BLOOD PRESSURE: 156 MMHG

## 2023-03-24 PROCEDURE — 99215 OFFICE O/P EST HI 40 MIN: CPT

## 2023-03-24 RX ORDER — GABAPENTIN 100 MG/1
100 CAPSULE ORAL TWICE DAILY
Qty: 28 | Refills: 0 | Status: ACTIVE | COMMUNITY
Start: 2023-03-24 | End: 1900-01-01

## 2023-03-24 NOTE — ASSESSMENT
[FreeTextEntry1] : I had a long discussion with the patient in regards to her treatment plan and diagnosis. She does have lumbar radicular sxs due to Moderate to severe spinal stenosis at L5-S1. She has had multiple epidural steroid injection which provided temporary pain relief. In my opinion her MRI findings do match her clinical exam. She has tried and failed significant conservative management including epidural steroid injections, therapy, analgesics etc. Unfortunately their symptoms have persisted. Given her lack of improvement with conservative management, the fact that her imaging findings match her clinical exam I do think that she is an appropriate surgical candidate. \par \par At the time, the patient and the patient's family would like to hold off on surgery and would like to continue to exhaust conservative management. Therefore we will proceed with a referral to [facility] to have her managed by a Pain Management specialist. Rx Gabapentin \par \par The patient and her family will also take time to discuss the idea of surgery and will come back in 4-6 weeks with questions or concerns. I did go over the surgical plan using models and I did describe the postoperative recovery. All questions were answered today.

## 2023-03-24 NOTE — PHYSICAL EXAM
[de-identified] : Lumbar Physical Exam\par \par Gait -antalgic gait\par \par Station -forward pitched\par \par Sagittal balance -positive\par \par Compensatory mechanism? -Bent hips and bent knees\par \par \par \par Reflexes\par Patellar - normal\par Gastroc - normal\par Clonus - No\par \par Hip Exam - Normal\par \par Straight leg raise - none\par \par Pulses - 2+ dp/pt\par \par Range of motion - normal\par \par Sensation \par Sensation intact to light touch in L1, L2, L3, L4, L5 and S1 dermatomes bilaterally\par \par Motor\par 	IP	Quad	HS	TA	Gastroc	EHL\par Right	4/5	5/5	5/5	5/5	5/5	5/5\par Left	4/5	5/5	5/5	5/5	5/5	5/5 [de-identified] : Scoliosis radiographs\par Approximately 70 degrees of pelvic incidence\par 55 degrees of lumbar lordosis\par \par Lumbar radiographs\par Multiple areas of disc degeneration and facet arthropathy\par \par Lumbar MRI\par L5-S1  spondylolisthesis\par Moderate to severe spinal stenosis at L5-S1

## 2023-03-24 NOTE — HISTORY OF PRESENT ILLNESS
[de-identified] : 87 year old female who presents for follow-up evaluation of her lower back pain and left lower extremity sxs. Today, the patient reports she received another epidural injection that provided no relief to her lower back sxs. Patient reports her back sxs have exacerbated recently and have been affecting her daily lifestyle. Patient reports taking Tylenol/Motril for sxs with minor relief. \par Denies any saddle anesthesia, bowel/bladder incontinence. \par \par 01/30/23\par Today the patient states that she is still dealing with some fairly significant back and left lower extremity symptoms. She states she is interested in another epidural injection, she had good relief with the last epidural injection for 2-3 months.\par \par 09/01/22\par Today the patient states that she is still dealing with some fairly significant back and left lower extremity symptoms.  She does have pain which radiates into her left medial thigh as well as her left lateral thigh, posterior lateral calf.  She does state that the symptoms can be disabling at times.  They are largely unchanged since her last visit.  Unfortunate her last epidural steroid injection did not substantially improve her symptoms.  She is here today to discuss next steps in treatment.\par \par 03/25/22\par Today the patient states that she has had a return of her lower extremity symptoms.  She does have pain which radiates down her posterior thigh bilaterally, left worse than right.  She recently had an injection in January which did help her symptoms significantly.  Unfortunately her symptoms have returned.  She denies any bowel bladder issues.  She denies any saddle anesthesia.\par \par 11/17/21\par Today the patient states that overall her symptoms have improved.  She still does have bilateral posterior thigh posterior calf pain.  Left side is worse than her right side.  She denies any bowel bladder issues.  She denies any saddle anesthesia.  She still has decreased walking tolerance.  She is here today to discuss next steps in care.\par \par 11/04/21\par This is an 86-year-old female that is here today for evaluation of her back and leg symptoms.  She complains of bilateral leg symptoms.  Left is worse than her right side.  She complains of left and right anterior thigh pain.  She also complains of left posterior lateral thigh posterior lateral calf pain.  She complains of intermittent cramping in her legs especially whenever she walks for any prolonged amount of time.  At this point she cannot even walk 1 block due to significant pain.  She has had these symptoms for years but over the past 1 year she has had acute worsening of her overall symptoms.  She denies any bowel bladder issues.  She denies any saddle anesthesia.

## 2023-03-24 NOTE — ADDENDUM
[FreeTextEntry1] : I, Jacinda Moreno, acted solely as a scribe for Dr. Eddie Augustin MD on this date 03/24/2023  \par \par All medical record entries made by the Scribe were at my, Dr. Eddie Augustin MD., direction and personally dictated by me on 03/24/2023 . I have reviewed the chart and agree that the record accurately reflects my personal performance of the history, physical exam, assessment and plan. I have also personally directed, reviewed, and agreed with the chart.

## 2023-03-31 RX ORDER — OMEPRAZOLE 40 MG/1
40 CAPSULE, DELAYED RELEASE ORAL
Qty: 30 | Refills: 2 | Status: ACTIVE | COMMUNITY
Start: 2022-06-27 | End: 1900-01-01

## 2023-03-31 RX ORDER — TIZANIDINE 2 MG/1
2 TABLET ORAL EVERY 6 HOURS
Qty: 56 | Refills: 0 | Status: ACTIVE | COMMUNITY
Start: 2023-03-31 | End: 1900-01-01

## 2023-03-31 RX ORDER — DICLOFENAC SODIUM 50 MG/1
50 TABLET, DELAYED RELEASE ORAL
Qty: 28 | Refills: 0 | Status: ACTIVE | COMMUNITY
Start: 2023-03-31 | End: 1900-01-01

## 2023-04-01 ENCOUNTER — EMERGENCY (EMERGENCY)
Facility: HOSPITAL | Age: 88
LOS: 1 days | Discharge: HOME | End: 2023-04-01
Attending: EMERGENCY MEDICINE | Admitting: STUDENT IN AN ORGANIZED HEALTH CARE EDUCATION/TRAINING PROGRAM
Payer: COMMERCIAL

## 2023-04-01 VITALS
TEMPERATURE: 98 F | RESPIRATION RATE: 16 BRPM | HEIGHT: 59 IN | SYSTOLIC BLOOD PRESSURE: 138 MMHG | DIASTOLIC BLOOD PRESSURE: 60 MMHG | OXYGEN SATURATION: 96 % | HEART RATE: 97 BPM | WEIGHT: 171.96 LBS

## 2023-04-01 VITALS
HEART RATE: 88 BPM | SYSTOLIC BLOOD PRESSURE: 124 MMHG | RESPIRATION RATE: 18 BRPM | TEMPERATURE: 98 F | DIASTOLIC BLOOD PRESSURE: 66 MMHG | OXYGEN SATURATION: 97 %

## 2023-04-01 DIAGNOSIS — Z98.890 OTHER SPECIFIED POSTPROCEDURAL STATES: Chronic | ICD-10-CM

## 2023-04-01 DIAGNOSIS — Z90.89 ACQUIRED ABSENCE OF OTHER ORGANS: Chronic | ICD-10-CM

## 2023-04-01 DIAGNOSIS — S82.892A OTHER FRACTURE OF LEFT LOWER LEG, INITIAL ENCOUNTER FOR CLOSED FRACTURE: Chronic | ICD-10-CM

## 2023-04-01 LAB
APPEARANCE UR: CLEAR — SIGNIFICANT CHANGE UP
BACTERIA # UR AUTO: ABNORMAL
BILIRUB UR-MCNC: NEGATIVE — SIGNIFICANT CHANGE UP
COLOR SPEC: SIGNIFICANT CHANGE UP
DIFF PNL FLD: NEGATIVE — SIGNIFICANT CHANGE UP
EPI CELLS # UR: SIGNIFICANT CHANGE UP
GLUCOSE UR QL: NEGATIVE — SIGNIFICANT CHANGE UP
KETONES UR-MCNC: NEGATIVE — SIGNIFICANT CHANGE UP
LEUKOCYTE ESTERASE UR-ACNC: NEGATIVE — SIGNIFICANT CHANGE UP
NITRITE UR-MCNC: NEGATIVE — SIGNIFICANT CHANGE UP
PH UR: 8 — SIGNIFICANT CHANGE UP (ref 5–8)
PROT UR-MCNC: 15
RBC CASTS # UR COMP ASSIST: SIGNIFICANT CHANGE UP /HPF (ref 0–4)
SP GR SPEC: 1.01 — SIGNIFICANT CHANGE UP (ref 1.01–1.02)
UROBILINOGEN FLD QL: NEGATIVE — SIGNIFICANT CHANGE UP
WBC UR QL: SIGNIFICANT CHANGE UP

## 2023-04-01 PROCEDURE — 96374 THER/PROPH/DIAG INJ IV PUSH: CPT

## 2023-04-01 PROCEDURE — 99284 EMERGENCY DEPT VISIT MOD MDM: CPT | Mod: 25

## 2023-04-01 PROCEDURE — 99284 EMERGENCY DEPT VISIT MOD MDM: CPT

## 2023-04-01 PROCEDURE — 96375 TX/PRO/DX INJ NEW DRUG ADDON: CPT

## 2023-04-01 PROCEDURE — 81001 URINALYSIS AUTO W/SCOPE: CPT

## 2023-04-01 RX ORDER — DEXAMETHASONE 0.5 MG/5ML
10 ELIXIR ORAL ONCE
Refills: 0 | Status: COMPLETED | OUTPATIENT
Start: 2023-04-01 | End: 2023-04-01

## 2023-04-01 RX ORDER — LIDOCAINE 4 G/100G
1 CREAM TOPICAL ONCE
Refills: 0 | Status: COMPLETED | OUTPATIENT
Start: 2023-04-01 | End: 2023-04-01

## 2023-04-01 RX ORDER — SODIUM CHLORIDE 9 MG/ML
3 INJECTION INTRAMUSCULAR; INTRAVENOUS; SUBCUTANEOUS EVERY 8 HOURS
Refills: 0 | Status: DISCONTINUED | OUTPATIENT
Start: 2023-04-01 | End: 2023-04-04

## 2023-04-01 RX ORDER — OXYCODONE AND ACETAMINOPHEN 5; 325 MG/1; MG/1
1 TABLET ORAL
Qty: 12 | Refills: 0
Start: 2023-04-01 | End: 2023-04-03

## 2023-04-01 RX ORDER — ONDANSETRON 8 MG/1
4 TABLET, FILM COATED ORAL ONCE
Refills: 0 | Status: COMPLETED | OUTPATIENT
Start: 2023-04-01 | End: 2023-04-01

## 2023-04-01 RX ORDER — KETOROLAC TROMETHAMINE 30 MG/ML
15 SYRINGE (ML) INJECTION ONCE
Refills: 0 | Status: DISCONTINUED | OUTPATIENT
Start: 2023-04-01 | End: 2023-04-01

## 2023-04-01 RX ORDER — MORPHINE SULFATE 50 MG/1
4 CAPSULE, EXTENDED RELEASE ORAL ONCE
Refills: 0 | Status: DISCONTINUED | OUTPATIENT
Start: 2023-04-01 | End: 2023-04-01

## 2023-04-01 RX ORDER — OXYCODONE HYDROCHLORIDE 5 MG/1
5 TABLET ORAL ONCE
Refills: 0 | Status: DISCONTINUED | OUTPATIENT
Start: 2023-04-01 | End: 2023-04-01

## 2023-04-01 RX ADMIN — Medication 15 MILLIGRAM(S): at 15:19

## 2023-04-01 RX ADMIN — Medication 10 MILLIGRAM(S): at 12:13

## 2023-04-01 RX ADMIN — OXYCODONE HYDROCHLORIDE 5 MILLIGRAM(S): 5 TABLET ORAL at 12:12

## 2023-04-01 RX ADMIN — MORPHINE SULFATE 4 MILLIGRAM(S): 50 CAPSULE, EXTENDED RELEASE ORAL at 13:50

## 2023-04-01 RX ADMIN — OXYCODONE HYDROCHLORIDE 5 MILLIGRAM(S): 5 TABLET ORAL at 15:19

## 2023-04-01 RX ADMIN — MORPHINE SULFATE 4 MILLIGRAM(S): 50 CAPSULE, EXTENDED RELEASE ORAL at 15:19

## 2023-04-01 RX ADMIN — Medication 15 MILLIGRAM(S): at 13:50

## 2023-04-01 RX ADMIN — SODIUM CHLORIDE 3 MILLILITER(S): 9 INJECTION INTRAMUSCULAR; INTRAVENOUS; SUBCUTANEOUS at 13:57

## 2023-04-01 RX ADMIN — LIDOCAINE 1 PATCH: 4 CREAM TOPICAL at 12:13

## 2023-04-01 RX ADMIN — ONDANSETRON 4 MILLIGRAM(S): 8 TABLET, FILM COATED ORAL at 13:49

## 2023-04-01 NOTE — ED PROVIDER NOTE - CARE PROVIDER_API CALL
Eddie Augustin (MD)  [facility]  [address], [address]  [place] [address]  Phone: [phone]  Fax: [phone]  Follow Up Time: 1-3 Days

## 2023-04-01 NOTE — ED PROVIDER NOTE - MUSCULOSKELETAL, MLM
Tenderness to right lumbar paraspinal muscles.  No midline vertebral/lumbar tenderness.  Lower extremity strength 5/5 bilaterally.  DP pulses equal and intact bilaterally.  Patient ambulating with cane with discomfort.

## 2023-04-01 NOTE — ED PROVIDER NOTE - OBJECTIVE STATEMENT
87-year-old female with history of hypertension, GERD, chronic back pain presents to the ED complaining of worsening bilateral lower back pain right worse than left with radiation down right leg for the past 2 days.  Patient follows with orthospine Dr. Augustin.  Patient had telemetry health phone call yesterday due to worsening pain, he recommended adding Tylenol, tizanidine and diclofenac (in addition to Gabapentin which she is already taking)  Patient took first dose this morning with minimal improvement.  Patient had previous lumbar epidural on 2/21 with minimal improvement.  Patient has appointment with pain management doctor in 1 week.  No new injury or fall.  Daughter states that they live together, and she helps patient ambulate with her cane.  Pain improved with rest, worse with movement.  Denies fever, chills, chest pain, shortness of breath, abdominal pain, nausea, vomiting, lower extremity weakness or paresthesias. no UE/LE weakness or paresthesias, no saddle anesthesia, no bowel or bladder incontinence/retention, no prior back surgery

## 2023-04-01 NOTE — ED ADULT NURSE NOTE - NSIMPLEMENTINTERV_GEN_ALL_ED
Implemented All Fall Risk Interventions:  Switchback to call system. Call bell, personal items and telephone within reach. Instruct patient to call for assistance. Room bathroom lighting operational. Non-slip footwear when patient is off stretcher. Physically safe environment: no spills, clutter or unnecessary equipment. Stretcher in lowest position, wheels locked, appropriate side rails in place. Provide visual cue, wrist band, yellow gown, etc. Monitor gait and stability. Monitor for mental status changes and reorient to person, place, and time. Review medications for side effects contributing to fall risk. Reinforce activity limits and safety measures with patient and family.

## 2023-04-01 NOTE — ED ADULT TRIAGE NOTE - CHIEF COMPLAINT QUOTE
Pt comes to ED for right leg pain radiating up to her hips.  Pt denies falling.  Pt unable to stand or walk. Pt comes to ED for right leg pain radiating up to her hips.  Pt denies falling.  Pt unable to stand or walk.  Pt had a fall two days ago, denies CP, SOB, dizziness, or hitting head.

## 2023-04-01 NOTE — ED ADULT NURSE NOTE - NS ED NOTE  TALK SOMEONE YN
I reviewed the H&P, I examined the patient, and there are no changes in the patient's condition.   No

## 2023-04-01 NOTE — ED PROVIDER NOTE - CLINICAL SUMMARY MEDICAL DECISION MAKING FREE TEXT BOX
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  She follows with orthopedic/spine surgery at [facility] and pain management.  She has had increasing pain in the low back radiating to her right buttock, and was started on additional medications.  But this morning the meds did not work and patient is experiencing a lot of pain.  She denies any neurologic symptoms, no GI or  changes.  No motor weakness.  No sensory changes.  Just the pain in her back that occasionally shoots down her right leg.  She also has a history of HTN, GERD.  She denies any recent trauma or injury.  Brought in by her daughter with whom she lives for evaluation.  Patient walks at home with a quad cane.    On evaluation is an elderly female who appears uncomfortable.  Walks with an antalgic gait using her quad cane, favoring her right side.  HEENT is unremarkable.  No pallor no G/F/R.  Cardiopulmonary exam is unremarkable.  Abdominal exam is soft nontender no guarding rebound.  Musculoskeletal exam patient has mild lower lumbar pain with muscle spasm along the paraspinal muscles in the lumbar region especially on the right side.  She has no straight leg raise.  She is neurologic and vascular intact.  Skin is intact.  Plan of care includes pain management, anti-inflammatory medication, steroids muscle relaxants.  And counseling the patient the pain medicine seems to report patient was comfortable and resting.  Extensive discussion regarding plan of care was had with the daughter and the patient, patient has outpatient scheduled pain management visit next week.  We will continue to monitor.  This chart was made with dictation software and may contain typographical errors.

## 2023-04-01 NOTE — ED ADULT NURSE NOTE - CHIEF COMPLAINT QUOTE
Pt comes to ED for right leg pain radiating up to her hips.  Pt denies falling.  Pt unable to stand or walk.  Pt had a fall two days ago, denies CP, SOB, dizziness, or hitting head.

## 2023-04-01 NOTE — ED ADULT NURSE NOTE - OBJECTIVE STATEMENT
pt aox4, " pain across lower back radiating to rt leg area off, on x 1 year, fell few days ago" FROM good cap refill

## 2023-04-01 NOTE — ED PROVIDER NOTE - ATTENDING APP SHARED VISIT CONTRIBUTION OF CARE
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  She follows with orthopedic/spine surgery at [facility] and pain management.  She has had increasing pain in the low back radiating to her right buttock, and was started on additional medications.  But this morning the meds did not work and patient is experiencing a lot of pain.  She denies any neurologic symptoms, no GI or  changes.  No motor weakness.  No sensory changes.  Just the pain in her back that occasionally shoots down her right leg.  She also has a history of HTN, GERD.  She denies any recent trauma or injury.  Brought in by her daughter with whom she lives for evaluation.  Patient walks at home with a quad cane.    On evaluation is an elderly female who appears uncomfortable.  Walks with an antalgic gait using her quad cane, favoring her right side.  HEENT is unremarkable.  No pallor no G/F/R.  Cardiopulmonary exam is unremarkable.  Abdominal exam is soft nontender no guarding rebound.  Musculoskeletal exam patient has mild lower lumbar pain with muscle spasm along the paraspinal muscles in the lumbar region especially on the right side.  She has no straight leg raise.  She is neurologic and vascular intact.  Skin is intact.  Plan of care includes pain management, anti-inflammatory medication, steroids muscle relaxants.  And counseling the patient the pain medicine seems to report patient was comfortable and resting.  Extensive discussion regarding plan of care was had with the daughter and the patient, patient has outpatient scheduled pain management visit next week.  We will continue to monitor.  This chart was made with dictation software and may contain typographical errors.

## 2023-04-01 NOTE — ED PROVIDER NOTE - PATIENT PORTAL LINK FT
You can access the FollowMyHealth Patient Portal offered by [facility] by registering at the following website: [email or web address]. By joining [employer]’s FollowMyHealth portal, you will also be able to view your health information using other applications (apps) compatible with our system.

## 2023-04-01 NOTE — ED PROVIDER NOTE - CONSTITUTIONAL, MLM
normal... Uncomfortable appearing elderly female, awake, alert, oriented to person, place, time/situation and in no apparent distress.

## 2023-04-01 NOTE — ED ADULT NURSE NOTE - CAS TRG GEN SKIN CONDITION
Warm
Additional Notes: Patient consent was obtained to proceed with the visit and recommended plan of care after discussion of all risks and benefits, including the risks of COVID-19 exposure.
Detail Level: Simple

## 2023-04-01 NOTE — ED PROVIDER NOTE - PROGRESS NOTE DETAILS
pt ambulated to the restroom but has significant pain, iv pain meds ordered will re-assess after ua ordered as well aware there may be a delay

## 2023-04-10 ENCOUNTER — RX RENEWAL (OUTPATIENT)
Age: 88
End: 2023-04-10

## 2023-04-11 ENCOUNTER — NON-APPOINTMENT (OUTPATIENT)
Age: 88
End: 2023-04-11

## 2023-04-11 ENCOUNTER — APPOINTMENT (OUTPATIENT)
Dept: INTERNAL MEDICINE | Facility: CLINIC | Age: 88
End: 2023-04-11
Payer: MEDICARE

## 2023-04-11 VITALS
DIASTOLIC BLOOD PRESSURE: 64 MMHG | OXYGEN SATURATION: 99 % | HEIGHT: 60 IN | WEIGHT: 160 LBS | BODY MASS INDEX: 31.41 KG/M2 | SYSTOLIC BLOOD PRESSURE: 120 MMHG | HEART RATE: 98 BPM

## 2023-04-11 DIAGNOSIS — Z12.11 ENCOUNTER FOR SCREENING FOR MALIGNANT NEOPLASM OF COLON: ICD-10-CM

## 2023-04-11 DIAGNOSIS — Z00.00 ENCOUNTER FOR GENERAL ADULT MEDICAL EXAMINATION W/OUT ABNORMAL FINDINGS: ICD-10-CM

## 2023-04-11 DIAGNOSIS — R79.89 OTHER SPECIFIED ABNORMAL FINDINGS OF BLOOD CHEMISTRY: ICD-10-CM

## 2023-04-11 PROCEDURE — G0439: CPT

## 2023-04-11 RX ORDER — NEBIVOLOL 10 MG/1
10 TABLET ORAL DAILY
Qty: 90 | Refills: 1 | Status: DISCONTINUED | COMMUNITY
Start: 2022-06-08 | End: 2023-04-11

## 2023-04-11 NOTE — PLAN
[FreeTextEntry1] : * routine general labs done\par * age appropriate health maintenance recommendations reviewed, discussed including healthy diet, regular exercise\par * c/w losartan 50 mg\par * c/w gabapentin 100 mg twice a day for back pain\par * low cholesterol, low triglycerides diet,dietary counseling given; dietary avoidance discussed; diet and exercise reviewed with patient\par * FIT test\par * f/u one month.

## 2023-04-11 NOTE — HEALTH RISK ASSESSMENT
[Fair] :  ~his/her~ mood as fair [No] : No [One fall no injury in past year] : Patient reported one fall in the past year without injury [0] : 2) Feeling down, depressed, or hopeless: Not at all (0) [PHQ-2 Negative - No further assessment needed] : PHQ-2 Negative - No further assessment needed [HIV test declined] : HIV test declined [Hepatitis C test declined] : Hepatitis C test declined [None] : None [With Family] : lives with family [Retired] : retired [High School] : high school [] :  [# Of Children ___] : has [unfilled] children [Feels Safe at Home] : Feels safe at home [Smoke Detector] : smoke detector [Carbon Monoxide Detector] : carbon monoxide detector [Seat Belt] :  uses seat belt [Never] : Never [[id number]] : 0 [Change in mental status noted] : No change in mental status noted [Sexually Active] : not sexually active [Reports changes in hearing] : Reports no changes in hearing [Reports changes in vision] : Reports no changes in vision [Reports changes in dental health] : Reports no changes in dental health [Guns at Home] : no guns at home [de-identified] : needs help ; daughter helps her [de-identified] : daughter helps her

## 2023-04-12 LAB
25(OH)D3 SERPL-MCNC: 31.2 NG/ML
ALBUMIN SERPL ELPH-MCNC: 4.3 G/DL
ALP BLD-CCNC: 122 U/L
ALT SERPL-CCNC: 19 U/L
ANION GAP SERPL CALC-SCNC: 18 MMOL/L
APPEARANCE: CLEAR
AST SERPL-CCNC: 17 U/L
BACTERIA: NEGATIVE
BASOPHILS # BLD AUTO: 0.07 K/UL
BASOPHILS NFR BLD AUTO: 0.8 %
BILIRUB SERPL-MCNC: 0.3 MG/DL
BILIRUBIN URINE: NEGATIVE
BLOOD URINE: NEGATIVE
BUN SERPL-MCNC: 10 MG/DL
CALCIUM SERPL-MCNC: 10.4 MG/DL
CHLORIDE SERPL-SCNC: 88 MMOL/L
CHOLEST SERPL-MCNC: 144 MG/DL
CO2 SERPL-SCNC: 20 MMOL/L
COLOR: NORMAL
CREAT SERPL-MCNC: 0.51 MG/DL
EGFR: 91 ML/MIN/1.73M2
EOSINOPHIL # BLD AUTO: 0.28 K/UL
EOSINOPHIL NFR BLD AUTO: 3 %
ESTIMATED AVERAGE GLUCOSE: 123 MG/DL
GLUCOSE QUALITATIVE U: NEGATIVE
GLUCOSE SERPL-MCNC: 109 MG/DL
HBA1C MFR BLD HPLC: 5.9 %
HCT VFR BLD CALC: 33.8 %
HDLC SERPL-MCNC: 48 MG/DL
HGB BLD-MCNC: 11.3 G/DL
HYALINE CASTS: 2 /LPF
IMM GRANULOCYTES NFR BLD AUTO: 3.8 %
KETONES URINE: NEGATIVE
LDLC SERPL CALC-MCNC: 82 MG/DL
LEUKOCYTE ESTERASE URINE: ABNORMAL
LYMPHOCYTES # BLD AUTO: 3.22 K/UL
LYMPHOCYTES NFR BLD AUTO: 34.6 %
MAN DIFF?: NORMAL
MCHC RBC-ENTMCNC: 28.1 PG
MCHC RBC-ENTMCNC: 33.4 GM/DL
MCV RBC AUTO: 84.1 FL
MICROSCOPIC-UA: NORMAL
MONOCYTES # BLD AUTO: 0.92 K/UL
MONOCYTES NFR BLD AUTO: 9.9 %
NEUTROPHILS # BLD AUTO: 4.46 K/UL
NEUTROPHILS NFR BLD AUTO: 47.9 %
NITRITE URINE: NEGATIVE
NONHDLC SERPL-MCNC: 97 MG/DL
PH URINE: 7.5
PLATELET # BLD AUTO: 228 K/UL
POTASSIUM SERPL-SCNC: 4 MMOL/L
PROT SERPL-MCNC: 7.4 G/DL
PROTEIN URINE: ABNORMAL
RBC # BLD: 4.02 M/UL
RBC # FLD: 14.8 %
RED BLOOD CELLS URINE: 1 /HPF
SODIUM SERPL-SCNC: 125 MMOL/L
SPECIFIC GRAVITY URINE: 1.01
SQUAMOUS EPITHELIAL CELLS: 5 /HPF
T4 FREE SERPL-MCNC: 1.5 NG/DL
TRIGL SERPL-MCNC: 72 MG/DL
TSH SERPL-ACNC: 2.53 UIU/ML
UROBILINOGEN URINE: NORMAL
VIT B12 SERPL-MCNC: >2000 PG/ML
WBC # FLD AUTO: 9.3 K/UL
WHITE BLOOD CELLS URINE: 31 /HPF

## 2023-04-20 ENCOUNTER — APPOINTMENT (OUTPATIENT)
Dept: INTERNAL MEDICINE | Facility: CLINIC | Age: 88
End: 2023-04-20
Payer: MEDICARE

## 2023-04-20 VITALS
HEART RATE: 97 BPM | OXYGEN SATURATION: 98 % | RESPIRATION RATE: 16 BRPM | TEMPERATURE: 98.3 F | DIASTOLIC BLOOD PRESSURE: 81 MMHG | BODY MASS INDEX: 31.41 KG/M2 | SYSTOLIC BLOOD PRESSURE: 136 MMHG | WEIGHT: 160 LBS | HEIGHT: 60 IN

## 2023-04-20 DIAGNOSIS — R73.03 PREDIABETES.: ICD-10-CM

## 2023-04-20 DIAGNOSIS — R21 RASH AND OTHER NONSPECIFIC SKIN ERUPTION: ICD-10-CM

## 2023-04-20 DIAGNOSIS — E87.1 HYPO-OSMOLALITY AND HYPONATREMIA: ICD-10-CM

## 2023-04-20 DIAGNOSIS — L85.3 XEROSIS CUTIS: ICD-10-CM

## 2023-04-20 PROCEDURE — 99214 OFFICE O/P EST MOD 30 MIN: CPT

## 2023-04-20 RX ORDER — MOMETASONE FUROATE 1 MG/G
0.1 CREAM TOPICAL DAILY
Qty: 1 | Refills: 1 | Status: ACTIVE | COMMUNITY
Start: 2023-04-20 | End: 1900-01-01

## 2023-04-20 RX ORDER — LORATADINE 10 MG/1
10 TABLET ORAL
Qty: 14 | Refills: 0 | Status: ACTIVE | COMMUNITY
Start: 2023-04-20 | End: 1900-01-01

## 2023-04-20 NOTE — PLAN
[FreeTextEntry1] : * loratadine 10 mg as needed\par * mometasone furoate cream\par * advised daily skin moisturizer after shower\par * Dietary counseling given, dietary avoidance discussed, diet and exercise reviewed with patient; patient reminded of importance of aerobic exercise, weight control, dietary compliance and regular glucose monitoring\par * referral to nephrology\par * c/w losartan- HCTZ\par * f/u three months

## 2023-04-20 NOTE — PHYSICAL EXAM
[No Acute Distress] : no acute distress [Well Nourished] : well nourished [Well Developed] : well developed [Well-Appearing] : well-appearing [Normal Sclera/Conjunctiva] : normal sclera/conjunctiva [PERRL] : pupils equal round and reactive to light [EOMI] : extraocular movements intact [Normal Outer Ear/Nose] : the outer ears and nose were normal in appearance [Normal Oropharynx] : the oropharynx was normal [No JVD] : no jugular venous distention [No Lymphadenopathy] : no lymphadenopathy [Supple] : supple [Thyroid Normal, No Nodules] : the thyroid was normal and there were no nodules present [No Respiratory Distress] : no respiratory distress  [No Accessory Muscle Use] : no accessory muscle use [Clear to Auscultation] : lungs were clear to auscultation bilaterally [Normal Rate] : normal rate  [Regular Rhythm] : with a regular rhythm [Normal S1, S2] : normal S1 and S2 [No Murmur] : no murmur heard [No Carotid Bruits] : no carotid bruits [No Abdominal Bruit] : a ~M bruit was not heard ~T in the abdomen [No Varicosities] : no varicosities [Pedal Pulses Present] : the pedal pulses are present [No Edema] : there was no peripheral edema [No Palpable Aorta] : no palpable aorta [No Extremity Clubbing/Cyanosis] : no extremity clubbing/cyanosis [Soft] : abdomen soft [Non Tender] : non-tender [Non-distended] : non-distended [No Masses] : no abdominal mass palpated [No HSM] : no HSM [Normal Bowel Sounds] : normal bowel sounds [Normal Posterior Cervical Nodes] : no posterior cervical lymphadenopathy [Normal Anterior Cervical Nodes] : no anterior cervical lymphadenopathy [No CVA Tenderness] : no CVA  tenderness [No Spinal Tenderness] : no spinal tenderness [No Joint Swelling] : no joint swelling [Grossly Normal Strength/Tone] : grossly normal strength/tone [Coordination Grossly Intact] : coordination grossly intact [No Focal Deficits] : no focal deficits [Normal Gait] : normal gait [Deep Tendon Reflexes (DTR)] : deep tendon reflexes were 2+ and symmetric [Normal Affect] : the affect was normal [Normal Insight/Judgement] : insight and judgment were intact [de-identified] : erythematous fine papular rash on forearms and lower back; dry skin

## 2023-04-20 NOTE — HISTORY OF PRESENT ILLNESS
[FreeTextEntry8] : 87 years old female presents complaining of rash on forearms and lower back for one week , erythematous , papular associated with pruritus, denies any fever, or any new medications, she has taken Benadryl with some relief

## 2023-04-29 ENCOUNTER — NON-APPOINTMENT (OUTPATIENT)
Age: 88
End: 2023-04-29

## 2023-05-06 ENCOUNTER — EMERGENCY (EMERGENCY)
Facility: HOSPITAL | Age: 88
LOS: 1 days | Discharge: HOME | End: 2023-05-06
Attending: INTERNAL MEDICINE | Admitting: INTERNAL MEDICINE
Payer: COMMERCIAL

## 2023-05-06 VITALS
DIASTOLIC BLOOD PRESSURE: 78 MMHG | RESPIRATION RATE: 16 BRPM | SYSTOLIC BLOOD PRESSURE: 128 MMHG | HEIGHT: 59 IN | WEIGHT: 147.93 LBS | OXYGEN SATURATION: 99 % | HEART RATE: 79 BPM | TEMPERATURE: 97 F

## 2023-05-06 DIAGNOSIS — Z98.890 OTHER SPECIFIED POSTPROCEDURAL STATES: Chronic | ICD-10-CM

## 2023-05-06 DIAGNOSIS — Z90.89 ACQUIRED ABSENCE OF OTHER ORGANS: Chronic | ICD-10-CM

## 2023-05-06 DIAGNOSIS — S82.892A OTHER FRACTURE OF LEFT LOWER LEG, INITIAL ENCOUNTER FOR CLOSED FRACTURE: Chronic | ICD-10-CM

## 2023-05-06 PROCEDURE — 99284 EMERGENCY DEPT VISIT MOD MDM: CPT

## 2023-05-06 NOTE — ED ADULT TRIAGE NOTE - CHIEF COMPLAINT QUOTE
Patient presents to ED with complaint of rash to back and hands started 3 weeks ago on and off described as itchy; went to urgent care was prescribed prednisone

## 2023-05-07 VITALS
OXYGEN SATURATION: 100 % | SYSTOLIC BLOOD PRESSURE: 119 MMHG | RESPIRATION RATE: 15 BRPM | TEMPERATURE: 98 F | HEART RATE: 83 BPM | DIASTOLIC BLOOD PRESSURE: 71 MMHG

## 2023-05-07 LAB
ALBUMIN SERPL ELPH-MCNC: 3.5 G/DL — SIGNIFICANT CHANGE UP (ref 3.3–5)
ALP SERPL-CCNC: 96 U/L — SIGNIFICANT CHANGE UP (ref 40–120)
ALT FLD-CCNC: 24 U/L — SIGNIFICANT CHANGE UP (ref 12–78)
ANION GAP SERPL CALC-SCNC: 8 MMOL/L — SIGNIFICANT CHANGE UP (ref 5–17)
AST SERPL-CCNC: 21 U/L — SIGNIFICANT CHANGE UP (ref 15–37)
BILIRUB SERPL-MCNC: 0.3 MG/DL — SIGNIFICANT CHANGE UP (ref 0.2–1.2)
BUN SERPL-MCNC: 16 MG/DL — SIGNIFICANT CHANGE UP (ref 7–23)
CALCIUM SERPL-MCNC: 9.8 MG/DL — SIGNIFICANT CHANGE UP (ref 8.5–10.1)
CHLORIDE SERPL-SCNC: 95 MMOL/L — LOW (ref 96–108)
CO2 SERPL-SCNC: 25 MMOL/L — SIGNIFICANT CHANGE UP (ref 22–31)
CREAT SERPL-MCNC: 0.71 MG/DL — SIGNIFICANT CHANGE UP (ref 0.5–1.3)
EGFR: 83 ML/MIN/1.73M2 — SIGNIFICANT CHANGE UP
GLUCOSE SERPL-MCNC: 109 MG/DL — HIGH (ref 70–99)
HCT VFR BLD CALC: 32.2 % — LOW (ref 34.5–45)
HGB BLD-MCNC: 10.4 G/DL — LOW (ref 11.5–15.5)
MCHC RBC-ENTMCNC: 27.7 PG — SIGNIFICANT CHANGE UP (ref 27–34)
MCHC RBC-ENTMCNC: 32.3 GM/DL — SIGNIFICANT CHANGE UP (ref 32–36)
MCV RBC AUTO: 85.6 FL — SIGNIFICANT CHANGE UP (ref 80–100)
NRBC # BLD: 0 /100 WBCS — SIGNIFICANT CHANGE UP (ref 0–0)
PLATELET # BLD AUTO: 190 K/UL — SIGNIFICANT CHANGE UP (ref 150–400)
POTASSIUM SERPL-MCNC: 4.2 MMOL/L — SIGNIFICANT CHANGE UP (ref 3.5–5.3)
POTASSIUM SERPL-SCNC: 4.2 MMOL/L — SIGNIFICANT CHANGE UP (ref 3.5–5.3)
PROT SERPL-MCNC: 7.6 G/DL — SIGNIFICANT CHANGE UP (ref 6–8.3)
RBC # BLD: 3.76 M/UL — LOW (ref 3.8–5.2)
RBC # FLD: 15.2 % — HIGH (ref 10.3–14.5)
SODIUM SERPL-SCNC: 128 MMOL/L — LOW (ref 135–145)
WBC # BLD: 10.01 K/UL — SIGNIFICANT CHANGE UP (ref 3.8–10.5)
WBC # FLD AUTO: 10.01 K/UL — SIGNIFICANT CHANGE UP (ref 3.8–10.5)

## 2023-05-07 PROCEDURE — 96375 TX/PRO/DX INJ NEW DRUG ADDON: CPT

## 2023-05-07 PROCEDURE — 99284 EMERGENCY DEPT VISIT MOD MDM: CPT | Mod: 25

## 2023-05-07 PROCEDURE — 80053 COMPREHEN METABOLIC PANEL: CPT

## 2023-05-07 PROCEDURE — 85027 COMPLETE CBC AUTOMATED: CPT

## 2023-05-07 PROCEDURE — 96374 THER/PROPH/DIAG INJ IV PUSH: CPT

## 2023-05-07 PROCEDURE — 36415 COLL VENOUS BLD VENIPUNCTURE: CPT

## 2023-05-07 PROCEDURE — 96361 HYDRATE IV INFUSION ADD-ON: CPT

## 2023-05-07 RX ORDER — SODIUM CHLORIDE 9 MG/ML
1000 INJECTION INTRAMUSCULAR; INTRAVENOUS; SUBCUTANEOUS ONCE
Refills: 0 | Status: COMPLETED | OUTPATIENT
Start: 2023-05-07 | End: 2023-05-07

## 2023-05-07 RX ORDER — DIPHENHYDRAMINE HCL 50 MG
25 CAPSULE ORAL ONCE
Refills: 0 | Status: COMPLETED | OUTPATIENT
Start: 2023-05-07 | End: 2023-05-07

## 2023-05-07 RX ORDER — HYDROXYZINE HCL 10 MG
1 TABLET ORAL
Qty: 15 | Refills: 0
Start: 2023-05-07 | End: 2023-05-11

## 2023-05-07 RX ORDER — FAMOTIDINE 10 MG/ML
20 INJECTION INTRAVENOUS ONCE
Refills: 0 | Status: COMPLETED | OUTPATIENT
Start: 2023-05-07 | End: 2023-05-07

## 2023-05-07 RX ADMIN — SODIUM CHLORIDE 1000 MILLILITER(S): 9 INJECTION INTRAMUSCULAR; INTRAVENOUS; SUBCUTANEOUS at 02:40

## 2023-05-07 RX ADMIN — FAMOTIDINE 20 MILLIGRAM(S): 10 INJECTION INTRAVENOUS at 01:24

## 2023-05-07 RX ADMIN — Medication 25 MILLIGRAM(S): at 01:24

## 2023-05-07 RX ADMIN — Medication 125 MILLIGRAM(S): at 01:24

## 2023-05-07 NOTE — ED PROVIDER NOTE - NSFOLLOWUPCLINICS_GEN_ALL_ED_FT
[facility]  Allergy  [address] [place] [address]  Phone: [phone]  Fax:

## 2023-05-07 NOTE — ED PROVIDER NOTE - PATIENT PORTAL LINK FT
You can access the FollowMyHealth Patient Portal offered by [facility] by registering at the following website: [email or web address]. By joining [facility]’s FollowMyHealth portal, you will also be able to view your health information using other applications (apps) compatible with our system.

## 2023-05-07 NOTE — ED PROVIDER NOTE - SIGNIFICANT NEGATIVE FINDINGS
no headache, no chest pain, no SOB, no palpitations, no fever, no chills, no n/v, no urinary symptoms,  no neuro changes.

## 2023-05-07 NOTE — ED PROVIDER NOTE - CLINICAL SUMMARY MEDICAL DECISION MAKING FREE TEXT BOX
rash and pruritis, treated with benadryl, steroids and Pepcid, hyponatremia was treated with NSS, stable at discharge with O/P referral given

## 2023-05-07 NOTE — ED ADULT NURSE REASSESSMENT NOTE - NS ED NURSE REASSESS COMMENT FT1
s/w Dr Love pt c/o lower back pain radiating down her leg.  has hx of sciatica  medicated with percocet as ordered.  daughter at bedside.  bed locked and in lowest position. call bell within reach

## 2023-05-07 NOTE — ED PROVIDER NOTE - OBJECTIVE STATEMENT
88 y/o female h/o chronic sciatica hypertension  low back pain   C/C Patient presents to ED with complaint of rash to back and hands started 3 weeks ago on and off described as itchy; went to urgent care was prescribed prednisone.   no headache, no chest pain, no SOB, no palpitations, no fever, no chills, no n/v, no urinary symptoms,  no neuro changes.

## 2023-05-16 ENCOUNTER — INPATIENT (INPATIENT)
Facility: HOSPITAL | Age: 88
LOS: 3 days | Discharge: HOME | DRG: 543 | End: 2023-05-20
Attending: INTERNAL MEDICINE | Admitting: INTERNAL MEDICINE
Payer: COMMERCIAL

## 2023-05-16 VITALS
OXYGEN SATURATION: 93 % | HEART RATE: 94 BPM | SYSTOLIC BLOOD PRESSURE: 160 MMHG | DIASTOLIC BLOOD PRESSURE: 77 MMHG | HEIGHT: 59 IN | TEMPERATURE: 98 F | WEIGHT: 154.98 LBS | RESPIRATION RATE: 18 BRPM

## 2023-05-16 DIAGNOSIS — Z90.89 ACQUIRED ABSENCE OF OTHER ORGANS: Chronic | ICD-10-CM

## 2023-05-16 DIAGNOSIS — S32.000A WEDGE COMPRESSION FRACTURE OF UNSPECIFIED LUMBAR VERTEBRA, INITIAL ENCOUNTER FOR CLOSED FRACTURE: ICD-10-CM

## 2023-05-16 DIAGNOSIS — Z98.890 OTHER SPECIFIED POSTPROCEDURAL STATES: Chronic | ICD-10-CM

## 2023-05-16 DIAGNOSIS — M89.9 DISORDER OF BONE, UNSPECIFIED: ICD-10-CM

## 2023-05-16 DIAGNOSIS — S32.009A UNSPECIFIED FRACTURE OF UNSPECIFIED LUMBAR VERTEBRA, INITIAL ENCOUNTER FOR CLOSED FRACTURE: ICD-10-CM

## 2023-05-16 DIAGNOSIS — S82.892A OTHER FRACTURE OF LEFT LOWER LEG, INITIAL ENCOUNTER FOR CLOSED FRACTURE: Chronic | ICD-10-CM

## 2023-05-16 DIAGNOSIS — I10 ESSENTIAL (PRIMARY) HYPERTENSION: ICD-10-CM

## 2023-05-16 LAB
ALBUMIN SERPL ELPH-MCNC: 3.5 G/DL — SIGNIFICANT CHANGE UP (ref 3.3–5)
ALP SERPL-CCNC: 107 U/L — SIGNIFICANT CHANGE UP (ref 40–120)
ALT FLD-CCNC: 22 U/L — SIGNIFICANT CHANGE UP (ref 12–78)
ANION GAP SERPL CALC-SCNC: 7 MMOL/L — SIGNIFICANT CHANGE UP (ref 5–17)
AST SERPL-CCNC: 22 U/L — SIGNIFICANT CHANGE UP (ref 15–37)
BASOPHILS # BLD AUTO: 0.16 K/UL — SIGNIFICANT CHANGE UP (ref 0–0.2)
BASOPHILS NFR BLD AUTO: 2 % — SIGNIFICANT CHANGE UP (ref 0–2)
BILIRUB SERPL-MCNC: 0.4 MG/DL — SIGNIFICANT CHANGE UP (ref 0.2–1.2)
BUN SERPL-MCNC: 7 MG/DL — SIGNIFICANT CHANGE UP (ref 7–23)
CALCIUM SERPL-MCNC: 10.3 MG/DL — HIGH (ref 8.5–10.1)
CHLORIDE SERPL-SCNC: 98 MMOL/L — SIGNIFICANT CHANGE UP (ref 96–108)
CO2 SERPL-SCNC: 25 MMOL/L — SIGNIFICANT CHANGE UP (ref 22–31)
CREAT SERPL-MCNC: 0.55 MG/DL — SIGNIFICANT CHANGE UP (ref 0.5–1.3)
EGFR: 89 ML/MIN/1.73M2 — SIGNIFICANT CHANGE UP
EOSINOPHIL # BLD AUTO: 0.16 K/UL — SIGNIFICANT CHANGE UP (ref 0–0.5)
EOSINOPHIL NFR BLD AUTO: 2 % — SIGNIFICANT CHANGE UP (ref 0–6)
GLUCOSE SERPL-MCNC: 108 MG/DL — HIGH (ref 70–99)
HCT VFR BLD CALC: 31.5 % — LOW (ref 34.5–45)
HGB BLD-MCNC: 10.5 G/DL — LOW (ref 11.5–15.5)
LIDOCAIN IGE QN: 88 U/L — SIGNIFICANT CHANGE UP (ref 73–393)
LYMPHOCYTES # BLD AUTO: 2.18 K/UL — SIGNIFICANT CHANGE UP (ref 1–3.3)
LYMPHOCYTES # BLD AUTO: 28 % — SIGNIFICANT CHANGE UP (ref 13–44)
MCHC RBC-ENTMCNC: 28.4 PG — SIGNIFICANT CHANGE UP (ref 27–34)
MCHC RBC-ENTMCNC: 33.3 GM/DL — SIGNIFICANT CHANGE UP (ref 32–36)
MCV RBC AUTO: 85.1 FL — SIGNIFICANT CHANGE UP (ref 80–100)
MONOCYTES # BLD AUTO: 0.93 K/UL — HIGH (ref 0–0.9)
MONOCYTES NFR BLD AUTO: 12 % — SIGNIFICANT CHANGE UP (ref 2–14)
NEUTROPHILS # BLD AUTO: 3.97 K/UL — SIGNIFICANT CHANGE UP (ref 1.8–7.4)
NEUTROPHILS NFR BLD AUTO: 51 % — SIGNIFICANT CHANGE UP (ref 43–77)
NRBC # BLD: SIGNIFICANT CHANGE UP /100 WBCS (ref 0–0)
PLATELET # BLD AUTO: 143 K/UL — LOW (ref 150–400)
POTASSIUM SERPL-MCNC: 3.8 MMOL/L — SIGNIFICANT CHANGE UP (ref 3.5–5.3)
POTASSIUM SERPL-SCNC: 3.8 MMOL/L — SIGNIFICANT CHANGE UP (ref 3.5–5.3)
PROT SERPL-MCNC: 8.1 G/DL — SIGNIFICANT CHANGE UP (ref 6–8.3)
RBC # BLD: 3.7 M/UL — LOW (ref 3.8–5.2)
RBC # FLD: 15.1 % — HIGH (ref 10.3–14.5)
SODIUM SERPL-SCNC: 130 MMOL/L — LOW (ref 135–145)
TROPONIN I, HIGH SENSITIVITY RESULT: 9.6 NG/L — SIGNIFICANT CHANGE UP
WBC # BLD: 7.78 K/UL — SIGNIFICANT CHANGE UP (ref 3.8–10.5)
WBC # FLD AUTO: 7.78 K/UL — SIGNIFICANT CHANGE UP (ref 3.8–10.5)

## 2023-05-16 PROCEDURE — 74174 CTA ABD&PLVS W/CONTRAST: CPT | Mod: 26,MA

## 2023-05-16 PROCEDURE — 71275 CT ANGIOGRAPHY CHEST: CPT | Mod: 26,MA

## 2023-05-16 PROCEDURE — 99285 EMERGENCY DEPT VISIT HI MDM: CPT

## 2023-05-16 PROCEDURE — 72131 CT LUMBAR SPINE W/O DYE: CPT | Mod: 26,MA

## 2023-05-16 RX ORDER — CYCLOBENZAPRINE HYDROCHLORIDE 10 MG/1
5 TABLET, FILM COATED ORAL THREE TIMES A DAY
Refills: 0 | Status: DISCONTINUED | OUTPATIENT
Start: 2023-05-16 | End: 2023-05-18

## 2023-05-16 RX ORDER — OXYCODONE HYDROCHLORIDE 5 MG/1
5 TABLET ORAL EVERY 4 HOURS
Refills: 0 | Status: DISCONTINUED | OUTPATIENT
Start: 2023-05-16 | End: 2023-05-17

## 2023-05-16 RX ORDER — LIDOCAINE 4 G/100G
1 CREAM TOPICAL ONCE
Refills: 0 | Status: COMPLETED | OUTPATIENT
Start: 2023-05-16 | End: 2023-05-16

## 2023-05-16 RX ORDER — SENNA PLUS 8.6 MG/1
2 TABLET ORAL AT BEDTIME
Refills: 0 | Status: DISCONTINUED | OUTPATIENT
Start: 2023-05-16 | End: 2023-05-20

## 2023-05-16 RX ORDER — LANOLIN ALCOHOL/MO/W.PET/CERES
3 CREAM (GRAM) TOPICAL AT BEDTIME
Refills: 0 | Status: DISCONTINUED | OUTPATIENT
Start: 2023-05-16 | End: 2023-05-20

## 2023-05-16 RX ORDER — ACETAMINOPHEN 500 MG
650 TABLET ORAL EVERY 6 HOURS
Refills: 0 | Status: DISCONTINUED | OUTPATIENT
Start: 2023-05-16 | End: 2023-05-20

## 2023-05-16 RX ORDER — CYCLOBENZAPRINE HYDROCHLORIDE 10 MG/1
5 TABLET, FILM COATED ORAL ONCE
Refills: 0 | Status: COMPLETED | OUTPATIENT
Start: 2023-05-16 | End: 2023-05-16

## 2023-05-16 RX ORDER — METOPROLOL TARTRATE 50 MG
25 TABLET ORAL EVERY 12 HOURS
Refills: 0 | Status: DISCONTINUED | OUTPATIENT
Start: 2023-05-16 | End: 2023-05-18

## 2023-05-16 RX ORDER — ONDANSETRON 8 MG/1
4 TABLET, FILM COATED ORAL EVERY 6 HOURS
Refills: 0 | Status: DISCONTINUED | OUTPATIENT
Start: 2023-05-16 | End: 2023-05-20

## 2023-05-16 RX ORDER — TRAMADOL HYDROCHLORIDE 50 MG/1
50 TABLET ORAL EVERY 6 HOURS
Refills: 0 | Status: DISCONTINUED | OUTPATIENT
Start: 2023-05-16 | End: 2023-05-17

## 2023-05-16 RX ADMIN — CYCLOBENZAPRINE HYDROCHLORIDE 5 MILLIGRAM(S): 10 TABLET, FILM COATED ORAL at 23:11

## 2023-05-16 RX ADMIN — Medication 25 MILLIGRAM(S): at 23:11

## 2023-05-16 RX ADMIN — CYCLOBENZAPRINE HYDROCHLORIDE 5 MILLIGRAM(S): 10 TABLET, FILM COATED ORAL at 14:06

## 2023-05-16 RX ADMIN — LIDOCAINE 1 PATCH: 4 CREAM TOPICAL at 14:06

## 2023-05-16 RX ADMIN — LIDOCAINE 1 PATCH: 4 CREAM TOPICAL at 19:00

## 2023-05-16 RX ADMIN — LIDOCAINE 1 PATCH: 4 CREAM TOPICAL at 14:41

## 2023-05-16 NOTE — ED PROVIDER NOTE - CLINICAL SUMMARY MEDICAL DECISION MAKING FREE TEXT BOX
88 y/o female h/o chronic sciatica, hypertension, chronic low back pain presents to the ED with c/o lower back pain chronically but worsened 3 to 4 days ago.  Patient reports when she moves she feels a spasm in her back sometimes a spasm is so bad it radiates to the right of her abdomen.  She denies any fall or trauma, nausea vomiting diarrhea, urinary symptoms, fever chills or all other complaints. PE as noted above. pain control,  CT pending, reassess 86 y/o female h/o chronic sciatica, hypertension, chronic low back pain presents to the ED with c/o lower back pain chronically but worsened 3 to 4 days ago.  Patient reports when she moves she feels a spasm in her back sometimes the spasm is so bad it radiates to the front of her abdomen.  She denies any fall or trauma, nausea vomiting diarrhea, urinary symptoms, fever chills or all other complaints.  PE as noted above. pain control,  CT pending, reassess 86 y/o female h/o chronic sciatica, hypertension, chronic low back pain presents to the ED with c/o lower back pain chronically but worsened 3 to 4 days ago.  Patient reports when she moves she feels a spasm in her back sometimes the spasm is so bad it radiates to the front of her abdomen.  She denies any fall or trauma, nausea vomiting diarrhea, urinary symptoms, fever chills or all other complaints.  PE as noted above. pain control,  CT pending, reassess  ed md:  Patient is an 87-year-old female with a history of HLD, DM, chronic sciatica and low back pain with radiculopathy.  She presents to the emergency room today with severe low back pain radiating down her legs.  In addition to this pain that she had for 4 days she also has epigastric pain chest pain nausea and dyspnea on exertion.  She has no prior history of cardiac issues.  Not a smoker or drinker.  Denies trauma.  States has been urinating more frequently.  But has no loss of bowel control.    Discussion with the PA who primarily saw the patient the patient was incontinent of urine, but had normal rectal tone and normal motor strength.  Patient is a well-developed well-nourished female no apparent distress when laying still.  But on turning moving sitting up.  Patient grimaces secondary to pain in her low back.  HEENT is unremarkable.  No G/F/R.  No pallor.  No cyanosis.  Neck is supple.  Mucous membranes moist.  Cardiopulmonary exam is unremarkable.  Abdominal exam patient has trocar scars from prior abdominal surgery.  And some mild nonspecific abdominal pain.  On examination of her spine.  Patient has diffuse upper lumbar pain across her back.  She has straight leg raise in both legs.  But the legs are neurologic and vascular intact.  The perineum was not examined as the exam was deferred to the PA exam prior.  Neurologic exam patient has pain with straight leg raise.  No other focal neurological findings.  Skin exam is unremarkable.  There is no rash.    Plan of care includes pain control due to the sciatica.  Given her onset of chest pain epigastric pain with nausea and shortness of breath exclude ACS.  Given the back pain that radiates to her abdomen must exclude aortic dissection will CT image the chest abdomen and pelvis with IV contrast.  Obtain laboratory studies of troponin, lipase, CBC and electrolytes.  Renal function test.  Neuro urinalysis.  EKG.  Parenteral pain control.  And disposition accordingly.  This chart was made with dictation software and may contain typographical errors.

## 2023-05-16 NOTE — H&P ADULT - HISTORY OF PRESENT ILLNESS
This is a 88 y/o female h/o chronic sciatica, hypertension, chronic low back pain presents to the ED with c/o lower back pain chronically but worsened 3 to 4 days ago.  Patient reports when she moves she feels a spasm in her back sometimes the spasm is so bad it radiates to the front of her abdomen.  She denies any fall or trauma, nausea vomiting diarrhea, urinary symptoms, fever chills or all other complaints. In the ER, CT shows multiple compression fractures and ?L rib lesion.

## 2023-05-16 NOTE — ED ADULT NURSE REASSESSMENT NOTE - NS ED NURSE REASSESS COMMENT FT1
pt also complaining of stomach pain since wednesday with nausea. no vomiting or diarrhea. pt reports its mild pain. family member requesting a CT of stomach. endorsed to ABDIRASHID Sanford

## 2023-05-16 NOTE — H&P ADULT - PROBLEM SELECTOR PLAN 1
Admit  Pain meds prn  Ortho consult  PT once cleared by ortho  Further work-up/management pending clinical course.

## 2023-05-16 NOTE — CONSULT NOTE ADULT - SUBJECTIVE AND OBJECTIVE BOX
Pt Name: BASSEM DOMINGUEZ    MRN: [id number]      Patient is a 87y Female presenting to the emergency department with a chief complaint of low back pain.  Patient has a hx of chronic sciatica htn. She presents today with an acute exacerbation of low back pain. She notes that over the last few days the low back pain as worsened. She notes that sometimes it radiates to the abdomen. Pain is worsened with bending, twisting and lifting. Pain is somewhat made worse with coughing. She localizes the pain to the lower and mid back. She denies any trauma in the last few days. She denies any radicular symptoms to the lower extremities. She denies any loss of bowel or bladder control. denies cp/sob/n/v/. She has poor english proficiency and is present with her daughter.   .    HEALTH ISSUES - PROBLEM Dx:  Closed compression fracture of lumbosacral spine    Rib lesion    Essential hypertension        .      REVIEW OF SYSTEMS      General:	    Skin/Breast:  	  Ophthalmologic:  	  ENMT:	    Respiratory and Thorax:  	  Cardiovascular:	    Gastrointestinal:	    Genitourinary:	    Musculoskeletal:	    Neurological:	    Psychiatric:	    Hematology/Lymphatics:	    Endocrine:	    Allergic/Immunologic:	    ROS is otherwise negative.    PAST MEDICAL & SURGICAL HISTORY:  PAST MEDICAL & SURGICAL HISTORY:  Hypertension      GERD (gastroesophageal reflux disease)      Ankle fracture, left      Gastro-esophageal reflux disease without esophagitis      Benign essential hypertension      Chronic cough      Chronic low back pain      Dyspnea on exertion      Back pain with sciatica  Sciatica affects LEFT Side      Regurgitation of food      Ankle fracture, left  1980 (Hardware placed)      H/O endoscopy      H/O colonoscopy      History of tonsillectomy  As a child          Allergies: No Known Allergies      Medications:     FAMILY HISTORY:  : non-contributory    Social History:     Ambulation: Walking independently [ ] With Cane [X ] With Walker [ X]  Bedbound [ ]                           10.5   7.78  )-----------( 143      ( 16 May 2023 14:30 )             31.5     05-16    130<L>  |  98  |  7   ----------------------------<  108<H>  3.8   |  25  |  0.55    Ca    10.3<H>      16 May 2023 14:30    TPro  8.1  /  Alb  3.5  /  TBili  0.4  /  DBili  x   /  AST  22  /  ALT  22  /  AlkPhos  107  05-16      PHYSICAL EXAM:    Vital Signs Last 24 Hrs  T(C): 36.9 (16 May 2023 12:22), Max: 36.9 (16 May 2023 12:22)  T(F): 98.4 (16 May 2023 12:22), Max: 98.4 (16 May 2023 12:22)  HR: 94 (16 May 2023 12:22) (94 - 94)  BP: 160/77 (16 May 2023 12:22) (160/77 - 160/77)  BP(mean): --  RR: 18 (16 May 2023 12:22) (18 - 18)  SpO2: 93% (16 May 2023 12:22) (93% - 93%)    Parameters below as of 16 May 2023 12:22  Patient On (Oxygen Delivery Method): room air      Daily Height in cm: 149.86 (16 May 2023 12:22)    Daily     Appearance: Alert, responsive, in no acute distress.    Skin: no rash on visible skin. Skin is clean, dry and intact. No bleeding. No abrasions. No ulcerations.    Vascular: 2+ distal pulses. Cap refill < 2 sec. No signs of venous insuffiency or stasis. No extremity ulcerations. No cyanosis.    Musculoskeletal:      examination of the lumbar spine reveals diffuse paraspinal tenderness over the lumbar region as well as moderate tenderness over the thoracic region. ROM not assessed due to pt lying in bed.   pt able to SLR bilaterally,           Left Lower Extremity: Atraumatic with normal alignment NROM. No crepitus. No bony tenderness.        Right Lower Extremity: Atraumatic with normal alignment NROM. No crepitus. No bony tenderness.     Neurological: Sensation is grossly intact to light touch. No focal deficits or weaknesses found.        Motor exam: [  ]                [ ] Lower extremeity          HF(l2)   KE(l3)    TA(l4)   EHL(l5)  GS(s1)                                                 R        5/5        5/5        5/5       5/5         5/5                                               L         5/5        5/5       5/5       5/5          5/5    Imaging Studies:    CT of the lumbar spine shows:   * Severe compression deformity of the L1 vertebra which has occurred in the interim from prior abdominal CT of 2/3/2023. Mild retropulsion with mild canal narrowing.  * Moderate to severe compression deformity inferior greater than superior endplates of L4 greatest centrally. This also has occurred in the interim from prior abdominal CT. Mild retropulsion towards the inferior endplate with mild canal narrowing.  * Mild disc bulges L3-4 and L4-5 with facet degenerative changes result in moderate canal and mild bilateral neural foramina narrowing.  * Severe facet and ligamentous degenerative changes L5-S1 results in grade 1 spondylolisthesis, severe canal and severe left greater than right neural foramina narrowing    CT of the chest shows:   Left lateral sixth rib 1.5 x 1.3 cm lesion, with associated pathologic fracture, image 67 series 3. Recommend bone scan for further evaluation. Correlate for any history of primary malignancy.  Height loss of T2 as well as T11    A/P:  Pt is a  87y Female with Patient is a 87y old  Female who presents with a chief complaint of back pain (16 May 2023 19:15)   found to have compression Fx of L1, L4, as well as T2 and T11      PLAN:  * Pain control  * MRI of C-spine, T-spine and L-spine to determine any further pathology  * Treatment plan to be finalized after review of pending imaging studies  *LSO to be ordered  *admitted to medicine  *Discussed with Dr. Menezes who agrees with plan.    Pt Name: BASSEM DOMINGUEZ    MRN: [id number]      Patient is a 87y Female presenting to the emergency department with a chief complaint of low back pain.  Patient has a hx of chronic sciatica htn. She presents today with an acute exacerbation of low back pain. She notes that over the last few days the low back pain as worsened. She notes that sometimes it radiates to the abdomen. Pain is worsened with bending, twisting and lifting. Pain is somewhat made worse with coughing. She localizes the pain to the lower and mid back. She denies any trauma in the last few days. She denies any radicular symptoms to the lower extremities. She denies any loss of bowel or bladder control. denies cp/sob/n/v/. She has poor english proficiency and is present with her daughter.   .    HEALTH ISSUES - PROBLEM Dx:  Closed compression fracture of lumbosacral spine    Rib lesion    Essential hypertension        .      REVIEW OF SYSTEMS      General:	    Skin/Breast:  	  Ophthalmologic:  	  ENMT:	    Respiratory and Thorax:  	  Cardiovascular:	    Gastrointestinal:	    Genitourinary:	    Musculoskeletal:	    Neurological:	    Psychiatric:	    Hematology/Lymphatics:	    Endocrine:	    Allergic/Immunologic:	    ROS is otherwise negative.    PAST MEDICAL & SURGICAL HISTORY:  PAST MEDICAL & SURGICAL HISTORY:  Hypertension      GERD (gastroesophageal reflux disease)      Ankle fracture, left      Gastro-esophageal reflux disease without esophagitis      Benign essential hypertension      Chronic cough      Chronic low back pain      Dyspnea on exertion      Back pain with sciatica  Sciatica affects LEFT Side      Regurgitation of food      Ankle fracture, left  1980 (Hardware placed)      H/O endoscopy      H/O colonoscopy      History of tonsillectomy  As a child          Allergies: No Known Allergies      Medications:     FAMILY HISTORY:  : non-contributory    Social History:     Ambulation: Walking independently [ ] With Cane [X ] With Walker [ X]  Bedbound [ ]                           10.5   7.78  )-----------( 143      ( 16 May 2023 14:30 )             31.5     05-16    130<L>  |  98  |  7   ----------------------------<  108<H>  3.8   |  25  |  0.55    Ca    10.3<H>      16 May 2023 14:30    TPro  8.1  /  Alb  3.5  /  TBili  0.4  /  DBili  x   /  AST  22  /  ALT  22  /  AlkPhos  107  05-16      PHYSICAL EXAM:    Vital Signs Last 24 Hrs  T(C): 36.9 (16 May 2023 12:22), Max: 36.9 (16 May 2023 12:22)  T(F): 98.4 (16 May 2023 12:22), Max: 98.4 (16 May 2023 12:22)  HR: 94 (16 May 2023 12:22) (94 - 94)  BP: 160/77 (16 May 2023 12:22) (160/77 - 160/77)  BP(mean): --  RR: 18 (16 May 2023 12:22) (18 - 18)  SpO2: 93% (16 May 2023 12:22) (93% - 93%)    Parameters below as of 16 May 2023 12:22  Patient On (Oxygen Delivery Method): room air      Daily Height in cm: 149.86 (16 May 2023 12:22)    Daily     Appearance: Alert, responsive, in no acute distress.    Skin: no rash on visible skin. Skin is clean, dry and intact. No bleeding. No abrasions. No ulcerations.    Vascular: 2+ distal pulses. Cap refill < 2 sec. No signs of venous insuffiency or stasis. No extremity ulcerations. No cyanosis.    Musculoskeletal:      examination of the lumbar spine reveals diffuse paraspinal tenderness over the lumbar region as well as moderate tenderness over the thoracic region. ROM not assessed due to pt lying in bed.   pt able to SLR bilaterally,           Left Lower Extremity: Atraumatic with normal alignment NROM. No crepitus. No bony tenderness.        Right Lower Extremity: Atraumatic with normal alignment NROM. No crepitus. No bony tenderness.     Neurological: Sensation is grossly intact to light touch. No focal deficits or weaknesses found.        Motor exam: [  ]                [ ] Lower extremeity          HF(l2)   KE(l3)    TA(l4)   EHL(l5)  GS(s1)                                                 R        5/5        5/5        5/5       5/5         5/5                                               L         5/5        5/5       5/5       5/5          5/5    Imaging Studies:    CT of the lumbar spine shows:   * Severe compression deformity of the L1 vertebra which has occurred in the interim from prior abdominal CT of 2/3/2023. Mild retropulsion with mild canal narrowing.  * Moderate to severe compression deformity inferior greater than superior endplates of L4 greatest centrally. This also has occurred in the interim from prior abdominal CT. Mild retropulsion towards the inferior endplate with mild canal narrowing.  * Mild disc bulges L3-4 and L4-5 with facet degenerative changes result in moderate canal and mild bilateral neural foramina narrowing.  * Severe facet and ligamentous degenerative changes L5-S1 results in grade 1 spondylolisthesis, severe canal and severe left greater than right neural foramina narrowing    CT of the chest shows:   Left lateral sixth rib 1.5 x 1.3 cm lesion, with associated pathologic fracture, image 67 series 3. Recommend bone scan for further evaluation. Correlate for any history of primary malignancy.  Height loss of T2 as well as T11    A/P:  Pt is a  87y Female with Patient is a 87y old  Female who presents with a chief complaint of back pain (16 May 2023 19:15)   found to have compression Fx of L1, L4, as well as T2 and T11      PLAN:  * Pain control  * MRI of C-spine, T-spine and L-spine to determine any further pathology  * Treatment plan to be finalized after review of pending imaging studies  *LSO to be ordered  *admitted to medicine  *Discussed with Dr. Menezes who agrees with plan.    Pt Name: BASSEM DOMINGUEZ    MRN: [id number]      Patient is a 87y Female presenting to the emergency department with a chief complaint of low back pain.  Patient has a hx of chronic sciatica htn. She presents today with an acute exacerbation of low back pain. She notes that over the last few days the low back pain as worsened. She notes that sometimes it radiates to the abdomen. Pain is worsened with bending, twisting and lifting. Pain is somewhat made worse with coughing. She localizes the pain to the lower and mid back. She denies any trauma in the last few days. She denies any radicular symptoms to the lower extremities. She denies any loss of bowel or bladder control. denies cp/sob/n/v/. She has poor english proficiency and is present with her daughter.   .    HEALTH ISSUES - PROBLEM Dx:  Closed compression fracture of lumbosacral spine    Rib lesion    Essential hypertension        .      REVIEW OF SYSTEMS      General:	    Skin/Breast:  	  Ophthalmologic:  	  ENMT:	    Respiratory and Thorax:  	  Cardiovascular:	    Gastrointestinal:	    Genitourinary:	    Musculoskeletal:	    Neurological:	    Psychiatric:	    Hematology/Lymphatics:	    Endocrine:	    Allergic/Immunologic:	    ROS is otherwise negative.    PAST MEDICAL & SURGICAL HISTORY:  PAST MEDICAL & SURGICAL HISTORY:  Hypertension      GERD (gastroesophageal reflux disease)      Ankle fracture, left      Gastro-esophageal reflux disease without esophagitis      Benign essential hypertension      Chronic cough      Chronic low back pain      Dyspnea on exertion      Back pain with sciatica  Sciatica affects LEFT Side      Regurgitation of food      Ankle fracture, left  1980 (Hardware placed)      H/O endoscopy      H/O colonoscopy      History of tonsillectomy  As a child          Allergies: No Known Allergies      Medications:     FAMILY HISTORY:  : non-contributory    Social History:     Ambulation: Walking independently [ ] With Cane [X ] With Walker [ X]  Bedbound [ ]                           10.5   7.78  )-----------( 143      ( 16 May 2023 14:30 )             31.5     05-16    130<L>  |  98  |  7   ----------------------------<  108<H>  3.8   |  25  |  0.55    Ca    10.3<H>      16 May 2023 14:30    TPro  8.1  /  Alb  3.5  /  TBili  0.4  /  DBili  x   /  AST  22  /  ALT  22  /  AlkPhos  107  05-16      PHYSICAL EXAM:    Vital Signs Last 24 Hrs  T(C): 36.9 (16 May 2023 12:22), Max: 36.9 (16 May 2023 12:22)  T(F): 98.4 (16 May 2023 12:22), Max: 98.4 (16 May 2023 12:22)  HR: 94 (16 May 2023 12:22) (94 - 94)  BP: 160/77 (16 May 2023 12:22) (160/77 - 160/77)  BP(mean): --  RR: 18 (16 May 2023 12:22) (18 - 18)  SpO2: 93% (16 May 2023 12:22) (93% - 93%)    Parameters below as of 16 May 2023 12:22  Patient On (Oxygen Delivery Method): room air      Daily Height in cm: 149.86 (16 May 2023 12:22)    Daily     Appearance: Alert, responsive, in no acute distress.    Skin: no rash on visible skin. Skin is clean, dry and intact. No bleeding. No abrasions. No ulcerations.    Vascular: 2+ distal pulses. Cap refill < 2 sec. No signs of venous insuffiency or stasis. No extremity ulcerations. No cyanosis.    Musculoskeletal:      examination of the lumbar spine reveals diffuse paraspinal tenderness over the lumbar region as well as moderate tenderness over the thoracic region. ROM not assessed due to pt lying in bed.   pt able to SLR bilaterally,           Left Lower Extremity: Atraumatic with normal alignment NROM. No crepitus. No bony tenderness.        Right Lower Extremity: Atraumatic with normal alignment NROM. No crepitus. No bony tenderness.     Neurological: Sensation is grossly intact to light touch. No focal deficits or weaknesses found.        Motor exam: [  ]                [ ] Lower extremeity          HF(l2)   KE(l3)    TA(l4)   EHL(l5)  GS(s1)                                                 R        5/5        5/5        5/5       5/5         5/5                                               L         5/5        5/5       5/5       5/5          5/5    Imaging Studies:    CT of the lumbar spine shows:   * Severe compression deformity of the L1 vertebra which has occurred in the interim from prior abdominal CT of 2/3/2023. Mild retropulsion with mild canal narrowing.  * Moderate to severe compression deformity inferior greater than superior endplates of L4 greatest centrally. This also has occurred in the interim from prior abdominal CT. Mild retropulsion towards the inferior endplate with mild canal narrowing.  * Mild disc bulges L3-4 and L4-5 with facet degenerative changes result in moderate canal and mild bilateral neural foramina narrowing.  * Severe facet and ligamentous degenerative changes L5-S1 results in grade 1 spondylolisthesis, severe canal and severe left greater than right neural foramina narrowing    CT of the chest shows:   Left lateral sixth rib 1.5 x 1.3 cm lesion, with associated pathologic fracture, image 67 series 3. Recommend bone scan for further evaluation. Correlate for any history of primary malignancy.  Height loss of T2 as well as T11    A/P:  Pt is a  87y Female with Patient is a 87y old  Female who presents with a chief complaint of back pain (16 May 2023 19:15)   found to have compression Fx of L1, L4, as well as T2 and T11      PLAN:  * Pain control  * MRI of C-spine, T-spine and L-spine to determine any further pathology  * Treatment plan to be finalized after review of pending imaging studies  *LSO to be ordered  *admitted to medicine  *Discussed with Dr. Menezes who agrees with plan.

## 2023-05-16 NOTE — ED PROVIDER NOTE - PROGRESS NOTE DETAILS
ABDIRASHID Santos: Pt also now c/o epigastric abd pain x 3 days.  Will check labs and do CTA chest/abd/pelvis. ABDIRASHID Santos: ABDIRASHID Santos:Labs reviewed, CAT scan results reviewed.  Patient has multiple fractures that could be pathological.  She also has lesions on the bones and thyroid nodules.  Per daughter patient has no history of cancers.  She had a fall a month ago but did not have any pain or symptoms at that time.  Spoke with Ortho ABDIRASHID Emerson, they will follow patient on admission.  Spoke with Dr. DIAMANTE Norris, will admit for pain control and malignancy work-up

## 2023-05-16 NOTE — ED PROVIDER NOTE - CARE PLAN
1 Principal Discharge DX:	Lumbar vertebral fracture  Secondary Diagnosis:	Thoracic vertebral fracture  Secondary Diagnosis:	Fracture of one rib

## 2023-05-16 NOTE — ED PROVIDER NOTE - OBJECTIVE STATEMENT
pts daughter translated per patients request  86 y/o female h/o chronic sciatica, hypertension, chronic low back pain presents to the ED with c/o lower back pain chronically but worsened 3 to 4 days ago.  Patient reports when she moves she feels a spasm in her back sometimes a spasm is so bad it radiates to the right of her abdomen.  She denies any fall or trauma, nausea vomiting diarrhea, urinary symptoms, fever chills or all other complaints pts daughter translated per patients request  88 y/o female h/o chronic sciatica, hypertension, chronic low back pain presents to the ED with c/o lower back pain chronically but worsened 3 to 4 days ago.  Patient reports when she moves she feels a spasm in her back sometimes the spasm is so bad it radiates to the front of her abdomen.  She denies any fall or trauma, nausea vomiting diarrhea, urinary symptoms, fever chills or all other complaints

## 2023-05-16 NOTE — ED ADULT NURSE NOTE - OBJECTIVE STATEMENT
pt presents with chronic back pain worsening 3-4 days located lower to mid back. worse with activity. daughter also states that patient is having stomach pain since wednesday/ no vomiting or diarrhea. pt states pain is mild. requesting CT of stomach, endorsed to ABDIRASHID nixon.

## 2023-05-16 NOTE — ED ADULT TRIAGE NOTE - CHIEF COMPLAINT QUOTE
c/o generalized back pain x1 week worsening yesterday. denies fevers, urinary symptoms, falls, trauma, leg pain, abd pain. pt walking with walker at this time. denies pmhx.

## 2023-05-16 NOTE — ED ADULT NURSE NOTE - NSFALLRISKINTERV_ED_ALL_ED
Assistance OOB with selected safe patient handling equipment if applicable/Assistance with ambulation/Communicate fall risk and risk factors to all staff, patient, and family/Monitor gait and stability/Provide visual cue: yellow wristband, yellow gown, etc/Reinforce activity limits and safety measures with patient and family/Call bell, personal items and telephone in reach/Instruct patient to call for assistance before getting out of bed/chair/stretcher/Non-slip footwear applied when patient is off stretcher/[place] to call system/Physically safe environment - no spills, clutter or unnecessary equipment/Purposeful Proactive Rounding/Room/bathroom lighting operational, light cord in reach

## 2023-05-16 NOTE — ED PROVIDER NOTE - PHYSICAL EXAMINATION
Gen: Well appearing in NAD.   Head: atraumatic  Heart: s1/s2, RRR  Lung: CTA b/l, no wheezing/rhonchi or rales  Abd: soft, NT/ND,   Msk: No midline spinal tenderness to palpation.  Positive tenderness over the left para-lumbar muscles.  Positive straight leg raise on the left.  2+ pedal pulses.  No neurovascular compromise on exam  Neuro: AAO x3, patient moving all extremity equally  Skin: Normal for race. No rashes or bruising   Psych: Alert and oriented

## 2023-05-17 LAB
ANION GAP SERPL CALC-SCNC: 6 MMOL/L — SIGNIFICANT CHANGE UP (ref 5–17)
BUN SERPL-MCNC: 7 MG/DL — SIGNIFICANT CHANGE UP (ref 7–23)
CALCIUM SERPL-MCNC: 10.2 MG/DL — HIGH (ref 8.5–10.1)
CHLORIDE SERPL-SCNC: 98 MMOL/L — SIGNIFICANT CHANGE UP (ref 96–108)
CO2 SERPL-SCNC: 27 MMOL/L — SIGNIFICANT CHANGE UP (ref 22–31)
CREAT SERPL-MCNC: 0.6 MG/DL — SIGNIFICANT CHANGE UP (ref 0.5–1.3)
EGFR: 87 ML/MIN/1.73M2 — SIGNIFICANT CHANGE UP
GLUCOSE SERPL-MCNC: 102 MG/DL — HIGH (ref 70–99)
HCT VFR BLD CALC: 30.2 % — LOW (ref 34.5–45)
HGB BLD-MCNC: 9.8 G/DL — LOW (ref 11.5–15.5)
MAGNESIUM SERPL-MCNC: 2.1 MG/DL — SIGNIFICANT CHANGE UP (ref 1.6–2.6)
MCHC RBC-ENTMCNC: 27.4 PG — SIGNIFICANT CHANGE UP (ref 27–34)
MCHC RBC-ENTMCNC: 32.5 GM/DL — SIGNIFICANT CHANGE UP (ref 32–36)
MCV RBC AUTO: 84.4 FL — SIGNIFICANT CHANGE UP (ref 80–100)
NRBC # BLD: 0 /100 WBCS — SIGNIFICANT CHANGE UP (ref 0–0)
PLATELET # BLD AUTO: 148 K/UL — LOW (ref 150–400)
POTASSIUM SERPL-MCNC: 3.5 MMOL/L — SIGNIFICANT CHANGE UP (ref 3.5–5.3)
POTASSIUM SERPL-SCNC: 3.5 MMOL/L — SIGNIFICANT CHANGE UP (ref 3.5–5.3)
RBC # BLD: 3.58 M/UL — LOW (ref 3.8–5.2)
RBC # FLD: 15 % — HIGH (ref 10.3–14.5)
SODIUM SERPL-SCNC: 131 MMOL/L — LOW (ref 135–145)
WBC # BLD: 5.36 K/UL — SIGNIFICANT CHANGE UP (ref 3.8–10.5)
WBC # FLD AUTO: 5.36 K/UL — SIGNIFICANT CHANGE UP (ref 3.8–10.5)

## 2023-05-17 PROCEDURE — 78306 BONE IMAGING WHOLE BODY: CPT | Mod: 26

## 2023-05-17 PROCEDURE — 72146 MRI CHEST SPINE W/O DYE: CPT | Mod: 26

## 2023-05-17 PROCEDURE — 72148 MRI LUMBAR SPINE W/O DYE: CPT | Mod: 26

## 2023-05-17 PROCEDURE — 93010 ELECTROCARDIOGRAM REPORT: CPT

## 2023-05-17 PROCEDURE — 72141 MRI NECK SPINE W/O DYE: CPT | Mod: 26

## 2023-05-17 RX ORDER — TRAMADOL HYDROCHLORIDE 50 MG/1
100 TABLET ORAL EVERY 6 HOURS
Refills: 0 | Status: DISCONTINUED | OUTPATIENT
Start: 2023-05-17 | End: 2023-05-20

## 2023-05-17 RX ORDER — ASCORBIC ACID 60 MG
1 TABLET,CHEWABLE ORAL
Qty: 0 | Refills: 0 | DISCHARGE

## 2023-05-17 RX ORDER — OMEPRAZOLE 10 MG/1
1 CAPSULE, DELAYED RELEASE ORAL
Qty: 0 | Refills: 0 | DISCHARGE

## 2023-05-17 RX ORDER — NEBIVOLOL HYDROCHLORIDE 5 MG/1
1 TABLET ORAL
Qty: 0 | Refills: 0 | DISCHARGE

## 2023-05-17 RX ORDER — TRAMADOL HYDROCHLORIDE 50 MG/1
50 TABLET ORAL EVERY 6 HOURS
Refills: 0 | Status: DISCONTINUED | OUTPATIENT
Start: 2023-05-17 | End: 2023-05-20

## 2023-05-17 RX ADMIN — Medication 25 MILLIGRAM(S): at 18:25

## 2023-05-17 RX ADMIN — LIDOCAINE 1 PATCH: 4 CREAM TOPICAL at 02:32

## 2023-05-17 RX ADMIN — TRAMADOL HYDROCHLORIDE 100 MILLIGRAM(S): 50 TABLET ORAL at 22:00

## 2023-05-17 RX ADMIN — CYCLOBENZAPRINE HYDROCHLORIDE 5 MILLIGRAM(S): 10 TABLET, FILM COATED ORAL at 14:04

## 2023-05-17 RX ADMIN — Medication 25 MILLIGRAM(S): at 05:25

## 2023-05-17 RX ADMIN — CYCLOBENZAPRINE HYDROCHLORIDE 5 MILLIGRAM(S): 10 TABLET, FILM COATED ORAL at 21:27

## 2023-05-17 RX ADMIN — TRAMADOL HYDROCHLORIDE 100 MILLIGRAM(S): 50 TABLET ORAL at 21:28

## 2023-05-17 RX ADMIN — SENNA PLUS 2 TABLET(S): 8.6 TABLET ORAL at 21:27

## 2023-05-17 RX ADMIN — CYCLOBENZAPRINE HYDROCHLORIDE 5 MILLIGRAM(S): 10 TABLET, FILM COATED ORAL at 05:25

## 2023-05-17 NOTE — PATIENT PROFILE ADULT - NSPROGENBLOODRESTRICT_GEN_A_NUR
I just saw her recently. Please call her to ask what her question is.
Patient is requesting a call back in regards to her lab work. She can be reached at [phone].
Spoke with patient. She states that her daughter is having surgery soon and will be the primary caretaker of her grandbaby. She wanted to let Dr Robert Robertson know that she cannot get her labs drawn in 2 weeks from last OV. She will need to delay it a week or 2. She just wanted to let Dr Robert Robertson know.
none

## 2023-05-17 NOTE — CONSULT NOTE ADULT - ASSESSMENT
Assessment & Plan: 88 y/o female presents with T11/L1/L4 VCF  now with deficits in mobility and ADL's.     PT: Focus on extension based program, gait training, AD training, static and dynamic balance training, functional activity tolerance, sensorimotor skills, increase body awareness, improve range of motion, strengthening, endurance training, neuromuscular training and control.    OT: Focus on activities of daily living, transfers and functional mobility training    PRECAUTIONS: General safety, Falls, TLSO when OOB.     Active Medical Problems that may affect rehabilitation course and have an impact on functional outcome:   #Multiple compression fractures- TLSO ordered.  PT program once cleared by Orthopedics with focus on extension based program. Outpatient Orthopedics follow up.  Pain- Tylenol 1000 mg q 8 hours PRN for mild pain (1-3)  Tramadol 50 mg q 6 hours PRN for moderate pain (4-6)  Tramadol 100 mg q 6 hours PRN for severe pain (7-10)  Continue flexeril 5 mg TID PRN for spasms (monitor for sedation)  #Gait Instability: Patient with imbalance, instability, reduced strength in lower extremities. Continue with full PT/OT program with focus as above.  #Physical Deconditioning: Patient with reduced exercise tolerance in setting of medical comorbidities. Continue with full PT/OT program with focus on improving exercise tolerance and endurance.  #ADL and Mobility dysfunction: Secondary to above, continue PT/OT Evaluate for assistive devices. Progress to stair management and community re-entry skills.    Prophylaxis:   Skin checks, turning and positioning to prevent pressure injury  DVT prophylaxis per primary team    Anticipated Discharge Destination:   Subacute Rehabilitation-  Patient will benefit from a less intensive intervention of multiple therapy disciplines (Physical Therapy [PT], Occupational Therapy [OT].        Panda Napoles D.O.  Board Certified Physical Medicine & Rehabilitation and Interventional Pain Physician

## 2023-05-17 NOTE — CONSULT NOTE ADULT - SUBJECTIVE AND OBJECTIVE BOX
Patient is a 87y old  Female who presents with a chief complaint of back pain (17 May 2023 12:15)    HPI:  This is a 88 y/o female h/o chronic sciatica, hypertension, chronic low back pain presents to the ED with c/o lower back pain chronically but worsened 3 to 4 days ago.  Patient reports when she moves she feels a spasm in her back sometimes the spasm is so bad it radiates to the front of her abdomen.  She denies any fall or trauma, nausea vomiting diarrhea, urinary symptoms, fever chills or all other complaints. In the ER, CT shows multiple compression fractures and ?L rib lesion. (16 May 2023 19:15)    Renal consult called for hyponatremia. History obtained from chart and patient.       PAST MEDICAL HISTORY:  Hypertension    GERD (gastroesophageal reflux disease)    History of ankle surgery    Ankle fracture, left    Gastro-esophageal reflux disease without esophagitis    Benign essential hypertension    Chronic cough    Chronic low back pain    Dyspnea on exertion    Back pain with sciatica    Regurgitation of food        PAST SURGICAL HISTORY:  Ankle fracture, left    H/O endoscopy    H/O colonoscopy    History of tonsillectomy        FAMILY HISTORY:      SOCIAL HISTORY: No smoking or alcohol use     Allergies    No Known Allergies    Intolerances      Home Medications:  Nebivolol 10 mg oral tablet: 1 tab(s) orally once a day- IN AM (03 Feb 2023 19:41)  omeprazole 40 mg oral delayed release capsule: 1 cap(s) orally once a day- IN AM (03 Feb 2023 19:41)  Vitamin C 1000 mg oral tablet: 1 tab(s) orally 3 times a week- IN AM (03 Feb 2023 19:41)    MEDICATIONS  (STANDING):  cyclobenzaprine 5 milliGRAM(s) Oral three times a day  metoprolol tartrate 25 milliGRAM(s) Oral every 12 hours  senna 2 Tablet(s) Oral at bedtime    MEDICATIONS  (PRN):  acetaminophen     Tablet .. 650 milliGRAM(s) Oral every 6 hours PRN Temp greater or equal to 38C (100.4F), Mild Pain (1 - 3)  aluminum hydroxide/magnesium hydroxide/simethicone Suspension 30 milliLiter(s) Oral every 4 hours PRN Dyspepsia  melatonin 3 milliGRAM(s) Oral at bedtime PRN Insomnia  ondansetron Injectable 4 milliGRAM(s) IV Push every 6 hours PRN Nausea and/or Vomiting  oxyCODONE    IR 5 milliGRAM(s) Oral every 4 hours PRN Severe Pain (7 - 10)  traMADol 50 milliGRAM(s) Oral every 6 hours PRN Moderate Pain (4 - 6)      REVIEW OF SYSTEMS:  General: no distress  Respiratory: No cough, SOB  Cardiovascular: No CP or Palpitations	  Gastrointestinal: No nausea, Vomiting. No diarrhea  Genitourinary: No urinary complaints	  Musculoskeletal: No new rash or lesions		  all other systems negative    T(F): 97.7 (05-17-23 @ 13:59), Max: 97.9 (05-16-23 @ 21:27)  HR: 70 (05-17-23 @ 13:59) (68 - 78)  BP: 132/71 (05-17-23 @ 13:59) (120/54 - 132/71)  RR: 18 (05-17-23 @ 13:59) (18 - 18)  SpO2: 94% (05-17-23 @ 13:59) (94% - 98%)  Wt(kg): --    PHYSICAL EXAM:  General: NAD  Respiratory: b/l air entry  Cardiovascular: S1 S2  Gastrointestinal: soft  Extremities: no edema            LABORATORY:                        9.8    5.36  )-----------( 148      ( 17 May 2023 05:29 )             30.2     05-17    131<L>  |  98  |  7   ----------------------------<  102<H>  3.5   |  27  |  0.60    Ca    10.2<H>      17 May 2023 05:29  Mg     2.1     05-17    TPro  8.1  /  Alb  3.5  /  TBili  0.4  /  DBili  x   /  AST  22  /  ALT  22  /  AlkPhos  107  05-16    Sodium, Serum: 131 mmol/L (05-17 @ 05:29)  Sodium, Serum: 130 mmol/L (05-16 @ 14:30)    Potassium, Serum: 3.5 mmol/L (05-17 @ 05:29)  Potassium, Serum: 3.8 mmol/L (05-16 @ 14:30)    Hemoglobin: 9.8 g/dL (05-17 @ 05:29)  Hemoglobin: 10.5 g/dL (05-16 @ 14:30)    Creatinine, Serum 0.60 (05-17 @ 05:29)  Creatinine, Serum 0.55 (05-16 @ 14:30)        LIVER FUNCTIONS - ( 16 May 2023 14:30 )  Alb: 3.5 g/dL / Pro: 8.1 g/dL / ALK PHOS: 107 U/L / ALT: 22 U/L / AST: 22 U/L / GGT: x

## 2023-05-17 NOTE — CONSULT NOTE ADULT - SUBJECTIVE AND OBJECTIVE BOX
INCOMPLETE NOTE.  Documentation in Progress  PT SEEN AND EVALUATED.   FULL/ADDITIONAL RECOMMENDATIONS TO FOLLOW   ***************************************************************  Patient is a 87y old  Female who presents with a chief complaint of back pain (17 May 2023 18:06)    HPI:  This is a 88 y/o female h/o chronic sciatica, hypertension, chronic low back pain presents to the ED with c/o lower back pain chronically but worsened 3 to 4 days ago.  Patient reports when she moves she feels a spasm in her back sometimes the spasm is so bad it radiates to the front of her abdomen.  She denies any fall or trauma, nausea vomiting diarrhea, urinary symptoms, fever chills or all other complaints. In the ER, CT shows multiple compression fractures and ?L rib lesion. (16 May 2023 19:15)    PAST MEDICAL & SURGICAL HISTORY:  Hypertension      GERD (gastroesophageal reflux disease)      Ankle fracture, left      Gastro-esophageal reflux disease without esophagitis      Benign essential hypertension      Chronic cough      Chronic low back pain      Dyspnea on exertion      Back pain with sciatica  Sciatica affects LEFT Side      Regurgitation of food      Ankle fracture, left  1980 (Hardware placed)      H/O endoscopy      H/O colonoscopy      History of tonsillectomy  As a child         HEALTH ISSUES - PROBLEM Dx:  Closed compression fracture of lumbosacral spine    Rib lesion    Essential hypertension          Unspecified fracture of unspecified lumbar vertebra, initial encounter for closed fracture [S32.009A]    Hypertension [I10]    GERD (gastroesophageal reflux disease) [K21.9]    History of ankle surgery [Z98.890]    Ankle fracture, left [S82.892A]    Gastro-esophageal reflux disease without esophagitis [K21.9]    Benign essential hypertension [I10]    Chronic cough [R05.3]    Chronic low back pain [M54.50]    Dyspnea on exertion [R06.09]    Back pain with sciatica [M54.30]    Regurgitation of food [R11.10]    Ankle fracture, left [S82.892A]    H/O endoscopy [Z98.890]    H/O colonoscopy [Z98.890]    History of tonsillectomy [Z90.89]    Thoracic vertebral fracture [S22.009A]    Fracture of one rib [S22.39XA]      FAMILY HISTORY:      [SOCIAL HISTORY: ]     smoking:       EtOH:       illicit drugs:       occupation:       marital status:       Other:     [ALLERGIES/INTOLERANCES:]  Allergies    No Known Allergies    Intolerances        [MEDICATIONS]  MEDICATIONS  (STANDING):  cyclobenzaprine 5 milliGRAM(s) Oral three times a day  metoprolol tartrate 25 milliGRAM(s) Oral every 12 hours  senna 2 Tablet(s) Oral at bedtime    MEDICATIONS  (PRN):  acetaminophen     Tablet .. 650 milliGRAM(s) Oral every 6 hours PRN Temp greater or equal to 38C (100.4F), Mild Pain (1 - 3)  aluminum hydroxide/magnesium hydroxide/simethicone Suspension 30 milliLiter(s) Oral every 4 hours PRN Dyspepsia  melatonin 3 milliGRAM(s) Oral at bedtime PRN Insomnia  ondansetron Injectable 4 milliGRAM(s) IV Push every 6 hours PRN Nausea and/or Vomiting  traMADol 50 milliGRAM(s) Oral every 6 hours PRN Moderate Pain (4 - 6)  traMADol 100 milliGRAM(s) Oral every 6 hours PRN Severe Pain (7 - 10)      [REVIEW OF SYSTEMS: ]  CONSTITUTIONAL: normal, no fever, no shakes, no chills   EYES: No eye pain, no visual disturbances, no discharge  ENMT:  no discharge  NECK: No pain, no stiffness  BREASTS: No pain, no masses, no nipple discharge  RESPIRATORY: No cough, no wheezing, no chills, no hemoptysis; No shortness of breath  CARDIOVASCULAR: No chest pain, no palpitations, no dizziness, no leg swelling  GASTROINTESTINAL: No abdominal, no epigastric pain. No nausea, no vomiting, no hematemesis; No diarrhea , no constipation. No melena, no hematochezia.  GENITOURINARY: No dysuria, no frequency, no hematuria, no incontinence  NEUROLOGICAL: No headaches, no memory loss, no loss of strength, no numbness, no tremors  SKIN: No itching, no burning, no rashes, no lesions   LYMPH NODES: No enlarged glands  ENDOCRINE: No heat or cold intolerance; No hair loss  MUSCULOSKELETAL: No joint pain or swelling; No muscle, no back, no extremity pain  PSYCHIATRIC: No depression, no anxiety, no mood swings, no difficulty sleeping  HEME/LYMPH: No easy bruising, no bleeding gums    [VITALS SIGNS 24hrs]  Vital Signs Last 24 Hrs  T(C): 36.5 (17 May 2023 13:59), Max: 36.6 (16 May 2023 21:27)  T(F): 97.7 (17 May 2023 13:59), Max: 97.9 (16 May 2023 21:27)  HR: 80 (17 May 2023 18:20) (68 - 80)  BP: 131/70 (17 May 2023 18:20) (120/54 - 132/71)  BP(mean): --  RR: 18 (17 May 2023 13:59) (18 - 18)  SpO2: 94% (17 May 2023 13:59) (94% - 98%)    Parameters below as of 17 May 2023 13:59  Patient On (Oxygen Delivery Method): room air      Daily     Daily     I&O's Summary      [PHYSICAL EXAM]  GEN:   HEENT: normocephalic and atraumatic. EOMI. PERRL.    NECK: Supple.  No lymphadenopathy   LUNGS: Clear to auscultation.  HEART: S1S2 Regular rate and rhythm, no MRG  ABDOMEN: Soft, nontender, and nondistended.  Positive bowel sounds.    : No CVA tenderness  EXTREMITIES: Without edema.  NEUROLOGIC: grossly intact.  PSYCHIATRIC: Appropriate affect .  SKIN: No rash     [LABS: ]                        9.8    5.36  )-----------( 148      ( 17 May 2023 05:29 )             30.2     CBC Full  -  ( 17 May 2023 05:29 )  WBC Count : 5.36 K/uL  RBC Count : 3.58 M/uL  Hemoglobin : 9.8 g/dL  Hematocrit : 30.2 %  Platelet Count - Automated : 148 K/uL  Mean Cell Volume : 84.4 fl  Mean Cell Hemoglobin : 27.4 pg  Mean Cell Hemoglobin Concentration : 32.5 gm/dL  Auto Neutrophil # : x  Auto Lymphocyte # : x  Auto Monocyte # : x  Auto Eosinophil # : x  Auto Basophil # : x  Auto Neutrophil % : x  Auto Lymphocyte % : x  Auto Monocyte % : x  Auto Eosinophil % : x  Auto Basophil % : x    05-17    131<L>  |  98  |  7   ----------------------------<  102<H>  3.5   |  27  |  0.60    Ca    10.2<H>      17 May 2023 05:29  Mg     2.1     05-17    TPro  8.1  /  Alb  3.5  /  TBili  0.4  /  DBili  x   /  AST  22  /  ALT  22  /  AlkPhos  107  05-16      LIVER FUNCTIONS - ( 16 May 2023 14:30 )  Alb: 3.5 g/dL / Pro: 8.1 g/dL / ALK PHOS: 107 U/L / ALT: 22 U/L / AST: 22 U/L / GGT: x                     CBC TREND (5 Days)  WBC Count: 5.36 K/uL (05-17 @ 05:29)  WBC Count: 7.78 K/uL (05-16 @ 14:30)    Hemoglobin: 9.8 g/dL (05-17 @ 05:29)  Hemoglobin: 10.5 g/dL (05-16 @ 14:30)    Hematocrit: 30.2 % (05-17 @ 05:29)  Hematocrit: 31.5 % (05-16 @ 14:30)    Platelet Count - Automated: 148 K/uL (05-17 @ 05:29)  Platelet Count - Automated: 143 K/uL (05-16 @ 14:30)                                          [MICROBIOLOGY /  VIROLOGY:]  COVID-19 PCR: NotDetec (03 Feb 2023 21:55)        [PATHOLOGY]     [RADIOLOGY & ADDITIONAL STUDIES:]      Patient is a 87y old  Female who presents with a chief complaint of back pain (17 May 2023 18:06)    HPI:  This is a 86 y/o female h/o chronic sciatica, hypertension, chronic low back pain presents to the ED with c/o lower back pain chronically but worsened 3 to 4 days ago.  Patient reports when she moves she feels a spasm in her back sometimes the spasm is so bad it radiates to the front of her abdomen.  She denies any fall or trauma, nausea vomiting diarrhea, urinary symptoms, fever chills or all other complaints. In the ER, CT shows multiple compression fractures and ?L rib lesion. (16 May 2023 19:15)    PAST MEDICAL & SURGICAL HISTORY:  Hypertension      GERD (gastroesophageal reflux disease)      Ankle fracture, left      Gastro-esophageal reflux disease without esophagitis      Benign essential hypertension      Chronic cough      Chronic low back pain      Dyspnea on exertion      Back pain with sciatica  Sciatica affects LEFT Side      Regurgitation of food      Ankle fracture, left  1980 (Hardware placed)      H/O endoscopy      H/O colonoscopy      History of tonsillectomy  As a child         HEALTH ISSUES - PROBLEM Dx:  Closed compression fracture of lumbosacral spine    Rib lesion    Essential hypertension          Unspecified fracture of unspecified lumbar vertebra, initial encounter for closed fracture [S32.009A]    Hypertension [I10]    GERD (gastroesophageal reflux disease) [K21.9]    History of ankle surgery [Z98.890]    Ankle fracture, left [S82.892A]    Gastro-esophageal reflux disease without esophagitis [K21.9]    Benign essential hypertension [I10]    Chronic cough [R05.3]    Chronic low back pain [M54.50]    Dyspnea on exertion [R06.09]    Back pain with sciatica [M54.30]    Regurgitation of food [R11.10]    Ankle fracture, left [S82.892A]    H/O endoscopy [Z98.890]    H/O colonoscopy [Z98.890]    History of tonsillectomy [Z90.89]    Thoracic vertebral fracture [S22.009A]    Fracture of one rib [S22.39XA]      FAMILY HISTORY:      [SOCIAL HISTORY: ]     smoking:       EtOH:       illicit drugs:       occupation:       marital status:       Other:     [ALLERGIES/INTOLERANCES:]  Allergies    No Known Allergies    Intolerances        [MEDICATIONS]  MEDICATIONS  (STANDING):  cyclobenzaprine 5 milliGRAM(s) Oral three times a day  metoprolol tartrate 25 milliGRAM(s) Oral every 12 hours  senna 2 Tablet(s) Oral at bedtime    MEDICATIONS  (PRN):  acetaminophen     Tablet .. 650 milliGRAM(s) Oral every 6 hours PRN Temp greater or equal to 38C (100.4F), Mild Pain (1 - 3)  aluminum hydroxide/magnesium hydroxide/simethicone Suspension 30 milliLiter(s) Oral every 4 hours PRN Dyspepsia  melatonin 3 milliGRAM(s) Oral at bedtime PRN Insomnia  ondansetron Injectable 4 milliGRAM(s) IV Push every 6 hours PRN Nausea and/or Vomiting  traMADol 50 milliGRAM(s) Oral every 6 hours PRN Moderate Pain (4 - 6)  traMADol 100 milliGRAM(s) Oral every 6 hours PRN Severe Pain (7 - 10)      [REVIEW OF SYSTEMS: ]  CONSTITUTIONAL: normal, no fever, no shakes, no chills   EYES: No eye pain, no visual disturbances, no discharge  ENMT:  no discharge  NECK: No pain, no stiffness  BREASTS: No pain, no masses, no nipple discharge  RESPIRATORY: No cough, no wheezing, no chills, no hemoptysis; No shortness of breath  CARDIOVASCULAR: No chest pain, no palpitations, no dizziness, no leg swelling  GASTROINTESTINAL: No abdominal, no epigastric pain. No nausea, no vomiting, no hematemesis; No diarrhea , no constipation. No melena, no hematochezia.  GENITOURINARY: No dysuria, no frequency, no hematuria, no incontinence  NEUROLOGICAL: No headaches, no memory loss, no loss of strength, no numbness, no tremors  SKIN: No itching, no burning, no rashes, no lesions   LYMPH NODES: No enlarged glands  ENDOCRINE: No heat or cold intolerance; No hair loss  MUSCULOSKELETAL: No joint pain or swelling; No muscle, no back, no extremity pain  PSYCHIATRIC: No depression, no anxiety, no mood swings, no difficulty sleeping  HEME/LYMPH: No easy bruising, no bleeding gums    [VITALS SIGNS 24hrs]  Vital Signs Last 24 Hrs  T(C): 36.5 (17 May 2023 13:59), Max: 36.6 (16 May 2023 21:27)  T(F): 97.7 (17 May 2023 13:59), Max: 97.9 (16 May 2023 21:27)  HR: 80 (17 May 2023 18:20) (68 - 80)  BP: 131/70 (17 May 2023 18:20) (120/54 - 132/71)  BP(mean): --  RR: 18 (17 May 2023 13:59) (18 - 18)  SpO2: 94% (17 May 2023 13:59) (94% - 98%)    Parameters below as of 17 May 2023 13:59  Patient On (Oxygen Delivery Method): room air      Daily     Daily     I&O's Summary      [PHYSICAL EXAM]  GEN:   HEENT: normocephalic and atraumatic. EOMI. PERRL.    NECK: Supple.  No lymphadenopathy   LUNGS: Clear to auscultation.  HEART: S1S2 Regular rate and rhythm, no MRG  ABDOMEN: Soft, nontender, and nondistended.  Positive bowel sounds.    : No CVA tenderness  EXTREMITIES: Without edema.  NEUROLOGIC: grossly intact.  PSYCHIATRIC: Appropriate affect .  SKIN: No rash     [LABS: ]                        9.8    5.36  )-----------( 148      ( 17 May 2023 05:29 )             30.2     CBC Full  -  ( 17 May 2023 05:29 )  WBC Count : 5.36 K/uL  RBC Count : 3.58 M/uL  Hemoglobin : 9.8 g/dL  Hematocrit : 30.2 %  Platelet Count - Automated : 148 K/uL  Mean Cell Volume : 84.4 fl  Mean Cell Hemoglobin : 27.4 pg  Mean Cell Hemoglobin Concentration : 32.5 gm/dL  Auto Neutrophil # : x  Auto Lymphocyte # : x  Auto Monocyte # : x  Auto Eosinophil # : x  Auto Basophil # : x  Auto Neutrophil % : x  Auto Lymphocyte % : x  Auto Monocyte % : x  Auto Eosinophil % : x  Auto Basophil % : x    05-17    131<L>  |  98  |  7   ----------------------------<  102<H>  3.5   |  27  |  0.60    Ca    10.2<H>      17 May 2023 05:29  Mg     2.1     05-17    TPro  8.1  /  Alb  3.5  /  TBili  0.4  /  DBili  x   /  AST  22  /  ALT  22  /  AlkPhos  107  05-16      LIVER FUNCTIONS - ( 16 May 2023 14:30 )  Alb: 3.5 g/dL / Pro: 8.1 g/dL / ALK PHOS: 107 U/L / ALT: 22 U/L / AST: 22 U/L / GGT: x                     CBC TREND (5 Days)  WBC Count: 5.36 K/uL (05-17 @ 05:29)  WBC Count: 7.78 K/uL (05-16 @ 14:30)    Hemoglobin: 9.8 g/dL (05-17 @ 05:29)  Hemoglobin: 10.5 g/dL (05-16 @ 14:30)    Hematocrit: 30.2 % (05-17 @ 05:29)  Hematocrit: 31.5 % (05-16 @ 14:30)    Platelet Count - Automated: 148 K/uL (05-17 @ 05:29)  Platelet Count - Automated: 143 K/uL (05-16 @ 14:30)              [MICROBIOLOGY /  VIROLOGY:]  COVID-19 PCR: NotDetec (03 Feb 2023 21:55)        [PATHOLOGY]     [RADIOLOGY & ADDITIONAL STUDIES:]   ACC: [id number] EXAM:  NM BONE IMG WHOLE BODY   ORDERED BY: ASHER PARHAM   PROCEDURE DATE:  05/17/2023    INTERPRETATION:  CLINICAL INFORMATION: Left 6th rib lesion. Back pain.   Abdominal Pain. Evaluate for bone metastasis.    RADIOPHARMACEUTICAL: 20 mCi Tc-99m MDP, I.V.    TECHNIQUE: Anterior and posterior whole body images were obtained 2-3   hours following administration of radiopharmaceutical.    COMPARISON: None    OTHER STUDIES USED FOR CORRELATION: MRI cervical, thoracic and lumbar   spine 05/17/2023 and CT CAP 05/16/2023.    FINDINGS:    Heterogeneous uptake along the cervicothoracolumbar spine most consistent   with degenerative changes and compression fractures.    Otherwise degenerative changes are also noted in both shoulders, elbows,   hands, knees and feet.    Single rib focus is noted at the left lateral 6th rib.    No additional suspicious lesions.    Both kidneys are visualized.    The bladder is distended even after voiding which limits accurate   evaluation of the bony pelvis.    IMPRESSION: No definite scintigraphic evidence of osseous metastasis.    Indeterminate single rib focus at the left lateral 6th rib.    --- End of Report ---     ROBINSON PRADO MD; Attending Nuclear Medicine  This document has been electronically signed. May 17 2023  6:04PM      < from: MR Lumbar Spine No Cont (05.17.23 @ 13:02) >    ACC: [id number] EXAM:  MR SPINE THORACIC   ORDERED BY: DARLIN MAO [facility]     ACC: [id number] EXAM:  MR SPINE LUMBAR   ORDERED BY: DARLIN D [facility]     ACC: [id number] EXAM:  MR SPINE CERVICAL   ORDERED BY: DARLIN MAO [facility]     PROCEDURE DATE:  05/17/2023          INTERPRETATION:  CLINICAL STATEMENT: Pathologic fracture left lateral   sixth rib. Multiple vertebral body compression fractures.    TECHNIQUE: Multiplanar multisequence noncontrast MRI cervical, thoracic   and lumbar spine. Multiple images degraded by motion artifact.  COMPARISON: CT chest, abdomen and lumbar spine 5/16/2023.    FINDINGS:    MRI CERVICAL    No cervical compression fracture, spondylolisthesis or evidence of marrow   replacement process. No visualized intrathecal mass.    Findings most concordant with multiple lower cervical and upper thoracic   perineural cysts, measuring up to 1.5 cm at the right T1-T2 level.   Approximate 2.5 cm left thyroid mass incidentally noted.    Diffuse disc desiccation with multilevel endplate and uncovertebral   spurring.    C2-C3: Disc bulge, without stenosis.  C3-C4: Disc bulge, without stenosis.  C4-C5: Disc bulge, without stenosis.  C5-C6: Disc bulge, effacing the subarachnoid space and flattening the   ventral cord, resulting in mild central canal stenosis.  C6-C7: Disc bulge, without stenosis.  C7-T1: No disc herniation or stenosis.    MRI THORACIC    No visualized pleural fluid collection. No gross intrathecal mass however   suboptimally evaluated secondary to motion artifact and CSF pulsation   phenomenon. Several subcentimeter scattered perineural cysts thoracic   spine.    Rightward curvature thoracic spine. Varying degrees of diffuse disc   desiccation with multilevel endplate spurring. Evidence of multilevel   lower thoracic facet arthrosis. Multilevel thoracic disc bulges. No   thoracic disc herniation.    Severe T2 compression fracture, demonstrating up to approximately 4 mm   retropulsion, effacing the subarachnoid space and flattening the ventral   cord. No associatedmarrow edema.    Mild T11 superior endplate compression deformity with faint fracture line   and mild superior endplate marrow edema. No retropulsion.    No visualized epidural or paraspinal soft tissue mass in the above   regions.    MRI LUMBAR    Nonspecific edema posterior paraspinal subcutaneous fat. Prominent   bladder volume.    Leftward curvature. Grade 1 anterolisthesis L5 over S1 with severe   bilateral facet arthrosis at this level. Varying degrees of diffuse disc   desiccation with multilevel endplate spurring, facet arthrosis and   ligamentum flavum hypertrophy.    Severe L1 compression fracture, demonstrating mild marrow edema and up to   approximately 3 mm retropulsion, impinging upon the thecal sac.    Moderate-severe L4 compression fracture, most pronounced centrally,   demonstrating mild associated marrow edema. No retropulsion.    No visualized epidural or paraspinal soft tissue mass in the above   regions.    L1-L2: Disc bulge, without stenosis.  L2-L3: No disc herniation or stenosis.  L3-L4: Disc bulge, resulting in mild bilateral neural foramen stenosis   with facet arthrosis.  L4-L5: Disc bulge, resulting in mild central canal, bilateral lateral   recess and mild bilateral neural foramen stenosis with facet arthrosis   and ligamentum flavum hypertrophy.  L5-S1: Aforementioned anterolisthesis with a disc bulge, facet arthrosis   and ligamentum flavum hypertrophy, resulting in severe central canal,   bilateral lateral recess and moderate bilateral neural foramen stenosis.   Right paracentral-foraminal annular tears.    IMPRESSION:    MRI CERVICAL  1. No cervical compression fracture.  2. C5-C6 disc bulge, flattening the ventral cord and resulting in mild   central canal stenosis. No cervical disc herniation.  3. Additional cervical findings, described in detail above, including a   2.5 cm left thyroid mass. Recommend thyroid ultrasound, as this is of   indeterminate neoplastic-malignant potential.    MRI THORACIC  1. Chronic severe T2 compression fracture demonstrating up to 4 mm   retropulsion, effacing the subarachnoid space and flattening the ventral   cord.  2. Mild, probable subacute T11 superior endplate compression fracture,   without retropulsion.  3. Additional findings described in detail above.    MRI LUMBAR  1. Severe, probable subacute L1 compression fracture demonstrating up to   3 mm retropulsion, impinging upon the thecal sac.  2. Moderate-severe, probable subacute L4 compression fracture, without   associated retropulsion.  3. Grade 1 anterolisthesis L5 over S1 with severe bilateral L5-S1 facet   arthrosis.  4. L4-L5 disc bulge, resulting in mild central canal, bilateral lateral   recess and mild bilateral neural foramen stenosis with facet arthrosis   and ligamentum flavum hypertrophy.  5. L5-S1 anterolisthesis with a disc bulge, facet arthrosis and   ligamentum flavum hypertrophy, resulting in severe central canal,   bilateral lateral recess and moderate bilateral neural foramen stenosis.   Right paracentral-foraminal annular tears.  6. Additional findings described in detail above.    --- End of Report ---      WILFREDO HINTON M.D., ATTENDING RADIOLOGIST  This document has been electronically signed. May 17 2023  2:29PM    < end of copied text >   Patient is a 87y old  Female who presents with a chief complaint of back pain (17 May 2023 18:06)    HPI:  This is a 86 y/o female h/o chronic sciatica, hypertension, chronic low back pain presents to the ED with c/o lower back pain chronically but worsened 3 to 4 days ago.  Patient reports when she moves she feels a spasm in her back sometimes the spasm is so bad it radiates to the front of her abdomen.  She denies any fall or trauma, nausea vomiting diarrhea, urinary symptoms, fever chills or all other complaints. In the ER, CT shows multiple compression fractures and ?L rib lesion. (16 May 2023 19:15)    PAST MEDICAL & SURGICAL HISTORY:  Hypertension  GERD (gastroesophageal reflux disease)  Ankle fracture, left  Gastro-esophageal reflux disease without esophagitis  Benign essential hypertension  Chronic cough  Chronic low back pain  Dyspnea on exertion  Back pain with sciatica Sciatica affects LEFT Side  Regurgitation of food  Ankle fracture, left 1980 (Hardware placed)  H/O endoscopy   H/O colonoscopy  History of tonsillectomy As a child         HEALTH ISSUES - PROBLEM Dx:  Closed compression fracture of lumbosacral spine    Rib lesion    Essential hypertension          Unspecified fracture of unspecified lumbar vertebra, initial encounter for closed fracture [S32.009A]    Hypertension [I10]    GERD (gastroesophageal reflux disease) [K21.9]    History of ankle surgery [Z98.890]    Ankle fracture, left [S82.892A]    Gastro-esophageal reflux disease without esophagitis [K21.9]    Benign essential hypertension [I10]    Chronic cough [R05.3]    Chronic low back pain [M54.50]    Dyspnea on exertion [R06.09]    Back pain with sciatica [M54.30]    Regurgitation of food [R11.10]    Ankle fracture, left [S82.892A]    H/O endoscopy [Z98.890]    H/O colonoscopy [Z98.890]    History of tonsillectomy [Z90.89]    Thoracic vertebral fracture [S22.009A]    Fracture of one rib [S22.39XA]      FAMILY HISTORY:      [SOCIAL HISTORY: ]     smoking:  no cig     EtOH:  no EtOH     illicit drugs:  no illicit drugs     occupation:       marital status:       Other:     [ALLERGIES/INTOLERANCES:]  Allergies    No Known Allergies    Intolerances        [MEDICATIONS]  MEDICATIONS  (STANDING):  cyclobenzaprine 5 milliGRAM(s) Oral three times a day  metoprolol tartrate 25 milliGRAM(s) Oral every 12 hours  senna 2 Tablet(s) Oral at bedtime    MEDICATIONS  (PRN):  acetaminophen     Tablet .. 650 milliGRAM(s) Oral every 6 hours PRN Temp greater or equal to 38C (100.4F), Mild Pain (1 - 3)  aluminum hydroxide/magnesium hydroxide/simethicone Suspension 30 milliLiter(s) Oral every 4 hours PRN Dyspepsia  melatonin 3 milliGRAM(s) Oral at bedtime PRN Insomnia  ondansetron Injectable 4 milliGRAM(s) IV Push every 6 hours PRN Nausea and/or Vomiting  traMADol 50 milliGRAM(s) Oral every 6 hours PRN Moderate Pain (4 - 6)  traMADol 100 milliGRAM(s) Oral every 6 hours PRN Severe Pain (7 - 10)      [REVIEW OF SYSTEMS: ]  CONSTITUTIONAL: normal, no fever, no shakes, no chills   EYES: No eye pain, no visual disturbances, no discharge  ENMT:  no discharge  NECK: No pain, no stiffness  BREASTS: No pain, no masses, no nipple discharge  RESPIRATORY: No cough, no wheezing, no chills, no hemoptysis; No shortness of breath  CARDIOVASCULAR: No chest pain, no palpitations, no dizziness, no leg swelling  GASTROINTESTINAL: No abdominal, no epigastric pain. No nausea, no vomiting, no hematemesis; No diarrhea , no constipation. No melena, no hematochezia.  GENITOURINARY: No dysuria, no frequency, no hematuria, no incontinence  NEUROLOGICAL: No headaches, no memory loss, no loss of strength, no numbness, no tremors  SKIN: No itching, no burning, no rashes, no lesions   LYMPH NODES: No enlarged glands  ENDOCRINE: No heat or cold intolerance; No hair loss  MUSCULOSKELETAL: No joint pain or swelling; No muscle, no back, no extremity pain  PSYCHIATRIC: No depression, no anxiety, no mood swings, no difficulty sleeping  HEME/LYMPH: No easy bruising, no bleeding gums    [VITALS SIGNS 24hrs]  Vital Signs Last 24 Hrs  T(C): 36.5 (17 May 2023 13:59), Max: 36.6 (16 May 2023 21:27)  T(F): 97.7 (17 May 2023 13:59), Max: 97.9 (16 May 2023 21:27)  HR: 80 (17 May 2023 18:20) (68 - 80)  BP: 131/70 (17 May 2023 18:20) (120/54 - 132/71)  BP(mean): --  RR: 18 (17 May 2023 13:59) (18 - 18)  SpO2: 94% (17 May 2023 13:59) (94% - 98%)    Parameters below as of 17 May 2023 13:59  Patient On (Oxygen Delivery Method): room air      Daily     Daily     I&O's Summary      [PHYSICAL EXAM]  GEN:   HEENT: normocephalic and atraumatic. EOMI. PERRL.    NECK: Supple.  No lymphadenopathy   LUNGS: Clear to auscultation.  HEART: S1S2 Regular rate and rhythm, no MRG  ABDOMEN: Soft, nontender, and nondistended.  Positive bowel sounds.    : No CVA tenderness  EXTREMITIES: Without edema.  NEUROLOGIC: grossly intact.  PSYCHIATRIC: Appropriate affect .  SKIN: No rash     [LABS: ]                        9.8    5.36  )-----------( 148      ( 17 May 2023 05:29 )             30.2     CBC Full  -  ( 17 May 2023 05:29 )  WBC Count : 5.36 K/uL  RBC Count : 3.58 M/uL  Hemoglobin : 9.8 g/dL  Hematocrit : 30.2 %  Platelet Count - Automated : 148 K/uL  Mean Cell Volume : 84.4 fl  Mean Cell Hemoglobin : 27.4 pg  Mean Cell Hemoglobin Concentration : 32.5 gm/dL  Auto Neutrophil # : x  Auto Lymphocyte # : x  Auto Monocyte # : x  Auto Eosinophil # : x  Auto Basophil # : x  Auto Neutrophil % : x  Auto Lymphocyte % : x  Auto Monocyte % : x  Auto Eosinophil % : x  Auto Basophil % : x    05-17    131<L>  |  98  |  7   ----------------------------<  102<H>  3.5   |  27  |  0.60    Ca    10.2<H>      17 May 2023 05:29  Mg     2.1     05-17    TPro  8.1  /  Alb  3.5  /  TBili  0.4  /  DBili  x   /  AST  22  /  ALT  22  /  AlkPhos  107  05-16      LIVER FUNCTIONS - ( 16 May 2023 14:30 )  Alb: 3.5 g/dL / Pro: 8.1 g/dL / ALK PHOS: 107 U/L / ALT: 22 U/L / AST: 22 U/L / GGT: x                     CBC TREND (5 Days)  WBC Count: 5.36 K/uL (05-17 @ 05:29)  WBC Count: 7.78 K/uL (05-16 @ 14:30)    Hemoglobin: 9.8 g/dL (05-17 @ 05:29)  Hemoglobin: 10.5 g/dL (05-16 @ 14:30)    Hematocrit: 30.2 % (05-17 @ 05:29)  Hematocrit: 31.5 % (05-16 @ 14:30)    Platelet Count - Automated: 148 K/uL (05-17 @ 05:29)  Platelet Count - Automated: 143 K/uL (05-16 @ 14:30)              [MICROBIOLOGY /  VIROLOGY:]  COVID-19 PCR: NotDetec (03 Feb 2023 21:55)        [PATHOLOGY]     [RADIOLOGY & ADDITIONAL STUDIES:]   ACC: [id number] EXAM:  NM BONE IMG WHOLE BODY   ORDERED BY: ASHER PARHAM   PROCEDURE DATE:  05/17/2023    INTERPRETATION:  CLINICAL INFORMATION: Left 6th rib lesion. Back pain.   Abdominal Pain. Evaluate for bone metastasis.    RADIOPHARMACEUTICAL: 20 mCi Tc-99m MDP, I.V.    TECHNIQUE: Anterior and posterior whole body images were obtained 2-3   hours following administration of radiopharmaceutical.    COMPARISON: None    OTHER STUDIES USED FOR CORRELATION: MRI cervical, thoracic and lumbar   spine 05/17/2023 and CT CAP 05/16/2023.    FINDINGS:    Heterogeneous uptake along the cervicothoracolumbar spine most consistent   with degenerative changes and compression fractures.    Otherwise degenerative changes are also noted in both shoulders, elbows,   hands, knees and feet.    Single rib focus is noted at the left lateral 6th rib.    No additional suspicious lesions.    Both kidneys are visualized.    The bladder is distended even after voiding which limits accurate   evaluation of the bony pelvis.    IMPRESSION: No definite scintigraphic evidence of osseous metastasis.    Indeterminate single rib focus at the left lateral 6th rib.    --- End of Report ---     ROBINSON PRADO MD; Attending Nuclear Medicine  This document has been electronically signed. May 17 2023  6:04PM        ACC: [id number] EXAM:  MR SPINE THORACIC   ORDERED BY: DARLIN MAO [facility]   ACC: [id number] EXAM:  MR SPINE LUMBAR   ORDERED BY: DARLIN MAO [facility]   ACC: [id number] EXAM:  MR SPINE CERVICAL   ORDERED BY: DARLIN MAO [facility]   PROCEDURE DATE:  05/17/2023    INTERPRETATION:  CLINICAL STATEMENT: Pathologic fracture left lateral  sixth rib. Multiple vertebral body compression fractures.    TECHNIQUE: Multiplanar multisequence noncontrast MRI cervical, thoracic   and lumbar spine. Multiple images degraded by motion artifact.  COMPARISON: CT chest, abdomen and lumbar spine 5/16/2023.    FINDINGS:    MRI CERVICAL    No cervical compression fracture, spondylolisthesis or evidence of marrow   replacement process. No visualized intrathecal mass.    Findings most concordant with multiple lower cervical and upper thoracic   perineural cysts, measuring up to 1.5 cm at the right T1-T2 level.   Approximate 2.5 cm left thyroid mass incidentally noted.    Diffuse disc desiccation with multilevel endplate and uncovertebral   spurring.    C2-C3: Disc bulge, without stenosis.  C3-C4: Disc bulge, without stenosis.  C4-C5: Disc bulge, without stenosis.  C5-C6: Disc bulge, effacing the subarachnoid space and flattening the   ventral cord, resulting in mild central canal stenosis.  C6-C7: Disc bulge, without stenosis.  C7-T1: No disc herniation or stenosis.    MRI THORACIC    No visualized pleural fluid collection. No gross intrathecal mass however   suboptimally evaluated secondary to motion artifact and CSF pulsation   phenomenon. Several subcentimeter scattered perineural cysts thoracic   spine.    Rightward curvature thoracic spine. Varying degrees of diffuse disc   desiccation with multilevel endplate spurring. Evidence of multilevel   lower thoracic facet arthrosis. Multilevel thoracic disc bulges. No   thoracic disc herniation.    Severe T2 compression fracture, demonstrating up to approximately 4 mm   retropulsion, effacing the subarachnoid space and flattening the ventral   cord. No associatedmarrow edema.    Mild T11 superior endplate compression deformity with faint fracture line   and mild superior endplate marrow edema. No retropulsion.    No visualized epidural or paraspinal soft tissue mass in the above   regions.    MRI LUMBAR    Nonspecific edema posterior paraspinal subcutaneous fat. Prominent   bladder volume.    Leftward curvature. Grade 1 anterolisthesis L5 over S1 with severe   bilateral facet arthrosis at this level. Varying degrees of diffuse disc   desiccation with multilevel endplate spurring, facet arthrosis and   ligamentum flavum hypertrophy.    Severe L1 compression fracture, demonstrating mild marrow edema and up to   approximately 3 mm retropulsion, impinging upon the thecal sac.    Moderate-severe L4 compression fracture, most pronounced centrally,   demonstrating mild associated marrow edema. No retropulsion.    No visualized epidural or paraspinal soft tissue mass in the above   regions.    L1-L2: Disc bulge, without stenosis.  L2-L3: No disc herniation or stenosis.  L3-L4: Disc bulge, resulting in mild bilateral neural foramen stenosis   with facet arthrosis.  L4-L5: Disc bulge, resulting in mild central canal, bilateral lateral   recess and mild bilateral neural foramen stenosis with facet arthrosis   and ligamentum flavum hypertrophy.  L5-S1: Aforementioned anterolisthesis with a disc bulge, facet arthrosis   and ligamentum flavum hypertrophy, resulting in severe central canal,   bilateral lateral recess and moderate bilateral neural foramen stenosis.   Right paracentral-foraminal annular tears.    IMPRESSION:    MRI CERVICAL  1. No cervical compression fracture.  2. C5-C6 disc bulge, flattening the ventral cord and resulting in mild   central canal stenosis. No cervical disc herniation.  3. Additional cervical findings, described in detail above, including a   2.5 cm left thyroid mass. Recommend thyroid ultrasound, as this is of   indeterminate neoplastic-malignant potential.    MRI THORACIC  1. Chronic severe T2 compression fracture demonstrating up to 4 mm   retropulsion, effacing the subarachnoid space and flattening the ventral   cord.  2. Mild, probable subacute T11 superior endplate compression fracture,   without retropulsion.  3. Additional findings described in detail above.    MRI LUMBAR  1. Severe, probable subacute L1 compression fracture demonstrating up to   3 mm retropulsion, impinging upon the thecal sac.  2. Moderate-severe, probable subacute L4 compression fracture, without   associated retropulsion.  3. Grade 1 anterolisthesis L5 over S1 with severe bilateral L5-S1 facet   arthrosis.  4. L4-L5 disc bulge, resulting in mild central canal, bilateral lateral   recess and mild bilateral neural foramen stenosis with facet arthrosis   and ligamentum flavum hypertrophy.  5. L5-S1 anterolisthesis with a disc bulge, facet arthrosis and   ligamentum flavum hypertrophy, resulting in severe central canal,   bilateral lateral recess and moderate bilateral neural foramen stenosis.   Right paracentral-foraminal annular tears.  6. Additional findings described in detail above.    --- End of Report ---      WILFREDO HINTON M.D., ATTENDING RADIOLOGIST  This document has been electronically signed. May 17 2023  2:29PM    < end of copied text >

## 2023-05-17 NOTE — CONSULT NOTE ADULT - SUBJECTIVE AND OBJECTIVE BOX
D/c oxycodone.   Tylenol 1000 mg TID mild pain.  Tramadol 50 mg q 6 hours PRN moderate pain  Tramadol 100 mg q 6 hours PRN severe pain.  TLSO.  PT extension based program.    Full note to follow. Discussed with patient and patient's family. D/c oxycodone.   Tylenol 1000 mg TID mild pain.  Tramadol 50 mg q 6 hours PRN moderate pain  Tramadol 100 mg q 6 hours PRN severe pain.  TLSO.  PT extension based program once cleared by Orthopedics.    Full note to follow. Discussed with patient and patient's family. HPI: Decision made to obtain and review patient’s current hospital records, which are summarized as follows.   This is a 86 y/o female h/o chronic sciatica, hypertension, chronic low back pain presents to the ED with c/o lower back pain chronically but worsened 3 to 4 days ago. Radiological studies reviewed as below.  Patient found to have multiple compression fractures and limited by pain.  Patient is seen and examined at bedside with family present.  Patient endorses 6/10 neck, midback, low back pain at this time without radiation.  Pain is worse with movement.  Patient states lidocaine patch does not help.  She is managed on tylenol PRN, tramadol 50 mg PRN for moderate pain and oxycodone 5 mg PRN for severe pain.  She states she does not like the way oxycodone makes her feel and states it is 'too strong'.      r< from: MR Cervical Spine No Cont (05.17.23 @ 13:01) >  MRI CERVICAL  1. No cervical compression fracture.  2. C5-C6 disc bulge, flattening the ventral cord and resulting in mild   central canal stenosis. No cervical disc herniation.  3. Additional cervical findings, described in detail above, including a   2.5 cm left thyroid mass. Recommend thyroid ultrasound, as this is of   indeterminate neoplastic-malignant potential.    MRI THORACIC  1. Chronic severe T2 compression fracture demonstrating up to 4 mm   retropulsion, effacing the subarachnoid space and flattening the ventral   cord.  2. Mild, probable subacute T11 superior endplate compression fracture,   without retropulsion.  3. Additional findings described in detail above.    MRI LUMBAR  1. Severe, probable subacute L1 compression fracture demonstrating up to   3 mm retropulsion, impinging upon the thecal sac.  2. Moderate-severe, probable subacute L4 compression fracture, without   associated retropulsion.  3. Grade 1 anterolisthesis L5 over S1 with severe bilateral L5-S1 facet   arthrosis.  4. L4-L5 disc bulge, resulting in mild central canal, bilateral lateral   recess and mild bilateral neural foramen stenosis with facet arthrosis   and ligamentum flavum hypertrophy.  5. L5-S1 anterolisthesis with a disc bulge, facet arthrosis and   ligamentum flavum hypertrophy, resulting in severe central canal,   bilateral lateral recess and moderate bilateral neural foramen stenosis.   Right paracentral-foraminal annular tears.  6. Additional findings described in detail above.    < end of copied text >      Medical studies/laboratory studies reviewed.    Current Medications reviewed.    The patient was seen and examined at bedside.    ROS:  Constitutional: Denies fevers or chills  Eyes: Denies blurry vision or double vision  Ears/Nose/Mouth/Throat: Denies any pain on swallowing or dry mouth or runny nose  CV: Denies chest pain or palpitations  Respiratory: Denies cough or dyspnea  GI: Denies nausea, vomiting, abdominal pain  : Denies urinary frequency or dysuria  MSK: neck/ back pain  Neuro: Denies any headache or dizziness  Psychiatric: Denies depression or anxiety    PMHx: As above in HPI  Social Hx: No history of alcohol, tobacco, or illicit drug use.  Family History: Family history reviewed and found to be non pertinent to patients current disposition.    Physical Exam:     Constitutional: Gen: In no acute distress, cooperative with exam and questioning   Eyes: PERRL, no erythema of conjunctivae  Neck: No midline tenderness to palpation, supple  Ears/Nose/Mouth/Throat: Mucous membranes moist, no thrush, no rhinorrhea  CV: Regular rate and rhythm, S1 S2, no peripheral edema, pedal pulses intact  Resp: Good respiratory effort  GI: Nontender  Neuro: Cranial Nerves II-XII intact, sensation intact to light touch  Skin: No rashes, normal temperature on palpation  Psychiatric: Awake alert fully oriented    MSK: Cervical/Thoracic Spine Exam:  Inspection:  no erythema, no edema noted  Power:  Motor exam 5/5 grossly in upper extremities bilaterally  Range of Motion:  decreased range of motion due to pain   Palpation:  muscle spasm and tenderness to palpation across the cervical spine including in Trapezius,SCM, and cervical/ thoracic paraspinals bilaterally  Sensation:  sensation is intact bilaterally  Special Tests:  Spurling's test is negative bilaterally, Llhermite’s sign is negative, Positive facet loading.  Montelongo sign is negative       Lumbar Spine Exam:  Inspection:  no erythema, no edema   Palpation:  SIJ tenderness is negative, there is muscle spasm and tenderness to palpation across lumbar paraspinals bilaterally  Range of Motion:  decreased range of motion of lumbar spine secondary to pain   Power:  motor exam 5/5 throughout LE  Sensation:  sensation intact b/l  Reflexes:  DTR’s= symmetric in LE  Special Tests:  Straight leg raising test is negative bilaterally ,Donnie/MORRIS maneuver is negative, Positive Facet loading, Clonus negative

## 2023-05-17 NOTE — PROGRESS NOTE ADULT - SUBJECTIVE AND OBJECTIVE BOX
Patient seen and examined at bedside. No acute complaints at this time. Pain well controlled. Denies weakness, numbness or tingling. Denies chest pain, shortness of breath, nausea or vomiting.     PE:  Vital Signs Last 24 Hrs  T(C): 36.6 (05-17-23 @ 05:18), Max: 36.9 (05-16-23 @ 12:22)  T(F): 97.9 (05-17-23 @ 05:18), Max: 98.4 (05-16-23 @ 12:22)  HR: 69 (05-17-23 @ 05:18) (69 - 94)  BP: 130/74 (05-17-23 @ 05:18) (120/54 - 160/77)  BP(mean): --  RR: 18 (05-17-23 @ 05:18) (18 - 18)  SpO2: 95% (05-17-23 @ 05:18) (93% - 95%)    General: NAD, resting comforatbly in bed  No Midline/paraspinal TTP  No Bony Stepoffs  Neg grace  neg Babinski  Neg Clonus    Motor:                   C5                C6              C7               C8           T1   R             5/5                5/5            5/5              5/5          5/5  L             5/5                5/5            5/5              5/5          5/5                    L2                  L3             L4              L5            S1  R            5/5                5/5             5/5            5/5          5/5  L             5/5                5/5            5/5            5/5          5/5    Sensory:            C5         C6         C7      C8       T1        (0=absent, 1=impaired, 2=normal, NT=not testable)  R         2            2           2        2         2  L          2            2           2        2         2               L2          L3         L4      L5       S1         (0=absent, 1=impaired, 2=normal, NT=not testable)  R         2            2            2        2        2  L          2            2           2        2         2        A/P:  87y f w/ T11/L1/L4 VCF S/p MF.     PLAN:  * Pain control  * MRI of C-spine, T-spine and L-spine to determine any further pathology  * Treatment plan to be finalized after review of pending imaging studies  *LSO to be ordered  *admitted to medicine  *Discussed with Dr. Menezes who agrees with plan.  Patient seen and examined at bedside. No acute complaints at this time. Pain well controlled. Denies weakness, numbness or tingling. Denies chest pain, shortness of breath, nausea or vomiting.     PE:  Vital Signs Last 24 Hrs  T(C): 36.6 (05-17-23 @ 05:18), Max: 36.9 (05-16-23 @ 12:22)  T(F): 97.9 (05-17-23 @ 05:18), Max: 98.4 (05-16-23 @ 12:22)  HR: 69 (05-17-23 @ 05:18) (69 - 94)  BP: 130/74 (05-17-23 @ 05:18) (120/54 - 160/77)  BP(mean): --  RR: 18 (05-17-23 @ 05:18) (18 - 18)  SpO2: 95% (05-17-23 @ 05:18) (93% - 95%)    General: NAD, resting comfortably in bed  No Midline/paraspinal TTP  No Bony Stepoffs  Neg grace  neg Babinski  Neg Clonus    Motor:                   C5                C6              C7               C8           T1   R             5/5                5/5            5/5              5/5          5/5  L             5/5                5/5            5/5              5/5          5/5                    L2                  L3             L4              L5            S1  R            5/5                5/5             5/5            5/5          5/5  L             5/5                5/5            5/5            5/5          5/5    Sensory:            C5         C6         C7      C8       T1        (0=absent, 1=impaired, 2=normal, NT=not testable)  R         2            2           2        2         2  L          2            2           2        2         2               L2          L3         L4      L5       S1         (0=absent, 1=impaired, 2=normal, NT=not testable)  R         2            2            2        2        2  L          2            2           2        2         2        A/P:  87y f w/ T11/L1/L4 VCF S/p MF.     PLAN:    MRI C/T/Lsp reviewed w/ Dr. Menezes.   Pain control  LSO ordered. To Be worn w/ ambulation for comfort.   Ortho stable   No acute orthopedic surgical intervention at this time.   Follow up w/ Dr. Menezes in office. Please call office for appointment.   Discussed with Dr. Menezes who agrees with plan.

## 2023-05-17 NOTE — CONSULT NOTE ADULT - ASSESSMENT
Hyponatremia  Anemia  Hypertension  Back pain, LS spine compression fracture    Improving sodium levels. To continue current meds. Monitor BP trend. Titrate BP meds as needed.  Stable renal indices. Will follow electrolytes and renal function trend.     Further recommendations pending clinical course. Thank you for the courtesy of this referral.

## 2023-05-17 NOTE — CHART NOTE - NSCHARTNOTEFT_GEN_A_CORE
Pt seen at bedside to measure fit and deliver LSO for [id number] fractures  Pt measured and brace adjusted. Unable to stand to don orthosis.   Demonstration done for patient and relatives for yolie and lupe  Written instrructions left at bedside [facility] office contact info  Follow up if needed    Fortino [place] [employer]  [phone]

## 2023-05-17 NOTE — CONSULT NOTE ADULT - CONSULT REASON
Rib lesion M84. 48XA.  Pathologic fracture left lateral  sixth rib. Multiple vertebral body compression fractures.  D63.8 Anemia due to chronic disease  E87.1  Hyponatremia  I10  Hypertension  S22.000A  Back pain, LS spine compression fracture

## 2023-05-17 NOTE — PROGRESS NOTE ADULT - SUBJECTIVE AND OBJECTIVE BOX
Date of Service: 05-17-23 @ 12:15    Patient is a 87y old  Female who presents with a chief complaint of back pain (17 May 2023 06:51)      INTERVAL HPI/OVERNIGHT EVENTS: Patient seen and examined. NAD. + back pain with movement    Vital Signs Last 24 Hrs  T(C): 36.6 (17 May 2023 08:54), Max: 36.9 (16 May 2023 12:22)  T(F): 97.9 (17 May 2023 08:54), Max: 98.4 (16 May 2023 12:22)  HR: 68 (17 May 2023 08:54) (68 - 94)  BP: 125/62 (17 May 2023 08:54) (120/54 - 160/77)  BP(mean): --  RR: 18 (17 May 2023 08:54) (18 - 18)  SpO2: 98% (17 May 2023 08:54) (93% - 98%)    Parameters below as of 17 May 2023 05:18  Patient On (Oxygen Delivery Method): room air        05-17    131<L>  |  98  |  7   ----------------------------<  102<H>  3.5   |  27  |  0.60    Ca    10.2<H>      17 May 2023 05:29  Mg     2.1     05-17    TPro  8.1  /  Alb  3.5  /  TBili  0.4  /  DBili  x   /  AST  22  /  ALT  22  /  AlkPhos  107  05-16                          9.8    5.36  )-----------( 148      ( 17 May 2023 05:29 )             30.2       CAPILLARY BLOOD GLUCOSE                  acetaminophen     Tablet .. 650 milliGRAM(s) Oral every 6 hours PRN  aluminum hydroxide/magnesium hydroxide/simethicone Suspension 30 milliLiter(s) Oral every 4 hours PRN  cyclobenzaprine 5 milliGRAM(s) Oral three times a day  melatonin 3 milliGRAM(s) Oral at bedtime PRN  metoprolol tartrate 25 milliGRAM(s) Oral every 12 hours  ondansetron Injectable 4 milliGRAM(s) IV Push every 6 hours PRN  oxyCODONE    IR 5 milliGRAM(s) Oral every 4 hours PRN  senna 2 Tablet(s) Oral at bedtime  traMADol 50 milliGRAM(s) Oral every 6 hours PRN              REVIEW OF SYSTEMS:  CONSTITUTIONAL: No fever, no weight loss, or no fatigue  NECK: No pain, no stiffness  RESPIRATORY: No cough, no wheezing, no chills, no hemoptysis, No shortness of breath  CARDIOVASCULAR: No chest pain, no palpitations, no dizziness, no leg swelling  GASTROINTESTINAL: No abdominal pain. No nausea, no vomiting, no hematemesis; No diarrhea, no constipation. No melena, no hematochezia.  GENITOURINARY: No dysuria, no frequency, no hematuria, no incontinence  NEUROLOGICAL: No headaches, no loss of strength, no numbness, no tremors  SKIN: No itching, no burning  MUSCULOSKELETAL: No joint pain, no swelling; No muscle, + back pain, no extremity pain  PSYCHIATRIC: No depression, no mood swings,   HEME/LYMPH: No easy bruising, no bleeding gums  ALLERY AND IMMUNOLOGIC: No hives       Consultant(s) Notes Reviewed:  [X] YES  [ ] NO    PHYSICAL EXAM:  GENERAL: NAD  HEAD:  Atraumatic, Normocephalic  EYES: EOMI, PERRLA, conjunctiva and sclera clear  ENMT: No tonsillar erythema, exudates, or enlargement; Moist mucous membranes  NECK: Supple, No JVD  NERVOUS SYSTEM:  Awake & alert  CHEST/LUNG: Clear to auscultation bilaterally; No rales, rhonchi, wheezing,  HEART: Regular rate and rhythm  ABDOMEN: Soft, Nontender, Nondistended; Bowel sounds present  EXTREMITIES:  No clubbing, cyanosis, or edema  LYMPH: No lymphadenopathy noted  SKIN: No rashes      Advanced care planning discussed with patient/family [X] YES   [ ] NO    Advanced care planning discussed with patient/family. Patient's health status was discussed. All appropriate changes have been made regarding patient's end-of-life care. Advanced care planning forms reviewed/discussed/completed.  20 minutes spent.

## 2023-05-17 NOTE — PATIENT PROFILE ADULT - FUNCTIONAL ASSESSMENT - DAILY ACTIVITY 3.
4 = No assist / stand by assistance
Right ear hearing screen completed date: 2022  Right ear screen method: ABR (auditory brainstem response)  Right ear screen result: Passed  Right ear screen comment: N/A    Left ear hearing screen completed date: 2022  Left ear screen method: ABR (auditory brainstem response)  Left ear screen result: Passed  Left ear screen comments: N/A

## 2023-05-17 NOTE — CONSULT NOTE ADULT - ASSESSMENT
Hyponatremia  Anemia  Hypertension  Back pain, LS spine compression fracture    Improving sodium levels. To continue current meds. Monitor BP trend. Titrate BP meds as needed.  Stable renal indices. Will follow electrolytes and renal function trend.       [ASSESSMENT and  PLAN]      Patient is a 87y old  Female who presents with a chief complaint of back pain   Closed compression fracture of lumbosacral spine.       RECOMMENDATIONS  Transfuse PRBC as clinically indicated.   Transfuse PRBC if Hgb <7.0 or if symptomatic.   Follow CBC    Check Anemia studies.     DVT Prophyalxis    Thank you for consulting us.     Discussed plan of care with patient and or family in detail.   They expressed understanding of the treatment plan.   Risks, benefits and alternatives discussed in detail.   Opportunity given for questions and discussion.   Any questions or concerns all addressed and answered to their satisfaction, and in lay terms.     Discussed with  xxxxxxx.    > xxxxxx minutes spent in direct patient care, examining and counseling patient,  reviewing  the notes, lab data/ imaging , discussion with multidisciplinary team.     al.  [ASSESSMENT and  PLAN]  M84. 48XA.  Pathologic fracture left lateral  sixth rib. Multiple vertebral body compression fractures.  D63.8 Anemia due to chronic disease  E87.1  Hyponatremia  I10  Hypertension  S22.000A  Back pain, LS spine compression fracture    Patient is a 87y old  Female who presents with a chief complaint of back pain   Closed compression fracture of lumbosacral spine.   Pathologic fracture left lateral  sixth rib. Multiple vertebral body compression fractures.    Improving sodium levels. To continue current meds. Monitor BP trend. Titrate BP meds as needed.  Stable renal indices. Will follow electrolytes and renal function trend.     RECOMMENDATIONS  Transfuse PRBC as clinically indicated.   Transfuse PRBC if Hgb <7.0 or if symptomatic.   Follow CBC    Check Anemia studies.   Check MM studies  Check ESR and CRP    Consideration for Rib bx    DVT Prophylaxis    Thank you for consulting us.     Discussed plan of care with patient   Pt expressed understanding of the treatment plan.   Risks, benefits and alternatives discussed in detail.   Opportunity given for questions and discussion.   Any questions or concerns all addressed and answered to their satisfaction, and in lay terms.     Discussed with Dr Norris.

## 2023-05-18 ENCOUNTER — TRANSCRIPTION ENCOUNTER (OUTPATIENT)
Age: 88
End: 2023-05-18

## 2023-05-18 DIAGNOSIS — E87.1 HYPO-OSMOLALITY AND HYPONATREMIA: ICD-10-CM

## 2023-05-18 DIAGNOSIS — R42 DIZZINESS AND GIDDINESS: ICD-10-CM

## 2023-05-18 LAB
24R-OH-CALCIDIOL SERPL-MCNC: 31 NG/ML — SIGNIFICANT CHANGE UP (ref 30–80)
ALBUMIN SERPL ELPH-MCNC: 3.2 G/DL — LOW (ref 3.3–5)
ALP SERPL-CCNC: 97 U/L — SIGNIFICANT CHANGE UP (ref 40–120)
ALT FLD-CCNC: 20 U/L — SIGNIFICANT CHANGE UP (ref 12–78)
ANION GAP SERPL CALC-SCNC: 5 MMOL/L — SIGNIFICANT CHANGE UP (ref 5–17)
AST SERPL-CCNC: 17 U/L — SIGNIFICANT CHANGE UP (ref 15–37)
BILIRUB SERPL-MCNC: 0.3 MG/DL — SIGNIFICANT CHANGE UP (ref 0.2–1.2)
BUN SERPL-MCNC: 14 MG/DL — SIGNIFICANT CHANGE UP (ref 7–23)
CALCIUM SERPL-MCNC: 10.3 MG/DL — SIGNIFICANT CHANGE UP (ref 8.4–10.5)
CALCIUM SERPL-MCNC: 9.9 MG/DL — SIGNIFICANT CHANGE UP (ref 8.5–10.1)
CHLORIDE SERPL-SCNC: 99 MMOL/L — SIGNIFICANT CHANGE UP (ref 96–108)
CO2 SERPL-SCNC: 25 MMOL/L — SIGNIFICANT CHANGE UP (ref 22–31)
CREAT SERPL-MCNC: 0.7 MG/DL — SIGNIFICANT CHANGE UP (ref 0.5–1.3)
CRP SERPL-MCNC: 5 MG/L — HIGH
EGFR: 84 ML/MIN/1.73M2 — SIGNIFICANT CHANGE UP
ERYTHROCYTE [SEDIMENTATION RATE] IN BLOOD: 97 MM/HR — HIGH (ref 0–20)
FERRITIN SERPL-MCNC: 72 NG/ML — SIGNIFICANT CHANGE UP (ref 15–150)
FOLATE SERPL-MCNC: 10.2 NG/ML — SIGNIFICANT CHANGE UP
GLUCOSE SERPL-MCNC: 109 MG/DL — HIGH (ref 70–99)
IGA FLD-MCNC: 1140 MG/DL — HIGH (ref 84–499)
IGG FLD-MCNC: 336 MG/DL — LOW (ref 610–1660)
IGM SERPL-MCNC: 49 MG/DL — SIGNIFICANT CHANGE UP (ref 35–242)
IRON SATN MFR SERPL: 22 % — SIGNIFICANT CHANGE UP (ref 14–50)
IRON SATN MFR SERPL: 57 UG/DL — SIGNIFICANT CHANGE UP (ref 30–160)
KAPPA LC SER QL IFE: 0.72 MG/DL — SIGNIFICANT CHANGE UP (ref 0.33–1.94)
KAPPA LC SER QL IFE: 0.72 MG/DL — SIGNIFICANT CHANGE UP (ref 0.33–1.94)
KAPPA/LAMBDA FREE LIGHT CHAIN RATIO, SERUM: 0 RATIO — LOW (ref 0.26–1.65)
KAPPA/LAMBDA FREE LIGHT CHAIN RATIO, SERUM: 0 RATIO — LOW (ref 0.26–1.65)
LAMBDA LC SER QL IFE: 189.22 MG/DL — HIGH (ref 0.57–2.63)
LAMBDA LC SER QL IFE: 189.22 MG/DL — HIGH (ref 0.57–2.63)
POTASSIUM SERPL-MCNC: 4.4 MMOL/L — SIGNIFICANT CHANGE UP (ref 3.5–5.3)
POTASSIUM SERPL-SCNC: 4.4 MMOL/L — SIGNIFICANT CHANGE UP (ref 3.5–5.3)
PROT SERPL-MCNC: 7.2 G/DL — SIGNIFICANT CHANGE UP (ref 6–8.3)
PROT SERPL-MCNC: 7.2 G/DL — SIGNIFICANT CHANGE UP (ref 6–8.3)
PROT SERPL-MCNC: 7.7 G/DL — SIGNIFICANT CHANGE UP (ref 6–8.3)
PTH-INTACT FLD-MCNC: 25 PG/ML — SIGNIFICANT CHANGE UP (ref 15–65)
RBC # BLD: 3.59 M/UL — LOW (ref 3.8–5.2)
RETICS #: 43.8 K/UL — SIGNIFICANT CHANGE UP (ref 25–125)
RETICS/RBC NFR: 1.2 % — SIGNIFICANT CHANGE UP (ref 0.5–2.5)
SODIUM SERPL-SCNC: 129 MMOL/L — LOW (ref 135–145)
TIBC SERPL-MCNC: 261 UG/DL — SIGNIFICANT CHANGE UP (ref 220–430)
UIBC SERPL-MCNC: 205 UG/DL — SIGNIFICANT CHANGE UP (ref 110–370)
VIT B12 SERPL-MCNC: >2000 PG/ML — HIGH (ref 232–1245)
VIT D25+D1,25 OH+D1,25 PNL SERPL-MCNC: 24.9 PG/ML — SIGNIFICANT CHANGE UP (ref 19.9–79.3)

## 2023-05-18 RX ORDER — SODIUM CHLORIDE 9 MG/ML
1 INJECTION INTRAMUSCULAR; INTRAVENOUS; SUBCUTANEOUS ONCE
Refills: 0 | Status: COMPLETED | OUTPATIENT
Start: 2023-05-18 | End: 2023-05-18

## 2023-05-18 RX ORDER — CYCLOBENZAPRINE HYDROCHLORIDE 10 MG/1
5 TABLET, FILM COATED ORAL THREE TIMES A DAY
Refills: 0 | Status: DISCONTINUED | OUTPATIENT
Start: 2023-05-18 | End: 2023-05-18

## 2023-05-18 RX ORDER — LOSARTAN POTASSIUM 100 MG/1
12.5 TABLET, FILM COATED ORAL DAILY
Refills: 0 | Status: DISCONTINUED | OUTPATIENT
Start: 2023-05-19 | End: 2023-05-20

## 2023-05-18 RX ADMIN — Medication 650 MILLIGRAM(S): at 06:00

## 2023-05-18 RX ADMIN — SODIUM CHLORIDE 1 GRAM(S): 9 INJECTION INTRAMUSCULAR; INTRAVENOUS; SUBCUTANEOUS at 17:33

## 2023-05-18 RX ADMIN — Medication 25 MILLIGRAM(S): at 17:33

## 2023-05-18 RX ADMIN — CYCLOBENZAPRINE HYDROCHLORIDE 5 MILLIGRAM(S): 10 TABLET, FILM COATED ORAL at 05:21

## 2023-05-18 RX ADMIN — Medication 650 MILLIGRAM(S): at 05:27

## 2023-05-18 NOTE — PHYSICAL THERAPY INITIAL EVALUATION ADULT - ADDITIONAL COMMENTS
pt. reports PLOF as independent with rollator with all ADLs. pt. has no stairs to perform at home. pt. lives with daughter in private home.

## 2023-05-18 NOTE — CARE COORDINATION ASSESSMENT. - NSPASTMEDSURGHISTORY_GEN_ALL_CORE_FT
PAST MEDICAL & SURGICAL HISTORY:  GERD (gastroesophageal reflux disease)      Hypertension      Ankle fracture, left      Ankle fracture, left  1980 (Hardware placed)      Regurgitation of food      Back pain with sciatica  Sciatica affects LEFT Side      Dyspnea on exertion      Chronic low back pain      Chronic cough      Benign essential hypertension      Gastro-esophageal reflux disease without esophagitis      History of tonsillectomy  As a child      H/O colonoscopy      H/O endoscopy

## 2023-05-18 NOTE — CARE COORDINATION ASSESSMENT. - OTHER PERTINENT DISCHARGE PLANNING INFORMATION:
Patient admitted for back pain and has Closed compression fracture of lumbosacral spine. Lives with daughter. Has no steps. Uses a RW to ambulate. Independent and needs PT for DC. CM role explained and DC planning discussed.

## 2023-05-18 NOTE — PROGRESS NOTE ADULT - SUBJECTIVE AND OBJECTIVE BOX
Interval History:  currently comfortable  Chart reviewed and events noted;   Overnight events:    MEDICATIONS  (STANDING):  metoprolol tartrate 25 milliGRAM(s) Oral every 12 hours  senna 2 Tablet(s) Oral at bedtime  sodium chloride 1 Gram(s) Oral once    MEDICATIONS  (PRN):  acetaminophen     Tablet .. 650 milliGRAM(s) Oral every 6 hours PRN Temp greater or equal to 38C (100.4F), Mild Pain (1 - 3)  aluminum hydroxide/magnesium hydroxide/simethicone Suspension 30 milliLiter(s) Oral every 4 hours PRN Dyspepsia  melatonin 3 milliGRAM(s) Oral at bedtime PRN Insomnia  ondansetron Injectable 4 milliGRAM(s) IV Push every 6 hours PRN Nausea and/or Vomiting  traMADol 50 milliGRAM(s) Oral every 6 hours PRN Moderate Pain (4 - 6)  traMADol 100 milliGRAM(s) Oral every 6 hours PRN Severe Pain (7 - 10)      Vital Signs Last 24 Hrs  T(C): 36.6 (18 May 2023 11:43), Max: 36.6 (18 May 2023 11:43)  T(F): 97.9 (18 May 2023 11:43), Max: 97.9 (18 May 2023 11:43)  HR: 73 (18 May 2023 11:43) (67 - 80)  BP: 120/62 (18 May 2023 11:43) (100/63 - 135/72)  BP(mean): --  RR: 19 (18 May 2023 11:43) (18 - 19)  SpO2: 93% (18 May 2023 11:43) (92% - 95%)    Parameters below as of 18 May 2023 11:43  Patient On (Oxygen Delivery Method): room air        PHYSICAL EXAM  General: adult in NAD  HEENT: clear oropharynx, anicteric sclera, pink conjunctivae  Neck: supple  CV: normal S1S2 with no murmur rubs or gallops  Lungs: clear to auscultation, no wheezes, no rhales  Abdomen: soft non-tender non-distended, no hepato/splenomegaly  Ext: no clubbing cyanosis or edema  Skin: no rashes and no petichiae  Neuro: alert and oriented X3 no focal deficits      LABS:  CBC Full  -  ( 18 May 2023 10:00 )  WBC Count : x  RBC Count : 3.59 M/uL  Hemoglobin : x  Hematocrit : x  Platelet Count - Automated : x  Mean Cell Volume : x  Mean Cell Hemoglobin : x  Mean Cell Hemoglobin Concentration : x  Auto Neutrophil # : x  Auto Lymphocyte # : x  Auto Monocyte # : x  Auto Eosinophil # : x  Auto Basophil # : x  Auto Neutrophil % : x  Auto Lymphocyte % : x  Auto Monocyte % : x  Auto Eosinophil % : x  Auto Basophil % : x    05-18    129<L>  |  99  |  14  ----------------------------<  109<H>  4.4   |  25  |  0.70    Ca    9.9      18 May 2023 05:40  Mg     2.1     05-17    TPro  7.2  /  Alb  3.2<L>  /  TBili  0.3  /  DBili  x   /  AST  17  /  ALT  20  /  AlkPhos  97  05-18        fe studies      WBC trend  5.36 K/uL (05-17-23 @ 05:29)  7.78 K/uL (05-16-23 @ 14:30)      Hgb trend  9.8 g/dL (05-17-23 @ 05:29)  10.5 g/dL (05-16-23 @ 14:30)      plt trend  148 K/uL (05-17-23 @ 05:29)  143 K/uL (05-16-23 @ 14:30)        RADIOLOGY & ADDITIONAL STUDIES:    < from: MR Lumbar Spine No Cont (05.17.23 @ 13:02) >  ACC: [id number] EXAM:  MR SPINE THORACIC   ORDERED BY: DARLIN MAO [facility]     ACC: [id number] EXAM:  MR SPINE LUMBAR   ORDERED BY: DARLIN MAO [facility]     ACC: [id number] EXAM:  MR SPINE CERVICAL   ORDERED BY: DARLIN MAO [facility]     PROCEDURE DATE:  05/17/2023          INTERPRETATION:  CLINICAL STATEMENT: Pathologic fracture left lateral   sixth rib. Multiple vertebral body compression fractures.    TECHNIQUE: Multiplanar multisequence noncontrast MRI cervical, thoracic   and lumbar spine. Multiple images degraded by motion artifact.  COMPARISON: CT chest, abdomen and lumbar spine 5/16/2023.    FINDINGS:    MRI CERVICAL    No cervical compression fracture, spondylolisthesis or evidence of marrow   replacement process. No visualized intrathecal mass.    Findings most concordant with multiple lower cervical and upper thoracic   perineural cysts, measuring up to 1.5 cm at the right T1-T2 level.   Approximate 2.5 cm left thyroid mass incidentally noted.    Diffuse disc desiccation with multilevel endplate and uncovertebral   spurring.    C2-C3: Disc bulge, without stenosis.  C3-C4: Disc bulge, without stenosis.  C4-C5: Disc bulge, without stenosis.  C5-C6: Disc bulge, effacing the subarachnoid space and flattening the   ventral cord, resulting in mild central canal stenosis.  C6-C7: Disc bulge, without stenosis.  C7-T1: No disc herniation or stenosis.    MRI THORACIC    No visualized pleural fluid collection. No gross intrathecal mass however   suboptimally evaluated secondary to motion artifact and CSF pulsation   phenomenon. Several subcentimeter scattered perineural cysts thoracic   spine.    Rightward curvature thoracic spine. Varying degrees of diffuse disc   desiccation with multilevel endplate spurring. Evidence of multilevel   lower thoracic facet arthrosis. Multilevel thoracic disc bulges. No   thoracic disc herniation.    Severe T2 compression fracture, demonstrating up to approximately 4 mm   retropulsion, effacing the subarachnoid space and flattening the ventral   cord. No associatedmarrow edema.    Mild T11 superior endplate compression deformity with faint fracture line   and mild superior endplate marrow edema. No retropulsion.    No visualized epidural or paraspinal soft tissue mass in the above   regions.    MRI LUMBAR    Nonspecific edema posterior paraspinal subcutaneous fat. Prominent   bladder volume.    Leftward curvature. Grade 1 anterolisthesis L5 over S1 with severe   bilateral facet arthrosis at this level. Varying degrees of diffuse disc   desiccation with multilevel endplate spurring, facet arthrosis and   ligamentum flavum hypertrophy.    Severe L1 compression fracture, demonstrating mild marrow edema and up to   approximately 3 mm retropulsion, impinging upon the thecal sac.    Moderate-severe L4 compression fracture, most pronounced centrally,   demonstrating mild associated marrow edema. No retropulsion.    No visualized epidural or paraspinal soft tissue mass in the above   regions.    L1-L2: Disc bulge, without stenosis.  L2-L3: No disc herniation or stenosis.  L3-L4: Disc bulge, resulting in mild bilateral neural foramen stenosis   with facet arthrosis.  L4-L5: Disc bulge, resulting in mild central canal, bilateral lateral   recess and mild bilateral neural foramen stenosis with facet arthrosis   and ligamentum flavum hypertrophy.  L5-S1: Aforementioned anterolisthesis with a disc bulge, facet arthrosis   and ligamentum flavum hypertrophy, resulting in severe central canal,   bilateral lateral recess and moderate bilateral neural foramen stenosis.   Right paracentral-foraminal annular tears.    IMPRESSION:    MRI CERVICAL  1. No cervical compression fracture.  2. C5-C6 disc bulge, flattening the ventral cord and resulting in mild   central canal stenosis. No cervical disc herniation.  3. Additional cervical findings, described in detail above, including a   2.5 cm left thyroid mass. Recommend thyroid ultrasound, as this is of   indeterminate neoplastic-malignant potential.    MRI THORACIC  1. Chronic severe T2 compression fracture demonstrating up to 4 mm   retropulsion, effacing the subarachnoid space and flattening the ventral   cord.  2. Mild, probable subacute T11 superior endplate compression fracture,   without retropulsion.  3. Additional findings described in detail above.    MRI LUMBAR  1. Severe, probable subacute L1 compression fracture demonstrating up to   3 mm retropulsion, impinging upon the thecal sac.  2. Moderate-severe, probable subacute L4 compression fracture, without   associated retropulsion.  3. Grade 1 anterolisthesis L5 over S1 with severe bilateral L5-S1 facet   arthrosis.  4. L4-L5 disc bulge, resulting in mild central canal, bilateral lateral   recess and mild bilateral neural foramen stenosis with facet arthrosis   and ligamentum flavum hypertrophy.  5. L5-S1 anterolisthesis with a disc bulge, facet arthrosis and   ligamentum flavum hypertrophy, resulting in severe central canal,   bilateral lateral recess and moderate bilateral neural foramen stenosis.   Right paracentral-foraminal annular tears.  6. Additional findings described in detail above.    --- End of Report ---            WILFREDO HINTON M.D., ATTENDING RADIOLOGIST  This document has been electronically signed. May 17 2023  2:29PM    < end of copied text >

## 2023-05-18 NOTE — PHYSICAL THERAPY INITIAL EVALUATION ADULT - PERTINENT HX OF CURRENT PROBLEM, REHAB EVAL
This is a 88 y/o female h/o chronic sciatica, hypertension, chronic low back pain presents to the ED with c/o lower back pain chronically but worsened a few days ago.  Patient reports when she moves she feels a spasm in her back sometimes the spasm is so bad it radiates to the front of her abdomen.  She denies any fall or trauma, nausea vomiting diarrhea, urinary symptoms, fever chills or all other complaints. In the ER, CT shows multiple compression fractures and L rib lesion.

## 2023-05-18 NOTE — PROGRESS NOTE ADULT - SUBJECTIVE AND OBJECTIVE BOX
Date of Service: 05-18-23 @ 12:50    Patient is a 87y old  Female who presents with a chief complaint of back pain (17 May 2023 18:41)      INTERVAL HPI/OVERNIGHT EVENTS: Patient seen and examined. NAD. Feels dizzy.    Vital Signs Last 24 Hrs  T(C): 36.6 (18 May 2023 11:43), Max: 36.6 (18 May 2023 11:43)  T(F): 97.9 (18 May 2023 11:43), Max: 97.9 (18 May 2023 11:43)  HR: 73 (18 May 2023 11:43) (67 - 80)  BP: 120/62 (18 May 2023 11:43) (100/63 - 135/72)  BP(mean): --  RR: 19 (18 May 2023 11:43) (18 - 19)  SpO2: 93% (18 May 2023 11:43) (92% - 95%)    Parameters below as of 18 May 2023 11:43  Patient On (Oxygen Delivery Method): room air        05-18    129<L>  |  99  |  14  ----------------------------<  109<H>  4.4   |  25  |  0.70    Ca    9.9      18 May 2023 05:40  Mg     2.1     05-17    TPro  7.2  /  Alb  3.2<L>  /  TBili  0.3  /  DBili  x   /  AST  17  /  ALT  20  /  AlkPhos  97  05-18                          9.8    5.36  )-----------( 148      ( 17 May 2023 05:29 )             30.2       CAPILLARY BLOOD GLUCOSE    < from: MR Lumbar Spine No Cont (05.17.23 @ 13:02) >    ACC: [id number] EXAM:  MR SPINE THORACIC   ORDERED BY: DARLIN MAO [facility]     ACC: [id number] EXAM:  MR SPINE LUMBAR   ORDERED BY: DARLIN MAO [facility]     ACC: [id number] EXAM:  MR SPINE CERVICAL   ORDERED BY: DARLIN SOSA     PROCEDURE DATE:  05/17/2023          INTERPRETATION:  CLINICAL STATEMENT: Pathologic fracture left lateral   sixth rib. Multiple vertebral body compression fractures.    TECHNIQUE: Multiplanar multisequence noncontrast MRI cervical, thoracic   and lumbar spine. Multiple images degraded by motion artifact.  COMPARISON: CT chest, abdomen and lumbar spine 5/16/2023.    FINDINGS:    MRI CERVICAL    No cervical compression fracture, spondylolisthesis or evidence of marrow   replacement process. No visualized intrathecal mass.    Findings most concordant with multiple lower cervical and upper thoracic   perineural cysts, measuring up to 1.5 cm at the right T1-T2 level.   Approximate 2.5 cm left thyroid mass incidentally noted.    Diffuse disc desiccation with multilevel endplate and uncovertebral   spurring.    C2-C3: Disc bulge, without stenosis.  C3-C4: Disc bulge, without stenosis.  C4-C5: Disc bulge, without stenosis.  C5-C6: Disc bulge, effacing the subarachnoid space and flattening the   ventral cord, resulting in mild central canal stenosis.  C6-C7: Disc bulge, without stenosis.  C7-T1: No disc herniation or stenosis.    MRI THORACIC    No visualized pleural fluid collection. No gross intrathecal mass however   suboptimally evaluated secondary to motion artifact and CSF pulsation   phenomenon. Several subcentimeter scattered perineural cysts thoracic   spine.    Rightward curvature thoracic spine. Varying degrees of diffuse disc   desiccation with multilevel endplate spurring. Evidence of multilevel   lower thoracic facet arthrosis. Multilevel thoracic disc bulges. No   thoracic disc herniation.    Severe T2 compression fracture, demonstrating up to approximately 4 mm   retropulsion, effacing the subarachnoid space and flattening the ventral   cord. No associatedmarrow edema.    Mild T11 superior endplate compression deformity with faint fracture line   and mild superior endplate marrow edema. No retropulsion.    No visualized epidural or paraspinal soft tissue mass in the above   regions.    MRI LUMBAR    Nonspecific edema posterior paraspinal subcutaneous fat. Prominent   bladder volume.    Leftward curvature. Grade 1 anterolisthesis L5 over S1 with severe   bilateral facet arthrosis at this level. Varying degrees of diffuse disc   desiccation with multilevel endplate spurring, facet arthrosis and   ligamentum flavum hypertrophy.    Severe L1 compression fracture, demonstrating mild marrow edema and up to   approximately 3 mm retropulsion, impinging upon the thecal sac.    Moderate-severe L4 compression fracture, most pronounced centrally,   demonstrating mild associated marrow edema. No retropulsion.    No visualized epidural or paraspinal soft tissue mass in the above   regions.    L1-L2: Disc bulge, without stenosis.  L2-L3: No disc herniation or stenosis.  L3-L4: Disc bulge, resulting in mild bilateral neural foramen stenosis   with facet arthrosis.  L4-L5: Disc bulge, resulting in mild central canal, bilateral lateral   recess and mild bilateral neural foramen stenosis with facet arthrosis   and ligamentum flavum hypertrophy.  L5-S1: Aforementioned anterolisthesis with a disc bulge, facet arthrosis   and ligamentum flavum hypertrophy, resulting in severe central canal,   bilateral lateral recess and moderate bilateral neural foramen stenosis.   Right paracentral-foraminal annular tears.    IMPRESSION:    MRI CERVICAL  1. No cervical compression fracture.  2. C5-C6 disc bulge, flattening the ventral cord and resulting in mild   central canal stenosis. No cervical disc herniation.  3. Additional cervical findings, described in detail above, including a   2.5 cm left thyroid mass. Recommend thyroid ultrasound, as this is of   indeterminate neoplastic-malignant potential.    MRI THORACIC  1. Chronic severe T2 compression fracture demonstrating up to 4 mm   retropulsion, effacing the subarachnoid space and flattening the ventral   cord.  2. Mild, probable subacute T11 superior endplate compression fracture,   without retropulsion.  3. Additional findings described in detail above.    MRI LUMBAR  1. Severe, probable subacute L1 compression fracture demonstrating up to   3 mm retropulsion, impinging upon the thecal sac.  2. Moderate-severe, probable subacute L4 compression fracture, without   associated retropulsion.  3. Grade 1 anterolisthesis L5 over S1 with severe bilateral L5-S1 facet   arthrosis.  4. L4-L5 disc bulge, resulting in mild central canal, bilateral lateral   recess and mild bilateral neural foramen stenosis with facet arthrosis   and ligamentum flavum hypertrophy.  5. L5-S1 anterolisthesis with a disc bulge, facet arthrosis and   ligamentum flavum hypertrophy, resulting in severe central canal,   bilateral lateral recess and moderate bilateral neural foramen stenosis.   Right paracentral-foraminal annular tears.  6. Additional findings described in detail above.    --- End of Report ---            WILFREDO HINTON M.D., ATTENDING RADIOLOGIST  This document has been electronically signed. May 17 2023  2:29PM    < end of copied text >  < from: NM Bone Imaging Total (05.17.23 @ 15:40) >    ACC: [id number] EXAM:  NM BONE IMG WHOLE BODY   ORDERED BY: ASHER PARHAM     PROCEDURE DATE:  05/17/2023          INTERPRETATION:  CLINICAL INFORMATION: Left 6th rib lesion. Back pain.   Abdominal Pain. Evaluate for bone metastasis.    RADIOPHARMACEUTICAL: 20 mCi Tc-99m MDP, I.V.    TECHNIQUE: Anterior and posterior whole body images were obtained 2-3   hours following administration of radiopharmaceutical.    COMPARISON: None    OTHER STUDIES USED FOR CORRELATION: MRI cervical, thoracic and lumbar   spine 05/17/2023 and CT CAP 05/16/2023.    FINDINGS:    Heterogeneous uptake along the cervicothoracolumbar spine most consistent   with degenerative changes and compression fractures.    Otherwise degenerative changes are also noted in both shoulders, elbows,   hands, knees and feet.    Single rib focus is noted at the left lateral 6th rib.    No additional suspicious lesions.    Both kidneys are visualized.    The bladder is distended even after voiding which limits accurate   evaluation of the bony pelvis.    IMPRESSION: No definite scintigraphic evidence of osseous metastasis.    Indeterminate single rib focus at the left lateral 6th rib.    --- End of Report ---             ROBINSON PRADO MD; Attending Nuclear Medicine  This document has been electronically signed. May 17 2023  6:04PM    < end of copied text >                acetaminophen     Tablet .. 650 milliGRAM(s) Oral every 6 hours PRN  aluminum hydroxide/magnesium hydroxide/simethicone Suspension 30 milliLiter(s) Oral every 4 hours PRN  melatonin 3 milliGRAM(s) Oral at bedtime PRN  metoprolol tartrate 25 milliGRAM(s) Oral every 12 hours  ondansetron Injectable 4 milliGRAM(s) IV Push every 6 hours PRN  senna 2 Tablet(s) Oral at bedtime  sodium chloride 1 Gram(s) Oral once  traMADol 50 milliGRAM(s) Oral every 6 hours PRN  traMADol 100 milliGRAM(s) Oral every 6 hours PRN              REVIEW OF SYSTEMS:  CONSTITUTIONAL: No fever, no weight loss, or no fatigue  NECK: No pain, no stiffness  RESPIRATORY: No cough, no wheezing, no chills, no hemoptysis, No shortness of breath  CARDIOVASCULAR: No chest pain, no palpitations, no dizziness, no leg swelling  GASTROINTESTINAL: No abdominal pain. No nausea, no vomiting, no hematemesis; No diarrhea, no constipation. No melena, no hematochezia.  GENITOURINARY: No dysuria, no frequency, no hematuria, no incontinence  NEUROLOGICAL: No headaches, no loss of strength, no numbness, no tremors; +dizzy  SKIN: No itching, no burning  MUSCULOSKELETAL: No joint pain, no swelling; No muscle, no back, no extremity pain  PSYCHIATRIC: No depression, no mood swings,   HEME/LYMPH: No easy bruising, no bleeding gums  ALLERY AND IMMUNOLOGIC: No hives       Consultant(s) Notes Reviewed:  [X] YES  [ ] NO    PHYSICAL EXAM:  GENERAL: NAD  HEAD:  Atraumatic, Normocephalic  EYES: EOMI, PERRLA, conjunctiva and sclera clear  ENMT: No tonsillar erythema, exudates, or enlargement; Moist mucous membranes  NECK: Supple, No JVD  NERVOUS SYSTEM:  Awake & alert  CHEST/LUNG: Clear to auscultation bilaterally; No rales, rhonchi, wheezing,  HEART: Regular rate and rhythm  ABDOMEN: Soft, Nontender, Nondistended; Bowel sounds present  EXTREMITIES:  No clubbing, cyanosis, or edema  LYMPH: No lymphadenopathy noted  SKIN: No rashes      Advanced care planning discussed with patient/family [X] YES   [ ] NO    Advanced care planning discussed with patient/family. Patient's health status was discussed. All appropriate changes have been made regarding patient's end-of-life care. Advanced care planning forms reviewed/discussed/completed.  20 minutes spent.

## 2023-05-18 NOTE — CARE COORDINATION ASSESSMENT. - NSCAREPROVIDERS_GEN_ALL_CORE_FT
CARE PROVIDERS:  Accepting Physician: Rajan Norris  Access Services: Lolis Mitchell  Administration: Juan, Francisco  Administration: Ashlee Gary  Administration: Sherine Etienne  Administration: Hubert Chu  Administration: Castillo Monterroso  Admitting: Rajan Norris  Attending: Rajan Norris  Case Management: Soo Vera  Consultant: Fortino Huerta  Consultant: Hon Jaswinder  Consultant: Panda Napoles  ED ACP: Claribel Santos  ED Attending: Luisito Ch  ED Nurse: Herman Garcia  Nurse: Joslyn Jacinto  Nurse: Brenda Lewis  Ordered: ADM, User  Ordered: Mikhail Brandt  Outpatient Provider: Mikhail Brandt  Outpatient Provider: Uriah Tripathi  Outpatient Provider: Jossue Swanson  Override: Sofi Almanza  PCA/Nursing Assistant: Christy Coronado  PCA/Nursing Assistant: Mariza Ortiz  Primary Team: Charli Mayes  Primary Team: Ki Martínez  Registered Dietitian: Kasie Haines  Respiratory Therapy: Alfonso Ceja  : Jimena Machado  Team: [facility], Team

## 2023-05-18 NOTE — DISCHARGE NOTE PROVIDER - HOSPITAL COURSE
This is a 86 y/o female h/o chronic sciatica, hypertension, chronic low back pain presents to the ED with c/o lower back pain chronically but worsened 3 to 4 days ago.  Patient reports when she moves she feels a spasm in her back sometimes the spasm is so bad it radiates to the front of her abdomen.  She denies any fall or trauma, nausea vomiting diarrhea, urinary symptoms, fever chills or all other complaints. In the ER, CT shows multiple compression fractures and ?L rib lesion.    Patient found to have multiple compression fractures on CT/MRI  +L 6th rib lesion -- suspicious for malignancy  Seen by heme/onc -- needs outpatient MM w/u with biopsy  Pain improved with brace and pain meds    >35 minutes spent on discharge

## 2023-05-18 NOTE — DISCHARGE NOTE PROVIDER - NSDCMRMEDTOKEN_GEN_ALL_CORE_FT
gabapentin 100 mg oral tablet: 1 tab(s) orally 2 times a day  losartan 25 mg oral tablet: 0.5 tab(s) orally once a day   gabapentin 100 mg oral tablet: 1 tab(s) orally 2 times a day  losartan 25 mg oral tablet: 0.5 tab(s) orally once a day  traMADol 50 mg oral tablet: 1 tab(s) orally every 6 hours as needed for  severe pain MDD: 4   losartan 25 mg oral tablet: 0.5 tab(s) orally once a day  traMADol 50 mg oral tablet: 1 tab(s) orally every 6 hours as needed for  severe pain MDD: 4

## 2023-05-18 NOTE — PHYSICAL THERAPY INITIAL EVALUATION ADULT - PHYSICAL ASSIST/NONPHYSICAL ASSIST: GAIT, REHAB EVAL
[facility] ambulatory encounter  INTERNAL MEDICINE MALE ANNUAL PHYSICAL EXAM    CHIEF COMPLAINT:  Establish Care       HISTORY OF PRESENT ILLNESS:    Gabe Flores is a pleasant 62 year old male who presents today for an annual physical exam.    Concerns discussed include:   1. HTN - on nifedipine, triamterene - hctz and quinapril   No edema , Bp well controlled   Discussed salt restriction , wt loss, and home monitoring   2. Hypothyroidism  - was on levothyroxine 75 mcg daily - seen by Dr Davey - last TSH June 2017 was elevated - medication adjusted  - will recheck in 6 weeks   No symptoms s/o hypo or hyperthyroidism   3. Asthma - mild persistent - on Symbicort - no active symptoms - non smoker   4. ED - on cialis - scripts given   5. H/o BPPV - no active symptoms today - pt would like to get PT for the same - discussed vestibular PT       PROBLEM LIST:    Patient Active Problem List   Diagnosis   • Unspecified hypothyroidism   • Essential hypertension, benign   • Unspecified asthma   • Open angle with borderline findings, low risk   • Presbyopia   • Impotence of organic origin   • Iatrogenic hypothyroidism       HISTORIES:    I have reviewed the past medical history, family history, social history, medications and allergies listed in the medical record as obtained by my nursing staff and support staff and agree with their documentation.    REVIEW OF SYSTEMS:    Constitutional:  No weight change.  No significant fatigue.  Eyes:  No visual disturbances.    HENT:  No hearing problems.  No ear pain.  No sore throat.   Respiratory:  No cough.  No wheezing.  No shortness of breath.    Cardiovascular:  No chest pain.  No palpitations.  No swelling.  Gastrointestinal:  No abdominal pain.  No frequent heartburn.  No nausea.  No vomiting.  No diarrhea.  No constipation.  No blood in stool.   Genitourinary:  No dysuria.  No frequency.  No hematuria.  No incontinence.   Extremities:  No significant joint swelling.  No joint  pain.  Skin:  No change in moles.  No rash.   Neurologic:  No weakness.  No numbness.  No headache.  No dizziness.     Endocrine:  No heat intolerance.  No cold intolerance.  Psychiatric:  No depression.  No appetite changes.  No changes in sleep.      PHYSICAL EXAM:  Vital Signs:    Visit Vitals  /84   Pulse 80   Resp 18   Ht 6' (1.829 m)   Wt 105.6 kg   SpO2 95%   BMI 31.57 kg/m²       Constitutional:  Well developed, well nourished, no acute distress.   Eyes:  Pupils equal, round, reactive to light and accommodation, extraocular movements intact. Conjunctivae pink.  Sclerae anicteric. HENT:  Normocephalic and atraumatic.  Bilateral external ears are normal.  On otoscopic exam, external auditory canals and tympanic membranes are within normal limits.  Mild cerumen right more than left ear canal , no impaction . External nose appears normal.  Nasal mucosa, septum and turbinates appear normal.  Oropharynx moist and within normal limits.  Lips and gums within normal limits.  Neck:  Supple, nontender.  Normal range of motion.  No masses.  No thyromegaly.  Trachea midline.    Respiratory:  Clear to auscultation and percussion bilaterally.  No wheezes, rales, rhonchi or crackles.  Good respiratory effort.  No retraction or accessory muscle use.  Symmetrical chest expansion.    Cardiovascular:  Regular rate and rhythm. No murmurs, rubs, or gallops.  Point of maximal impulse nondisplaced.  Normal S1 and S2.  No S3 or S4.  No jugular venous distension.  No carotid bruits.  Good dorsalis pedis pulses bilaterally.  No peripheral edema.  Gastrointestinal:  Soft, nontender, nondistended.  No rebound or guarding.  Normal bowel sounds.  No hepatomegaly.  No splenomegaly.  No pulsatile or other abdominal masses.  No hernias noted.    Genitourinary:  Normal external genitalia.  No inguinal hernias are noted.  No testicular masses or tenderness.  Phallus without lesions or discharge.    Rectal:  Normal tone.  No masses.   Prostate smooth, soft with no nodules or asymmetry.  Musculoskeletal:  No clubbing, cyanosis or edema.  Full range of motion in all 4 extremities proximal and distal.  No back tenderness.    Neurologic:  Alert and oriented x3.  Deep tendon reflexes 2+ in both upper and lower extremities.  Gait and station are normal.  Proximal and distal strength 5/5 in bilateral upper and lower extremities with normal tone.  No gross sensory deficits noted.  Cranial nerves II-XII intact.    Skin:  Warm and dry.  No rashes, lesions or subcutaneous masses.    Lymphatic:  No lymphadenopathy in submental, submandibular, or cervical chain.  No supraclavicular lymphadenopathy.  No axillary or inguinal lymphadenopathy.  Psychiatric:  The patient is cooperative and demonstrates good judgment, insight and affect.      LABORATORY DATA:    No lab tests performed today        ASSESSMENT:      PLAN:      Encounter to establish care with new doctor    Need for Tdap vaccination  - TETANUS/DIPHTHERIA/ACELLULAR PERTUSSIS 11+ (ADACEL)    Routine general medical examination at health care facility  - CBC & AUTO DIFFERENTIAL; Future    Essential hypertension, benign  - COMPREHENSIVE METABOLIC PANEL; Future  - quinapril (ACCUPRIL) 10 MG tablet; Take 1 tablet by mouth daily.  Dispense: 90 tablet; Refill: 1  - NIFEdipine (PROCARDIA XL) 60 MG 24 hr tablet; Take 1 tablet by mouth daily.  Dispense: 90 tablet; Refill: 1  - triamterene-hydrochlorothiazide (DYAZIDE) 37.5-25 MG per capsule; Take 1 capsule by mouth daily.  Dispense: 90 capsule; Refill: 1    Acquired hypothyroidism  - levothyroxine (SYNTHROID, LEVOTHROID) 75 MCG tablet; Take 1 tablet by mouth Monday through Saturday, Then take 2 tablets by mouth on Sunday.  Dispense: 120 tablet; Refill: 1    Mild intermittent asthma without complication  - budesonide-formoterol (SYMBICORT) 160-4.5 MCG/ACT inhaler; Inhale 2 puffs into the lungs 2 times daily.  Dispense: 3 Inhaler; Refill: 1    Screening for  hyperlipidemia  - LIPID PANEL WITHOUT REFLEX; Future    Encounter for colorectal cancer screening  - OPEN ACCESS COLONOSCOPY    Erectile dysfunction, unspecified erectile dysfunction type  - tadalafil (CIALIS) 20 MG tablet; Take 1 tablet by mouth as needed for Erectile Dysfunction.  Dispense: 90 tablet; Refill: 1    BPPV (benign paroxysmal positional vertigo), bilateral  - SERVICE TO PHYSICAL THERAPY      Return in about 6 months (around 2/5/2018) for Blood pressure management- GET LABS 2 DAYS BEFORE VISIT .    Instructions provided as documented in the after visit summary.    The patient indicated understanding of the diagnosis and agreed with the plan of care.           1 person assist

## 2023-05-18 NOTE — DISCHARGE NOTE PROVIDER - CARE PROVIDER_API CALL
Hon SILVIA San (MD)  Hematology; Internal Medicine; Medical Oncology  [address], [address]  [place] [address]  Phone: [phone]  Fax: [phone]  Follow Up Time: 1 week    primary care doctor,   Phone: (   )    -  Fax: (   )    -  Follow Up Time: 1 week    Sandrita Menezes)  Orthopaedic Surgery Surgery  [address] [place] [address]  Phone: [phone]  Fax: [phone]  Follow Up Time: 2 weeks   Hon SILVIA San (MD)  Hematology; Internal Medicine; Medical Oncology  [address], [address]  [place] [address]  Phone: [phone]  Fax: [phone]  Established Patient  Follow Up Time: 1-3 days    Sandrita Menezes)  Orthopaedic Surgery Surgery  [address] [place] [address]  Phone: [phone]  Fax: [phone]  Follow Up Time: 2 weeks    primary care doctor,   Phone: (   )    -  Fax: (   )    -  Follow Up Time: 1 week

## 2023-05-18 NOTE — DISCHARGE NOTE PROVIDER - CARE PROVIDERS DIRECT ADDRESSES
,DirectAddress_Unknown,DirectAddress_Unknown,[email or web address] ,DirectAddress_Unknown,[email or web address],DirectAddress_Unknown

## 2023-05-18 NOTE — DISCHARGE NOTE PROVIDER - PROVIDER TOKENS
PROVIDER:[TOKEN:[[id number]],FOLLOWUP:[1 week]],FREE:[LAST:[primary care doctor],PHONE:[(   )    -],FAX:[(   )    -],FOLLOWUP:[1 week]],PROVIDER:[TOKEN:[[id number]],FOLLOWUP:[2 weeks]] PROVIDER:[TOKEN:[[id number]],FOLLOWUP:[1-3 days],ESTABLISHEDPATIENT:[T]],PROVIDER:[TOKEN:[[id number]],FOLLOWUP:[2 weeks]],FREE:[LAST:[primary care doctor],PHONE:[(   )    -],FAX:[(   )    -],FOLLOWUP:[1 week]]

## 2023-05-18 NOTE — DISCHARGE NOTE PROVIDER - NSDCCPCAREPLAN_GEN_ALL_CORE_FT
PRINCIPAL DISCHARGE DIAGNOSIS  Diagnosis: Lumbar vertebral fracture  Assessment and Plan of Treatment: Continue current medications  Follow-up with orthopedics in two weeks      SECONDARY DISCHARGE DIAGNOSES  Diagnosis: Rib lesion  Assessment and Plan of Treatment: Follow-up with Dr. San to review blood work results and possible rib biopsy next week    Diagnosis: Hyponatremia  Assessment and Plan of Treatment: Follow-up with your primary care doctor within 1 week.      Diagnosis: Essential hypertension  Assessment and Plan of Treatment: Continue current medications   Follow-up with your primary care doctor within 1 week.

## 2023-05-19 LAB
ANION GAP SERPL CALC-SCNC: 6 MMOL/L — SIGNIFICANT CHANGE UP (ref 5–17)
BUN SERPL-MCNC: 10 MG/DL — SIGNIFICANT CHANGE UP (ref 7–23)
CALCIUM SERPL-MCNC: 9.9 MG/DL — SIGNIFICANT CHANGE UP (ref 8.5–10.1)
CHLORIDE SERPL-SCNC: 98 MMOL/L — SIGNIFICANT CHANGE UP (ref 96–108)
CO2 SERPL-SCNC: 23 MMOL/L — SIGNIFICANT CHANGE UP (ref 22–31)
CREAT SERPL-MCNC: 0.58 MG/DL — SIGNIFICANT CHANGE UP (ref 0.5–1.3)
EGFR: 88 ML/MIN/1.73M2 — SIGNIFICANT CHANGE UP
GLUCOSE SERPL-MCNC: 99 MG/DL — SIGNIFICANT CHANGE UP (ref 70–99)
OSMOLALITY UR: 233 MOSM/KG — SIGNIFICANT CHANGE UP (ref 50–1200)
POTASSIUM SERPL-MCNC: 4.4 MMOL/L — SIGNIFICANT CHANGE UP (ref 3.5–5.3)
POTASSIUM SERPL-SCNC: 4.4 MMOL/L — SIGNIFICANT CHANGE UP (ref 3.5–5.3)
PROT SERPL-MCNC: 7.6 G/DL — SIGNIFICANT CHANGE UP (ref 6–8.3)
PROT SERPL-MCNC: 7.6 G/DL — SIGNIFICANT CHANGE UP (ref 6–8.3)
SODIUM SERPL-SCNC: 127 MMOL/L — LOW (ref 135–145)
SODIUM UR-SCNC: 45 MMOL/L — SIGNIFICANT CHANGE UP

## 2023-05-19 RX ADMIN — SENNA PLUS 2 TABLET(S): 8.6 TABLET ORAL at 21:20

## 2023-05-19 RX ADMIN — TRAMADOL HYDROCHLORIDE 50 MILLIGRAM(S): 50 TABLET ORAL at 06:32

## 2023-05-19 RX ADMIN — TRAMADOL HYDROCHLORIDE 50 MILLIGRAM(S): 50 TABLET ORAL at 14:04

## 2023-05-19 RX ADMIN — LOSARTAN POTASSIUM 12.5 MILLIGRAM(S): 100 TABLET, FILM COATED ORAL at 05:33

## 2023-05-19 RX ADMIN — TRAMADOL HYDROCHLORIDE 50 MILLIGRAM(S): 50 TABLET ORAL at 05:32

## 2023-05-19 RX ADMIN — TRAMADOL HYDROCHLORIDE 50 MILLIGRAM(S): 50 TABLET ORAL at 13:04

## 2023-05-19 NOTE — PROGRESS NOTE ADULT - SUBJECTIVE AND OBJECTIVE BOX
Patient is a 87y old  Female who presents with a chief complaint of back pain (18 May 2023 23:50)    Patient seen in follow up for hyponatremia.        PAST MEDICAL HISTORY:  Hypertension    GERD (gastroesophageal reflux disease)    History of ankle surgery    Ankle fracture, left    Gastro-esophageal reflux disease without esophagitis    Benign essential hypertension    Chronic cough    Chronic low back pain    Dyspnea on exertion    Back pain with sciatica    Regurgitation of food      MEDICATIONS  (STANDING):  losartan 12.5 milliGRAM(s) Oral daily  senna 2 Tablet(s) Oral at bedtime    MEDICATIONS  (PRN):  acetaminophen     Tablet .. 650 milliGRAM(s) Oral every 6 hours PRN Temp greater or equal to 38C (100.4F), Mild Pain (1 - 3)  aluminum hydroxide/magnesium hydroxide/simethicone Suspension 30 milliLiter(s) Oral every 4 hours PRN Dyspepsia  melatonin 3 milliGRAM(s) Oral at bedtime PRN Insomnia  ondansetron Injectable 4 milliGRAM(s) IV Push every 6 hours PRN Nausea and/or Vomiting  traMADol 50 milliGRAM(s) Oral every 6 hours PRN Moderate Pain (4 - 6)  traMADol 100 milliGRAM(s) Oral every 6 hours PRN Severe Pain (7 - 10)    T(C): 36.8 (05-19-23 @ 05:14), Max: 36.8 (05-19-23 @ 05:14)  HR: 72 (05-19-23 @ 05:14) (67 - 80)  BP: 113/64 (05-19-23 @ 05:14) (100/63 - 135/72)  RR: 17 (05-19-23 @ 05:14)  SpO2: 95% (05-19-23 @ 05:14)  Wt(kg): --  I&O's Detail      PHYSICAL EXAM:  General: No distress  Respiratory: b/l air entry  Cardiovascular: S1 S2  Gastrointestinal: soft  Extremities:  edema          LABORATORY:    05-19    127<L>  |  98  |  10  ----------------------------<  99  4.4   |  23  |  0.58    Ca    9.9      19 May 2023 06:18    TPro  7.2  /  Alb  3.2<L>  /  TBili  0.3  /  DBili  x   /  AST  17  /  ALT  20  /  AlkPhos  97  05-18    Sodium, Serum: 127 mmol/L (05-19 @ 06:18)  Sodium, Serum: 129 mmol/L (05-18 @ 05:40)    Potassium, Serum: 4.4 mmol/L (05-19 @ 06:18)  Potassium, Serum: 4.4 mmol/L (05-18 @ 05:40)    Hemoglobin: 9.8 g/dL (05-17 @ 05:29)  Hemoglobin: 10.5 g/dL (05-16 @ 14:30)    Creatinine, Serum 0.58 (05-19 @ 06:18)  Creatinine, Serum 0.70 (05-18 @ 05:40)  Creatinine, Serum 0.60 (05-17 @ 05:29)  Creatinine, Serum 0.55 (05-16 @ 14:30)        LIVER FUNCTIONS - ( 18 May 2023 05:40 )  Alb: 3.2 g/dL / Pro: 7.2 g/dL / ALK PHOS: 97 U/L / ALT: 20 U/L / AST: 17 U/L / GGT: x

## 2023-05-19 NOTE — PROGRESS NOTE ADULT - SUBJECTIVE AND OBJECTIVE BOX
Date of Service: 05-19-23 @ 13:34    Patient is a 87y old  Female who presents with a chief complaint of back pain (19 May 2023 11:58)      INTERVAL HPI/OVERNIGHT EVENTS: Patient seen and examined. NAD. No complaints. Feels better.    Vital Signs Last 24 Hrs  T(C): 36.5 (19 May 2023 13:07), Max: 36.8 (19 May 2023 05:14)  T(F): 97.7 (19 May 2023 13:07), Max: 98.3 (19 May 2023 05:14)  HR: 68 (19 May 2023 13:07) (68 - 72)  BP: 104/62 (19 May 2023 13:07) (104/62 - 121/73)  BP(mean): --  RR: 18 (19 May 2023 13:07) (17 - 19)  SpO2: 91% (19 May 2023 13:07) (91% - 95%)    Parameters below as of 19 May 2023 13:07  Patient On (Oxygen Delivery Method): room air        05-19    127<L>  |  98  |  10  ----------------------------<  99  4.4   |  23  |  0.58    Ca    9.9      19 May 2023 06:18    TPro  7.2  /  Alb  3.2<L>  /  TBili  0.3  /  DBili  x   /  AST  17  /  ALT  20  /  AlkPhos  97  05-18        CAPILLARY BLOOD GLUCOSE                  acetaminophen     Tablet .. 650 milliGRAM(s) Oral every 6 hours PRN  aluminum hydroxide/magnesium hydroxide/simethicone Suspension 30 milliLiter(s) Oral every 4 hours PRN  losartan 12.5 milliGRAM(s) Oral daily  melatonin 3 milliGRAM(s) Oral at bedtime PRN  ondansetron Injectable 4 milliGRAM(s) IV Push every 6 hours PRN  senna 2 Tablet(s) Oral at bedtime  traMADol 50 milliGRAM(s) Oral every 6 hours PRN  traMADol 100 milliGRAM(s) Oral every 6 hours PRN              REVIEW OF SYSTEMS:  CONSTITUTIONAL: No fever, no weight loss, or no fatigue  NECK: No pain, no stiffness  RESPIRATORY: No cough, no wheezing, no chills, no hemoptysis, No shortness of breath  CARDIOVASCULAR: No chest pain, no palpitations, no dizziness, no leg swelling  GASTROINTESTINAL: No abdominal pain. No nausea, no vomiting, no hematemesis; No diarrhea, no constipation. No melena, no hematochezia.  GENITOURINARY: No dysuria, no frequency, no hematuria, no incontinence  NEUROLOGICAL: No headaches, no loss of strength, no numbness, no tremors  SKIN: No itching, no burning  MUSCULOSKELETAL: No joint pain, no swelling; No muscle, + back pain, no extremity pain  PSYCHIATRIC: No depression, no mood swings,   HEME/LYMPH: No easy bruising, no bleeding gums  ALLERY AND IMMUNOLOGIC: No hives       Consultant(s) Notes Reviewed:  [X] YES  [ ] NO    PHYSICAL EXAM:  GENERAL: NAD  HEAD:  Atraumatic, Normocephalic  EYES: EOMI, PERRLA, conjunctiva and sclera clear  ENMT: No tonsillar erythema, exudates, or enlargement; Moist mucous membranes  NECK: Supple, No JVD  NERVOUS SYSTEM:  Awake & alert  CHEST/LUNG: Clear to auscultation bilaterally; No rales, rhonchi, wheezing,  HEART: Regular rate and rhythm  ABDOMEN: Soft, Nontender, Nondistended; Bowel sounds present  EXTREMITIES:  No clubbing, cyanosis, or edema  LYMPH: No lymphadenopathy noted  SKIN: No rashes      Advanced care planning discussed with patient/family [X] YES   [ ] NO    Advanced care planning discussed with patient/family. Patient's health status was discussed. All appropriate changes have been made regarding patient's end-of-life care. Advanced care planning forms reviewed/discussed/completed.  20 minutes spent.

## 2023-05-19 NOTE — PROGRESS NOTE ADULT - SUBJECTIVE AND OBJECTIVE BOX
[INTERVAL HX: ]  Patient seen and examined;  Chart reviewed and events noted;     Discussed with patient, patient daughter Edmund and patient's sister Ada concern that patient's overall findings represents malignancy due to multiple myeloma.    Recommend patient undergo further evaluation including bone marrow biopsy.  Questions and concerns addressed.    [MEDICATIONS]  MEDICATIONS  (STANDING):  losartan 12.5 milliGRAM(s) Oral daily  senna 2 Tablet(s) Oral at bedtime    MEDICATIONS  (PRN):  acetaminophen     Tablet .. 650 milliGRAM(s) Oral every 6 hours PRN Temp greater or equal to 38C (100.4F), Mild Pain (1 - 3)  aluminum hydroxide/magnesium hydroxide/simethicone Suspension 30 milliLiter(s) Oral every 4 hours PRN Dyspepsia  melatonin 3 milliGRAM(s) Oral at bedtime PRN Insomnia  ondansetron Injectable 4 milliGRAM(s) IV Push every 6 hours PRN Nausea and/or Vomiting  traMADol 50 milliGRAM(s) Oral every 6 hours PRN Moderate Pain (4 - 6)  traMADol 100 milliGRAM(s) Oral every 6 hours PRN Severe Pain (7 - 10)      [VITALS]  Vital Signs Last 24 Hrs  T(C): 36.7 (19 May 2023 20:44), Max: 36.8 (19 May 2023 05:14)  T(F): 98 (19 May 2023 20:44), Max: 98.3 (19 May 2023 05:14)  HR: 80 (19 May 2023 20:44) (68 - 80)  BP: 107/62 (19 May 2023 20:44) (104/62 - 113/64)  BP(mean): --  RR: 18 (19 May 2023 20:44) (17 - 18)  SpO2: 94% (19 May 2023 20:44) (91% - 95%)    Parameters below as of 19 May 2023 20:44  Patient On (Oxygen Delivery Method): room air      [WT/HT]  Daily     Daily   [VENT]      [PHYSICAL EXAM]  GEN: NAD  HEENT: normocephalic and atraumatic. EOMI. PERRL.    NECK: Supple.  No lymphadenopathy   LUNGS: Clear to auscultation.  HEART: Regular rate and rhythm,  no MRG  ABDOMEN: Soft, nontender, and nondistended.  Positive bowel sounds.    : No CVA tenderness  EXTREMITIES: Without edema.  NEUROLOGIC: grossly intact.  PSYCHIATRIC: Appropriate affect .  SKIN: No rash     [LABS:]    05-19    127<L>  |  98  |  10  ----------------------------<  99  4.4   |  23  |  0.58    Ca    9.9      19 May 2023 06:18    TPro  7.6  /  Alb  x   /  TBili  x   /  DBili  x   /  AST  x   /  ALT  x   /  AlkPhos  x   05-18          Ferritin, Serum: 72 ng/mL [15 - 150] (05-18-23 @ 10:00)    Iron - Total Binding Capacity.: 261 ug/dL [220 - 430] (05-18-23 @ 10:00)    Sedimentation Rate, Erythrocyte: 97 mm/hr *H* [0 - 20] (05-18-23 @ 10:00)    Vitamin B12, Serum: >2000 pg/mL *H* [232 - 1245] (05-18-23 @ 10:00)    Folate, Serum: 10.2 ng/mL (05-18-23 @ 10:00)    Reticulocyte Count (05-18-23 @ 10:00)  Reticulocyte Percent: 1.2 % [0.5 - 2.5]  Absolute Reticulocytes: 43.8 K/uL [25.0 - 125.0]          COVID-19 PCR: NotDetec (03 Feb 2023 21:55)          [RADIOLOGY STUDIES:]

## 2023-05-20 ENCOUNTER — TRANSCRIPTION ENCOUNTER (OUTPATIENT)
Age: 88
End: 2023-05-20

## 2023-05-20 VITALS
TEMPERATURE: 98 F | DIASTOLIC BLOOD PRESSURE: 72 MMHG | HEART RATE: 95 BPM | OXYGEN SATURATION: 94 % | RESPIRATION RATE: 18 BRPM | SYSTOLIC BLOOD PRESSURE: 124 MMHG

## 2023-05-20 DIAGNOSIS — T14.8XXA OTHER INJURY OF UNSPECIFIED BODY REGION, INITIAL ENCOUNTER: ICD-10-CM

## 2023-05-20 LAB
ANION GAP SERPL CALC-SCNC: 5 MMOL/L — SIGNIFICANT CHANGE UP (ref 5–17)
BUN SERPL-MCNC: 12 MG/DL — SIGNIFICANT CHANGE UP (ref 7–23)
CALCIUM SERPL-MCNC: 10.1 MG/DL — SIGNIFICANT CHANGE UP (ref 8.5–10.1)
CHLORIDE SERPL-SCNC: 98 MMOL/L — SIGNIFICANT CHANGE UP (ref 96–108)
CO2 SERPL-SCNC: 27 MMOL/L — SIGNIFICANT CHANGE UP (ref 22–31)
CREAT SERPL-MCNC: 0.63 MG/DL — SIGNIFICANT CHANGE UP (ref 0.5–1.3)
EGFR: 86 ML/MIN/1.73M2 — SIGNIFICANT CHANGE UP
GLUCOSE SERPL-MCNC: 107 MG/DL — HIGH (ref 70–99)
HCT VFR BLD CALC: 31.5 % — LOW (ref 34.5–45)
HGB BLD-MCNC: 10.5 G/DL — LOW (ref 11.5–15.5)
MAGNESIUM SERPL-MCNC: 2.1 MG/DL — SIGNIFICANT CHANGE UP (ref 1.6–2.6)
MCHC RBC-ENTMCNC: 28.2 PG — SIGNIFICANT CHANGE UP (ref 27–34)
MCHC RBC-ENTMCNC: 33.3 GM/DL — SIGNIFICANT CHANGE UP (ref 32–36)
MCV RBC AUTO: 84.5 FL — SIGNIFICANT CHANGE UP (ref 80–100)
NRBC # BLD: 0 /100 WBCS — SIGNIFICANT CHANGE UP (ref 0–0)
PLATELET # BLD AUTO: 167 K/UL — SIGNIFICANT CHANGE UP (ref 150–400)
POTASSIUM SERPL-MCNC: 3.9 MMOL/L — SIGNIFICANT CHANGE UP (ref 3.5–5.3)
POTASSIUM SERPL-SCNC: 3.9 MMOL/L — SIGNIFICANT CHANGE UP (ref 3.5–5.3)
RBC # BLD: 3.73 M/UL — LOW (ref 3.8–5.2)
RBC # FLD: 14.9 % — HIGH (ref 10.3–14.5)
SODIUM SERPL-SCNC: 130 MMOL/L — LOW (ref 135–145)
WBC # BLD: 6.28 K/UL — SIGNIFICANT CHANGE UP (ref 3.8–10.5)
WBC # FLD AUTO: 6.28 K/UL — SIGNIFICANT CHANGE UP (ref 3.8–10.5)

## 2023-05-20 PROCEDURE — 71275 CT ANGIOGRAPHY CHEST: CPT | Mod: MA

## 2023-05-20 PROCEDURE — 97530 THERAPEUTIC ACTIVITIES: CPT

## 2023-05-20 PROCEDURE — 97162 PT EVAL MOD COMPLEX 30 MIN: CPT

## 2023-05-20 PROCEDURE — 84156 ASSAY OF PROTEIN URINE: CPT

## 2023-05-20 PROCEDURE — 36415 COLL VENOUS BLD VENIPUNCTURE: CPT

## 2023-05-20 PROCEDURE — A9561: CPT

## 2023-05-20 PROCEDURE — 85652 RBC SED RATE AUTOMATED: CPT

## 2023-05-20 PROCEDURE — 82746 ASSAY OF FOLIC ACID SERUM: CPT

## 2023-05-20 PROCEDURE — 99285 EMERGENCY DEPT VISIT HI MDM: CPT

## 2023-05-20 PROCEDURE — 86335 IMMUNFIX E-PHORSIS/URINE/CSF: CPT

## 2023-05-20 PROCEDURE — 80048 BASIC METABOLIC PNL TOTAL CA: CPT

## 2023-05-20 PROCEDURE — 86140 C-REACTIVE PROTEIN: CPT

## 2023-05-20 PROCEDURE — 86334 IMMUNOFIX E-PHORESIS SERUM: CPT

## 2023-05-20 PROCEDURE — 84165 PROTEIN E-PHORESIS SERUM: CPT

## 2023-05-20 PROCEDURE — 84484 ASSAY OF TROPONIN QUANT: CPT

## 2023-05-20 PROCEDURE — 86325 OTHER IMMUNOELECTROPHORESIS: CPT

## 2023-05-20 PROCEDURE — 82306 VITAMIN D 25 HYDROXY: CPT

## 2023-05-20 PROCEDURE — 85025 COMPLETE CBC W/AUTO DIFF WBC: CPT

## 2023-05-20 PROCEDURE — 83690 ASSAY OF LIPASE: CPT

## 2023-05-20 PROCEDURE — 83521 IG LIGHT CHAINS FREE EACH: CPT

## 2023-05-20 PROCEDURE — 72146 MRI CHEST SPINE W/O DYE: CPT

## 2023-05-20 PROCEDURE — 85027 COMPLETE CBC AUTOMATED: CPT

## 2023-05-20 PROCEDURE — 82728 ASSAY OF FERRITIN: CPT

## 2023-05-20 PROCEDURE — 84300 ASSAY OF URINE SODIUM: CPT

## 2023-05-20 PROCEDURE — 85045 AUTOMATED RETICULOCYTE COUNT: CPT

## 2023-05-20 PROCEDURE — 72131 CT LUMBAR SPINE W/O DYE: CPT | Mod: MA

## 2023-05-20 PROCEDURE — 72148 MRI LUMBAR SPINE W/O DYE: CPT

## 2023-05-20 PROCEDURE — 84166 PROTEIN E-PHORESIS/URINE/CSF: CPT

## 2023-05-20 PROCEDURE — 83935 ASSAY OF URINE OSMOLALITY: CPT

## 2023-05-20 PROCEDURE — 93005 ELECTROCARDIOGRAM TRACING: CPT

## 2023-05-20 PROCEDURE — 82652 VIT D 1 25-DIHYDROXY: CPT

## 2023-05-20 PROCEDURE — 80053 COMPREHEN METABOLIC PANEL: CPT

## 2023-05-20 PROCEDURE — 83550 IRON BINDING TEST: CPT

## 2023-05-20 PROCEDURE — 97116 GAIT TRAINING THERAPY: CPT

## 2023-05-20 PROCEDURE — 78306 BONE IMAGING WHOLE BODY: CPT

## 2023-05-20 PROCEDURE — 82784 ASSAY IGA/IGD/IGG/IGM EACH: CPT

## 2023-05-20 PROCEDURE — 77075 RADEX OSSEOUS SURVEY COMPL: CPT

## 2023-05-20 PROCEDURE — 72141 MRI NECK SPINE W/O DYE: CPT

## 2023-05-20 PROCEDURE — 74174 CTA ABD&PLVS W/CONTRAST: CPT | Mod: MA

## 2023-05-20 PROCEDURE — 83970 ASSAY OF PARATHORMONE: CPT

## 2023-05-20 PROCEDURE — 84155 ASSAY OF PROTEIN SERUM: CPT

## 2023-05-20 PROCEDURE — 83540 ASSAY OF IRON: CPT

## 2023-05-20 PROCEDURE — 82607 VITAMIN B-12: CPT

## 2023-05-20 PROCEDURE — 82310 ASSAY OF CALCIUM: CPT

## 2023-05-20 PROCEDURE — 77075 RADEX OSSEOUS SURVEY COMPL: CPT | Mod: 26

## 2023-05-20 PROCEDURE — 83735 ASSAY OF MAGNESIUM: CPT

## 2023-05-20 RX ORDER — TRAMADOL HYDROCHLORIDE 50 MG/1
1 TABLET ORAL
Qty: 30 | Refills: 0
Start: 2023-05-20

## 2023-05-20 RX ORDER — GABAPENTIN 400 MG/1
1 CAPSULE ORAL
Refills: 0 | DISCHARGE

## 2023-05-20 RX ORDER — SODIUM CHLORIDE 9 MG/ML
2 INJECTION INTRAMUSCULAR; INTRAVENOUS; SUBCUTANEOUS ONCE
Refills: 0 | Status: COMPLETED | OUTPATIENT
Start: 2023-05-20 | End: 2023-05-20

## 2023-05-20 RX ADMIN — TRAMADOL HYDROCHLORIDE 100 MILLIGRAM(S): 50 TABLET ORAL at 13:30

## 2023-05-20 RX ADMIN — TRAMADOL HYDROCHLORIDE 100 MILLIGRAM(S): 50 TABLET ORAL at 12:30

## 2023-05-20 RX ADMIN — SODIUM CHLORIDE 2 GRAM(S): 9 INJECTION INTRAMUSCULAR; INTRAVENOUS; SUBCUTANEOUS at 12:30

## 2023-05-20 NOTE — PROGRESS NOTE ADULT - PROBLEM SELECTOR PLAN 2
Bone scan noted  Oncology consult noted  MM work-up sent -- f/u results  Possible rib bx as outpatient  Further work-up/management pending clinical course.
Bone scan  Oncology consult  Further work-up/management pending clinical course.
Bone scan noted  Oncology consult noted  MM work-up sent -- f/u results  Possible rib bx as outpatient  Further work-up/management pending clinical course.
Bone scan noted  Oncology consult noted  MM work-up sent -- will f/u results as outpatient  Possible rib bx as outpatient  Further work-up/management pending clinical course.

## 2023-05-20 NOTE — PROGRESS NOTE ADULT - ASSESSMENT
A/P 87y year old Female with LBP due to vertebral compression fx at multiple levels    Patient tolerating pain medication regimen, pain is improved.  Can follow up outpatient with pain mgmt or with primary care -- can consider escalation from tramadol to 2.5mg oxycodone if not adequate pain relief with 50mg tramadol  She may be a candidate for ablations -- can follow up with interventional pain mgmt as outpatient as well.  Primary team notes are reviewed  Laboratory studies reviewed including those mentioned earlier/above  Discussed management/coordinated care with primary team/referring provider.  High risk of morbidity from treatment with use of schedule IV controlled substances.    
Hyponatremia  Anemia  Hypertension  Back pain, LS spine compression fracture    sodium levels improving. Avoid excessive PO fluids. Monitor BP trend. Titrate BP meds as needed.  Stable renal indices. D/c planning. 
[ASSESSMENT and  PLAN]  M84. 48XA.  Pathologic fracture left lateral  sixth rib. Multiple vertebral body compression fractures.  D63.8 Anemia due to chronic disease  E87.1  Hyponatremia  I10  Hypertension  S22.000A  Back pain, LS spine compression fracture    Patient is a 87y old  Female who presents with a chief complaint of back pain   Closed compression fracture of lumbosacral spine.   Pathologic fracture left lateral  sixth rib. Multiple vertebral body compression fractures.    Improving sodium levels. To continue current meds. Monitor BP trend. Titrate BP meds as needed.  Stable renal indices. Will follow electrolytes and renal function trend.     RECOMMENDATIONS  Transfuse PRBC as clinically indicated.   Transfuse PRBC if Hgb <7.0 or if symptomatic.   Follow CBC    Check Anemia studies.   Check MM studies  Check ESR and CRP    Consideration for Rib bx    awaiting lab return  MRI with benign findings except rib  if discharged will arrange for biopsy as outpatient    Discussed with Dr Norris.
[ASSESSMENT and  PLAN]  M84. 48XA.  Pathologic fracture left lateral  sixth rib. Multiple vertebral body compression fractures.  D63.8 Anemia due to chronic disease  E87.1  Hyponatremia  I10  Hypertension  S22.000A  Back pain, LS spine compression fracture  C90.0   MM  D50.0 Iron deficiency anemia    Patient is a 87y old  Female who presents with a chief complaint of back pain   Closed compression fracture of lumbosacral spine.   Pathologic fracture left lateral  sixth rib. Multiple vertebral body compression fractures.    Improving sodium levels. To continue current meds. Monitor BP trend. Titrate BP meds as needed.  Stable renal indices. Will follow electrolytes and renal function trend.     Work-up with marked elevated STEPHANE kappa of 188.2.  IgG decreased 336, IgA increased at 1140, IgM 49.  IgA-lambda monoclonal protein and likely multiple myeloma    Ferritin 72 inappropriate low given elevated ESR and CRP.      RECOMMENDATIONS  Transfuse PRBC as clinically indicated.   Transfuse PRBC if Hgb <7.0 or if symptomatic.   Follow CBC    Followup Anemia studies.   Followup MM studies  Elevated ESR 97 and CRP 5    Consideration for Rib bx    awaiting lab return  MRI with benign findings except rib  if discharged will arrange for biopsy as outpatient  Follow in office w me on Mon 830AM for eval.     Discussed with Dr Norris.
Hyponatremia  Anemia  Hypertension  Back pain, LS spine compression fracture    sodium levels trending down. Hold d/c. PO fluid restriction. Monitor BP trend. Titrate BP meds as needed.  Stable renal indices. Will follow electrolytes and renal function trend. D/w pt's daughter at bedside.

## 2023-05-20 NOTE — PROGRESS NOTE ADULT - SUBJECTIVE AND OBJECTIVE BOX
Physical Medicine and Rehabilitation Subsequent Evaluation    Patient seen in followup for pain management    Patient seen and examined at bedside. Notes improved pain relative to yesterday.    Medical studies/laboratory studies reviewed, including:                          10.5   6.28  )-----------( 167      ( 20 May 2023 08:52 )             31.5   05-20    130<L>  |  98  |  12  ----------------------------<  107<H>  3.9   |  27  |  0.63    Ca    10.1      20 May 2023 08:52  Mg     2.1     05-20    ROS:  Constitutional: Denies fevers or chills  MSK: LBP ongoing albeit improved     Medications:   acetaminophen     Tablet .. 650 milliGRAM(s) Oral every 6 hours PRN  aluminum hydroxide/magnesium hydroxide/simethicone Suspension 30 milliLiter(s) Oral every 4 hours PRN  losartan 12.5 milliGRAM(s) Oral daily  melatonin 3 milliGRAM(s) Oral at bedtime PRN  ondansetron Injectable 4 milliGRAM(s) IV Push every 6 hours PRN  senna 2 Tablet(s) Oral at bedtime  traMADol 50 milliGRAM(s) Oral every 6 hours PRN  traMADol 100 milliGRAM(s) Oral every 6 hours PRN      Physical Exam:   Vitals: T(C): 36.8 (05-20-23 @ 13:24), Max: 36.8 (05-20-23 @ 13:24)  HR: 95 (05-20-23 @ 13:24) (79 - 95)  BP: 124/72 (05-20-23 @ 13:24) (102/79 - 124/72)  RR: 18 (05-20-23 @ 13:24) (18 - 18)  SpO2: 94% (05-20-23 @ 13:24) (92% - 94%)    Constitutional: Gen: In no acute distress, cooperative with exam and questioning   MSK: Cervical/Thoracic Spine Exam:  Inspection:  no erythema, no edema noted  Power:  Motor exam 5/5 grossly in upper extremities bilaterally  Range of Motion:  decreased range of motion due to pain   Palpation:  muscle spasm and tenderness to palpation across the cervical spine including in Trapezius,SCM, and cervical/ thoracic paraspinals bilaterally  Sensation:  sensation is intact bilaterally  Special Tests:  Spurling's test is negative bilaterally, Llhermite’s sign is negative, Positive facet loading.  Montelongo sign is negative       Lumbar Spine Exam:  Inspection:  no erythema, no edema   Palpation:  SIJ tenderness is negative, there is muscle spasm and tenderness to palpation across lumbar paraspinals bilaterally  Range of Motion:  decreased range of motion of lumbar spine secondary to pain   Power:  motor exam 5/5 throughout LE  Sensation:  sensation intact b/l  Reflexes:  DTR’s= symmetric in LE  Special Tests:  Straight leg raising test is negative bilaterally ,Donnie/MORRIS maneuver is negative, Positive Facet loading, Clonus negative

## 2023-05-20 NOTE — PROGRESS NOTE ADULT - PROVIDER SPECIALTY LIST ADULT
Heme/Onc
Orthopedics
Nephrology
Heme/Onc
Nephrology
Internal Medicine
Physiatry
Internal Medicine

## 2023-05-20 NOTE — DISCHARGE NOTE NURSING/CASE MANAGEMENT/SOCIAL WORK - PATIENT PORTAL LINK FT
You can access the FollowMyHealth Patient Portal offered by [facility] by registering at the following website: [email or web address]. By joining [employer]’s FollowMyHealth portal, you will also be able to view your health information using other applications (apps) compatible with our system.

## 2023-05-20 NOTE — PROGRESS NOTE ADULT - PROBLEM SELECTOR PLAN 1
Pain meds prn  Ortho consult noted  Await MRI TLS spine  PT  Further work-up/management pending clinical course.
Pain meds prn  Ortho consult noted  MRI noted  PT

## 2023-05-20 NOTE — CASE MANAGEMENT PROGRESS NOTE - NSCMPROGRESSNOTE_GEN_ALL_CORE
Patient is for transition home today. Referral for home care was accepted by [facility] for SOS 5/22/2023. Daughter agreed with the plan of care.

## 2023-05-20 NOTE — PROGRESS NOTE ADULT - SUBJECTIVE AND OBJECTIVE BOX
Patient is a 87y old  Female who presents with a chief complaint of back pain (18 May 2023 23:50)    Patient seen in follow up for hyponatremia.        PAST MEDICAL HISTORY:  Hypertension    GERD (gastroesophageal reflux disease)    History of ankle surgery    Ankle fracture, left    Gastro-esophageal reflux disease without esophagitis    Benign essential hypertension    Chronic cough    Chronic low back pain    Dyspnea on exertion    Back pain with sciatica    Regurgitation of food      MEDICATIONS  (STANDING):  losartan 12.5 milliGRAM(s) Oral daily  senna 2 Tablet(s) Oral at bedtime  sodium chloride 2 Gram(s) Oral once    MEDICATIONS  (PRN):  acetaminophen     Tablet .. 650 milliGRAM(s) Oral every 6 hours PRN Temp greater or equal to 38C (100.4F), Mild Pain (1 - 3)  aluminum hydroxide/magnesium hydroxide/simethicone Suspension 30 milliLiter(s) Oral every 4 hours PRN Dyspepsia  melatonin 3 milliGRAM(s) Oral at bedtime PRN Insomnia  ondansetron Injectable 4 milliGRAM(s) IV Push every 6 hours PRN Nausea and/or Vomiting  traMADol 100 milliGRAM(s) Oral every 6 hours PRN Severe Pain (7 - 10)  traMADol 50 milliGRAM(s) Oral every 6 hours PRN Moderate Pain (4 - 6)    T(C): 36.7 (05-20-23 @ 04:16), Max: 36.8 (05-19-23 @ 05:14)  HR: 79 (05-20-23 @ 04:16) (68 - 80)  BP: 102/79 (05-20-23 @ 04:16) (102/79 - 121/73)  RR: 18 (05-20-23 @ 04:16)  SpO2: 92% (05-20-23 @ 04:16)  Wt(kg): --  I&O's Detail    19 May 2023 07:01  -  20 May 2023 07:00  --------------------------------------------------------  IN:  Total IN: 0 mL    OUT:    Voided (mL): 200 mL  Total OUT: 200 mL    Total NET: -200 mL          PHYSICAL EXAM:  General: No distress  Respiratory: b/l air entry  Cardiovascular: S1 S2  Gastrointestinal: soft  Extremities:  edema          LABORATORY:                        10.5   6.28  )-----------( 167      ( 20 May 2023 08:52 )             31.5     05-20    130<L>  |  98  |  12  ----------------------------<  107<H>  3.9   |  27  |  0.63    Ca    10.1      20 May 2023 08:52  Mg     2.1     05-20      Sodium, Serum: 130 mmol/L (05-20 @ 08:52)  Sodium, Serum: 127 mmol/L (05-19 @ 06:18)    Potassium, Serum: 3.9 mmol/L (05-20 @ 08:52)  Potassium, Serum: 4.4 mmol/L (05-19 @ 06:18)    Hemoglobin: 10.5 g/dL (05-20 @ 08:52)    Creatinine, Serum 0.63 (05-20 @ 08:52)  Creatinine, Serum 0.58 (05-19 @ 06:18)  Creatinine, Serum 0.70 (05-18 @ 05:40)

## 2023-05-20 NOTE — DISCHARGE NOTE NURSING/CASE MANAGEMENT/SOCIAL WORK - NSDCPEFALRISK_GEN_ALL_CORE
For information on Fall & Injury Prevention, visit: [email or web address] OR  [email or web address] OR  https://www.cdc.gov/steadi/patient.html

## 2023-05-20 NOTE — PROGRESS NOTE ADULT - SUBJECTIVE AND OBJECTIVE BOX
Date of Service: 05-20-23 @ 11:49    Patient is a 87y old  Female who presents with a chief complaint of back pain (19 May 2023 23:15)      INTERVAL HPI/OVERNIGHT EVENTS: Patient seen and examined. NAD. No complaints.    Vital Signs Last 24 Hrs  T(C): 36.7 (20 May 2023 04:16), Max: 36.7 (19 May 2023 20:44)  T(F): 98.1 (20 May 2023 04:16), Max: 98.1 (20 May 2023 04:16)  HR: 79 (20 May 2023 04:16) (68 - 80)  BP: 102/79 (20 May 2023 04:16) (102/79 - 107/62)  BP(mean): --  RR: 18 (20 May 2023 04:16) (18 - 18)  SpO2: 92% (20 May 2023 04:16) (91% - 94%)    Parameters below as of 19 May 2023 20:44  Patient On (Oxygen Delivery Method): room air        05-20    130<L>  |  98  |  12  ----------------------------<  107<H>  3.9   |  27  |  0.63    Ca    10.1      20 May 2023 08:52  Mg     2.1     05-20                            10.5   6.28  )-----------( 167      ( 20 May 2023 08:52 )             31.5       CAPILLARY BLOOD GLUCOSE                  acetaminophen     Tablet .. 650 milliGRAM(s) Oral every 6 hours PRN  aluminum hydroxide/magnesium hydroxide/simethicone Suspension 30 milliLiter(s) Oral every 4 hours PRN  losartan 12.5 milliGRAM(s) Oral daily  melatonin 3 milliGRAM(s) Oral at bedtime PRN  ondansetron Injectable 4 milliGRAM(s) IV Push every 6 hours PRN  senna 2 Tablet(s) Oral at bedtime  sodium chloride 2 Gram(s) Oral once  traMADol 50 milliGRAM(s) Oral every 6 hours PRN  traMADol 100 milliGRAM(s) Oral every 6 hours PRN              REVIEW OF SYSTEMS:  CONSTITUTIONAL: No fever, no weight loss, or no fatigue  NECK: No pain, no stiffness  RESPIRATORY: No cough, no wheezing, no chills, no hemoptysis, No shortness of breath  CARDIOVASCULAR: No chest pain, no palpitations, no dizziness, no leg swelling  GASTROINTESTINAL: No abdominal pain. No nausea, no vomiting, no hematemesis; No diarrhea, no constipation. No melena, no hematochezia.  GENITOURINARY: No dysuria, no frequency, no hematuria, no incontinence  NEUROLOGICAL: No headaches, no loss of strength, no numbness, no tremors  SKIN: No itching, no burning  MUSCULOSKELETAL: No joint pain, no swelling; No muscle, no back, no extremity pain  PSYCHIATRIC: No depression, no mood swings,   HEME/LYMPH: No easy bruising, no bleeding gums  ALLERY AND IMMUNOLOGIC: No hives       Consultant(s) Notes Reviewed:  [X] YES  [ ] NO    PHYSICAL EXAM:  GENERAL: NAD  HEAD:  Atraumatic, Normocephalic  EYES: EOMI, PERRLA, conjunctiva and sclera clear  ENMT: No tonsillar erythema, exudates, or enlargement; Moist mucous membranes  NECK: Supple, No JVD  NERVOUS SYSTEM:  Awake & alert  CHEST/LUNG: Clear to auscultation bilaterally; No rales, rhonchi, wheezing,  HEART: Regular rate and rhythm  ABDOMEN: Soft, Nontender, Nondistended; Bowel sounds present  EXTREMITIES:  No clubbing, cyanosis, or edema  LYMPH: No lymphadenopathy noted  SKIN: No rashes      Advanced care planning discussed with patient/family [X] YES   [ ] NO    Advanced care planning discussed with patient/family. Patient's health status was discussed. All appropriate changes have been made regarding patient's end-of-life care. Advanced care planning forms reviewed/discussed/completed.  20 minutes spent.

## 2023-05-20 NOTE — PATIENT CHOICE NOTE. - NSPTCHOICESTATE_GEN_ALL_CORE

## 2023-05-21 LAB
CREATININE, URINE RESULT: 38 MG/DL — SIGNIFICANT CHANGE UP
PROT ?TM UR-MCNC: 204 MG/DL — HIGH (ref 0–12)
PROT ?TM UR-MCNC: 204 MG/DL — HIGH (ref 0–12)

## 2023-05-22 ENCOUNTER — NON-APPOINTMENT (OUTPATIENT)
Age: 88
End: 2023-05-22

## 2023-05-22 LAB
% ALBUMIN: 46.8 % — SIGNIFICANT CHANGE UP
% ALPHA 1: 4.1 % — SIGNIFICANT CHANGE UP
% ALPHA 2: 9.6 % — SIGNIFICANT CHANGE UP
% BETA: 35.1 % — SIGNIFICANT CHANGE UP
% GAMMA: 4.4 % — SIGNIFICANT CHANGE UP
% M SPIKE: 23.6 % — SIGNIFICANT CHANGE UP
ALBUMIN SERPL ELPH-MCNC: 3.4 G/DL — LOW (ref 3.6–5.5)
ALBUMIN/GLOB SERPL ELPH: 0.9 RATIO — SIGNIFICANT CHANGE UP
ALPHA1 GLOB SERPL ELPH-MCNC: 0.3 G/DL — SIGNIFICANT CHANGE UP (ref 0.1–0.4)
ALPHA2 GLOB SERPL ELPH-MCNC: 0.7 G/DL — SIGNIFICANT CHANGE UP (ref 0.5–1)
B-GLOBULIN SERPL ELPH-MCNC: 2.5 G/DL — HIGH (ref 0.5–1)
GAMMA GLOBULIN: 0.3 G/DL — LOW (ref 0.6–1.6)
M-SPIKE: 1.7 G/DL — HIGH (ref 0–0)
PROT PATTERN SERPL ELPH-IMP: SIGNIFICANT CHANGE UP

## 2023-05-23 ENCOUNTER — APPOINTMENT (OUTPATIENT)
Dept: INTERNAL MEDICINE | Facility: CLINIC | Age: 88
End: 2023-05-23
Payer: MEDICARE

## 2023-05-23 VITALS
WEIGHT: 148 LBS | DIASTOLIC BLOOD PRESSURE: 82 MMHG | OXYGEN SATURATION: 96 % | HEART RATE: 93 BPM | HEIGHT: 60 IN | TEMPERATURE: 97.6 F | BODY MASS INDEX: 29.06 KG/M2 | SYSTOLIC BLOOD PRESSURE: 117 MMHG

## 2023-05-23 DIAGNOSIS — M54.50 LOW BACK PAIN, UNSPECIFIED: ICD-10-CM

## 2023-05-23 DIAGNOSIS — M89.9 DISORDER OF BONE, UNSPECIFIED: ICD-10-CM

## 2023-05-23 DIAGNOSIS — Z09 ENCOUNTER FOR FOLLOW-UP EXAMINATION AFTER COMPLETED TREATMENT FOR CONDITIONS OTHER THAN MALIGNANT NEOPLASM: ICD-10-CM

## 2023-05-23 DIAGNOSIS — G89.29 LOW BACK PAIN, UNSPECIFIED: ICD-10-CM

## 2023-05-23 PROCEDURE — 99496 TRANSJ CARE MGMT HIGH F2F 7D: CPT

## 2023-05-23 NOTE — HISTORY OF PRESENT ILLNESS
[Post-hospitalization from ___ Hospital] : Post-hospitalization from [unfilled] Hospital [Admitted on: ___] : The patient was admitted on [unfilled] [Discharged on ___] : discharged on [unfilled] [Discharge Summary] : discharge summary [Pertinent Labs] : pertinent labs [Radiology Findings] : radiology findings [Discharge Med List] : discharge medication list [Med Reconciliation] : medication reconciliation has been completed [Patient Contacted By: ____] : and contacted by [unfilled] [FreeTextEntry2] : 87 years old female seen for hospital discharge follow up, admitted at [facility] for chronic low back pain exacerbated two days prior to admission; she was found to have lumbar compression fracture on CT/MRI and left 6th rib bone lesion, suspicious of malignancy, she had w/u done for multiple myeloma; had follow up yesterday with oncologist DR ADAIR; schedule for bone biopsy on June 9th, she still complaints of lumbar spine pain, on tramadol; discharge summary reviewed, medication reconciliation done; imaging and labs reviewed; she seems in no distress today, accompanied by daughter.

## 2023-05-23 NOTE — PLAN
[FreeTextEntry1] : * discharge summary reviewed\par * medication reconciliation done\par * c/w tramadol as needed for pain\par * to f/u with oncologist DR ADAIR\par * c/w losartan- HCTZ\par * to f/u with spine orthopedic surgeon\par * patient to find out with previous PCP about Pneumovax vaccination and Tdap\par * will call back if necessary to vaccinate\par

## 2023-05-24 ENCOUNTER — APPOINTMENT (OUTPATIENT)
Dept: MAMMOGRAPHY | Facility: CLINIC | Age: 88
End: 2023-05-24
Payer: MEDICARE

## 2023-05-24 ENCOUNTER — APPOINTMENT (OUTPATIENT)
Dept: RADIOLOGY | Facility: CLINIC | Age: 88
End: 2023-05-24
Payer: MEDICARE

## 2023-05-24 ENCOUNTER — APPOINTMENT (OUTPATIENT)
Dept: ULTRASOUND IMAGING | Facility: CLINIC | Age: 88
End: 2023-05-24
Payer: MEDICARE

## 2023-05-24 ENCOUNTER — OUTPATIENT (OUTPATIENT)
Dept: OUTPATIENT SERVICES | Facility: HOSPITAL | Age: 88
LOS: 1 days | End: 2023-05-24
Payer: COMMERCIAL

## 2023-05-24 DIAGNOSIS — S82.892A OTHER FRACTURE OF LEFT LOWER LEG, INITIAL ENCOUNTER FOR CLOSED FRACTURE: Chronic | ICD-10-CM

## 2023-05-24 DIAGNOSIS — Z98.890 OTHER SPECIFIED POSTPROCEDURAL STATES: Chronic | ICD-10-CM

## 2023-05-24 DIAGNOSIS — Z13.820 ENCOUNTER FOR SCREENING FOR OSTEOPOROSIS: ICD-10-CM

## 2023-05-24 DIAGNOSIS — Z90.89 ACQUIRED ABSENCE OF OTHER ORGANS: Chronic | ICD-10-CM

## 2023-05-24 LAB
% ALBUMIN: 47.2 % — SIGNIFICANT CHANGE UP
% ALPHA 1: 4 % — SIGNIFICANT CHANGE UP
% ALPHA 2: 9.7 % — SIGNIFICANT CHANGE UP
% BETA: 34.8 % — SIGNIFICANT CHANGE UP
% GAMMA: 4.3 % — SIGNIFICANT CHANGE UP
% M SPIKE: SIGNIFICANT CHANGE UP
ALBUMIN SERPL ELPH-MCNC: 3.6 G/DL — SIGNIFICANT CHANGE UP (ref 3.6–5.5)
ALBUMIN/GLOB SERPL ELPH: 0.9 RATIO — SIGNIFICANT CHANGE UP
ALPHA1 GLOB SERPL ELPH-MCNC: 0.3 G/DL — SIGNIFICANT CHANGE UP (ref 0.1–0.4)
ALPHA2 GLOB SERPL ELPH-MCNC: 0.7 G/DL — SIGNIFICANT CHANGE UP (ref 0.5–1)
B-GLOBULIN SERPL ELPH-MCNC: 2.6 G/DL — HIGH (ref 0.5–1)
GAMMA GLOBULIN: 0.3 G/DL — LOW (ref 0.6–1.6)
INTERPRETATION SERPL IFE-IMP: SIGNIFICANT CHANGE UP
M-SPIKE: 0 G/DL — SIGNIFICANT CHANGE UP (ref 0–0)
PROT PATTERN SERPL ELPH-IMP: SIGNIFICANT CHANGE UP

## 2023-05-24 PROCEDURE — 77080 DXA BONE DENSITY AXIAL: CPT | Mod: 26

## 2023-05-24 PROCEDURE — 77080 DXA BONE DENSITY AXIAL: CPT

## 2023-05-24 PROCEDURE — 77075 RADEX OSSEOUS SURVEY COMPL: CPT | Mod: 26

## 2023-05-24 PROCEDURE — 77075 RADEX OSSEOUS SURVEY COMPL: CPT

## 2023-05-27 LAB
% GAMMA, URINE: 51.1 % — SIGNIFICANT CHANGE UP
ALBUMIN 24H MFR UR ELPH: 16.8 % — SIGNIFICANT CHANGE UP
ALPHA1 GLOB 24H MFR UR ELPH: 12.3 % — SIGNIFICANT CHANGE UP
ALPHA2 GLOB 24H MFR UR ELPH: 9.7 % — SIGNIFICANT CHANGE UP
B-GLOBULIN 24H MFR UR ELPH: 10.1 % — SIGNIFICANT CHANGE UP
COLLECT DURATION TIME UR: 24 HR — SIGNIFICANT CHANGE UP
INTERPRETATION 24H UR IFE-IMP: SIGNIFICANT CHANGE UP
INTERPRETATION 24H UR IFE-IMP: SIGNIFICANT CHANGE UP
M PROTEIN 24H UR ELPH-MRATE: 29.4 % — SIGNIFICANT CHANGE UP
M PROTEIN 24H UR ELPH-MRATE: 60 MG/DL — SIGNIFICANT CHANGE UP
M PROTEIN 24H UR ELPH-MRATE: 735 MG/24HR — HIGH (ref 0–0)
PROT PATTERN 24H UR ELPH-IMP: SIGNIFICANT CHANGE UP
PROTEIN QUANT CALC, URINE: 2499 MG/24 H — HIGH (ref 50–100)
TOTAL VOLUME - 24 HOUR: 1225 ML — SIGNIFICANT CHANGE UP
URINE CREATININE CALCULATION: 0.5 G/24 H — LOW (ref 0.8–1.8)

## 2023-06-01 NOTE — ASU PATIENT PROFILE, ADULT - PAIN SCALE PREFERRED, PROFILE
----- Message from Janna Ponce MD sent at 8/16/2021  5:30 PM CDT -----  Please mail results.  Labs are stable     none

## 2023-06-01 NOTE — ASU PATIENT PROFILE, ADULT - NSICDXPASTSURGICALHX_GEN_ALL_CORE_FT
PAST SURGICAL HISTORY:  Ankle fracture, left 1980 (Hardware placed)    H/O colonoscopy     H/O endoscopy     History of repair of hiatal hernia Oct 2022    History of tonsillectomy As a child

## 2023-06-01 NOTE — ASU PATIENT PROFILE, ADULT - NSICDXPASTMEDICALHX_GEN_ALL_CORE_FT
PAST MEDICAL HISTORY:  Ankle fracture, left     Back pain with sciatica Sciatica affects LEFT Side    Benign essential hypertension     Chronic cough     Chronic low back pain     Dyspnea on exertion     Fall at home March 2023    Gastro-esophageal reflux disease without esophagitis     GERD (gastroesophageal reflux disease)     Hypertension     Regurgitation of food     Rib fracture right May 2023

## 2023-06-01 NOTE — ASU PATIENT PROFILE, ADULT - FALL HARM RISK - HARM RISK INTERVENTIONS

## 2023-06-05 ENCOUNTER — OUTPATIENT (OUTPATIENT)
Dept: OUTPATIENT SERVICES | Facility: HOSPITAL | Age: 88
LOS: 1 days | End: 2023-06-05
Payer: COMMERCIAL

## 2023-06-05 ENCOUNTER — TRANSCRIPTION ENCOUNTER (OUTPATIENT)
Age: 88
End: 2023-06-05

## 2023-06-05 VITALS
OXYGEN SATURATION: 98 % | RESPIRATION RATE: 16 BRPM | DIASTOLIC BLOOD PRESSURE: 71 MMHG | SYSTOLIC BLOOD PRESSURE: 145 MMHG | HEART RATE: 99 BPM

## 2023-06-05 VITALS
RESPIRATION RATE: 12 BRPM | OXYGEN SATURATION: 97 % | HEART RATE: 97 BPM | DIASTOLIC BLOOD PRESSURE: 67 MMHG | SYSTOLIC BLOOD PRESSURE: 107 MMHG

## 2023-06-05 DIAGNOSIS — C90.00 MULTIPLE MYELOMA NOT HAVING ACHIEVED REMISSION: ICD-10-CM

## 2023-06-05 DIAGNOSIS — Z98.890 OTHER SPECIFIED POSTPROCEDURAL STATES: Chronic | ICD-10-CM

## 2023-06-05 DIAGNOSIS — S82.892A OTHER FRACTURE OF LEFT LOWER LEG, INITIAL ENCOUNTER FOR CLOSED FRACTURE: Chronic | ICD-10-CM

## 2023-06-05 DIAGNOSIS — Z90.89 ACQUIRED ABSENCE OF OTHER ORGANS: Chronic | ICD-10-CM

## 2023-06-05 PROCEDURE — 88291 CYTO/MOLECULAR REPORT: CPT

## 2023-06-05 PROCEDURE — 88300 SURGICAL PATH GROSS: CPT | Mod: 26,59

## 2023-06-05 PROCEDURE — 77012 CT SCAN FOR NEEDLE BIOPSY: CPT | Mod: 26

## 2023-06-05 PROCEDURE — 20225 BONE BIOPSY TROCAR/NDL DEEP: CPT

## 2023-06-05 PROCEDURE — 88365 INSITU HYBRIDIZATION (FISH): CPT | Mod: 26

## 2023-06-05 PROCEDURE — 88312 SPECIAL STAINS GROUP 1: CPT | Mod: 26

## 2023-06-05 PROCEDURE — 88313 SPECIAL STAINS GROUP 2: CPT | Mod: 26

## 2023-06-05 PROCEDURE — 88364 INSITU HYBRIDIZATION (FISH): CPT | Mod: 26

## 2023-06-05 PROCEDURE — 88341 IMHCHEM/IMCYTCHM EA ADD ANTB: CPT | Mod: 26

## 2023-06-05 PROCEDURE — 88342 IMHCHEM/IMCYTCHM 1ST ANTB: CPT | Mod: 26,59

## 2023-06-05 PROCEDURE — 88305 TISSUE EXAM BY PATHOLOGIST: CPT | Mod: 26

## 2023-06-05 PROCEDURE — 85097 BONE MARROW INTERPRETATION: CPT

## 2023-06-05 RX ORDER — FENTANYL CITRATE 50 UG/ML
100 INJECTION INTRAVENOUS ONCE
Refills: 0 | Status: DISCONTINUED | OUTPATIENT
Start: 2023-06-05 | End: 2023-06-05

## 2023-06-05 RX ORDER — SODIUM CHLORIDE 9 MG/ML
250 INJECTION INTRAMUSCULAR; INTRAVENOUS; SUBCUTANEOUS ONCE
Refills: 0 | Status: DISCONTINUED | OUTPATIENT
Start: 2023-06-05 | End: 2023-06-19

## 2023-06-05 RX ORDER — MIDAZOLAM HYDROCHLORIDE 1 MG/ML
2 INJECTION, SOLUTION INTRAMUSCULAR; INTRAVENOUS ONCE
Refills: 0 | Status: DISCONTINUED | OUTPATIENT
Start: 2023-06-05 | End: 2023-06-05

## 2023-06-05 RX ORDER — DIPHENHYDRAMINE HCL 50 MG
25 CAPSULE ORAL ONCE
Refills: 0 | Status: COMPLETED | OUTPATIENT
Start: 2023-06-05 | End: 2023-06-05

## 2023-06-05 RX ADMIN — Medication 25 MILLIGRAM(S): at 12:47

## 2023-06-05 RX ADMIN — FENTANYL CITRATE 100 MICROGRAM(S): 50 INJECTION INTRAVENOUS at 11:00

## 2023-06-05 RX ADMIN — MIDAZOLAM HYDROCHLORIDE 2 MILLIGRAM(S): 1 INJECTION, SOLUTION INTRAMUSCULAR; INTRAVENOUS at 11:00

## 2023-06-05 NOTE — H&P PST ADULT - HISTORY OF PRESENT ILLNESS
Interventional Radiology Pre-Procedure Note    87 year old female with concern for multiple myeloma referred for bone marrow biopsy.    Patient's daughter states she has persistent chronic itchiness with rash for which she takes benadryl intermittently, and needs to follow up with primary care doctor.    Patient and patient's daughter consented for procedure with IV sedation. Risks, benefits, alternatives discussed.    ASA 3. Mallampati score 2.    PAST MEDICAL & SURGICAL HISTORY:  Hypertension      GERD (gastroesophageal reflux disease)      Ankle fracture, left      Gastro-esophageal reflux disease without esophagitis      Benign essential hypertension      Chronic cough      Chronic low back pain      Dyspnea on exertion      Back pain with sciatica  Sciatica affects LEFT Side      Regurgitation of food      Rib fracture  right May 2023      Fall at home  March 2023      Ankle fracture, left  1980 (Hardware placed)      H/O endoscopy      H/O colonoscopy      History of tonsillectomy  As a child      History of repair of hiatal hernia  Oct 2022               Assessment / Plan:    87 year old female with concern for multiple myeloma referred for bone marrow biopsy.    Patient's daughter states she has persistent chronic itchiness with rash for which she takes benadryl intermittently, and needs to follow up with primary care doctor.    Patient and patient's daughter consented for procedure with IV sedation. Risks, benefits, alternatives discussed.    ASA 3. Mallampati score 2.

## 2023-06-06 PROCEDURE — 88300 SURGICAL PATH GROSS: CPT

## 2023-06-06 PROCEDURE — 88365 INSITU HYBRIDIZATION (FISH): CPT

## 2023-06-06 PROCEDURE — 88341 IMHCHEM/IMCYTCHM EA ADD ANTB: CPT

## 2023-06-06 PROCEDURE — 87205 SMEAR GRAM STAIN: CPT

## 2023-06-06 PROCEDURE — 88285 CHROMOSOME COUNT ADDITIONAL: CPT

## 2023-06-06 PROCEDURE — 88280 CHROMOSOME KARYOTYPE STUDY: CPT

## 2023-06-06 PROCEDURE — 88364 INSITU HYBRIDIZATION (FISH): CPT

## 2023-06-06 PROCEDURE — 85097 BONE MARROW INTERPRETATION: CPT

## 2023-06-06 PROCEDURE — 77012 CT SCAN FOR NEEDLE BIOPSY: CPT

## 2023-06-06 PROCEDURE — 88184 FLOWCYTOMETRY/ TC 1 MARKER: CPT

## 2023-06-06 PROCEDURE — 88237 TISSUE CULTURE BONE MARROW: CPT

## 2023-06-06 PROCEDURE — 88264 CHROMOSOME ANALYSIS 20-25: CPT

## 2023-06-06 PROCEDURE — 20225 BONE BIOPSY TROCAR/NDL DEEP: CPT

## 2023-06-06 PROCEDURE — C1830: CPT

## 2023-06-06 PROCEDURE — 88360 TUMOR IMMUNOHISTOCHEM/MANUAL: CPT

## 2023-06-06 PROCEDURE — 88305 TISSUE EXAM BY PATHOLOGIST: CPT

## 2023-06-06 PROCEDURE — 88275 CYTOGENETICS 100-300: CPT

## 2023-06-06 PROCEDURE — 88312 SPECIAL STAINS GROUP 1: CPT

## 2023-06-06 PROCEDURE — 88342 IMHCHEM/IMCYTCHM 1ST ANTB: CPT

## 2023-06-06 PROCEDURE — 88185 FLOWCYTOMETRY/TC ADD-ON: CPT

## 2023-06-06 PROCEDURE — 88271 CYTOGENETICS DNA PROBE: CPT

## 2023-06-06 PROCEDURE — 88313 SPECIAL STAINS GROUP 2: CPT

## 2023-06-19 ENCOUNTER — OUTPATIENT (OUTPATIENT)
Dept: OUTPATIENT SERVICES | Facility: HOSPITAL | Age: 88
LOS: 1 days | End: 2023-06-19
Payer: COMMERCIAL

## 2023-06-19 DIAGNOSIS — Z98.890 OTHER SPECIFIED POSTPROCEDURAL STATES: Chronic | ICD-10-CM

## 2023-06-19 DIAGNOSIS — D63.8 ANEMIA IN OTHER CHRONIC DISEASES CLASSIFIED ELSEWHERE: ICD-10-CM

## 2023-06-19 DIAGNOSIS — S82.892A OTHER FRACTURE OF LEFT LOWER LEG, INITIAL ENCOUNTER FOR CLOSED FRACTURE: Chronic | ICD-10-CM

## 2023-06-19 DIAGNOSIS — D80.1 NONFAMILIAL HYPOGAMMAGLOBULINEMIA: ICD-10-CM

## 2023-06-19 DIAGNOSIS — C90.00 MULTIPLE MYELOMA NOT HAVING ACHIEVED REMISSION: ICD-10-CM

## 2023-06-19 DIAGNOSIS — E83.52 HYPERCALCEMIA: ICD-10-CM

## 2023-06-19 DIAGNOSIS — Z90.89 ACQUIRED ABSENCE OF OTHER ORGANS: Chronic | ICD-10-CM
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
## 2023-06-19 PROCEDURE — 86900 BLOOD TYPING SEROLOGIC ABO: CPT

## 2023-06-19 PROCEDURE — 86850 RBC ANTIBODY SCREEN: CPT

## 2023-06-19 PROCEDURE — 86901 BLOOD TYPING SEROLOGIC RH(D): CPT

## 2023-06-19 PROCEDURE — 0001U RBC DNA HEA 35 AG 11 BLD GRP: CPT

## 2023-06-19 PROCEDURE — 36415 COLL VENOUS BLD VENIPUNCTURE: CPT

## 2023-06-30 LAB — HUMAN ERYTHROCYTE ANTIGEN PANEL RESULT: SIGNIFICANT CHANGE UP

## 2023-07-04 PROBLEM — S22.39XA FRACTURE OF ONE RIB, UNSPECIFIED SIDE, INITIAL ENCOUNTER FOR CLOSED FRACTURE: Chronic | Status: ACTIVE | Noted: 2023-06-01

## 2023-07-26 ENCOUNTER — INPATIENT (INPATIENT)
Facility: HOSPITAL | Age: 88
LOS: 1 days | Discharge: HOME | DRG: 543 | End: 2023-07-28
Attending: INTERNAL MEDICINE | Admitting: INTERNAL MEDICINE
Payer: COMMERCIAL

## 2023-07-26 VITALS
HEIGHT: 59 IN | SYSTOLIC BLOOD PRESSURE: 130 MMHG | RESPIRATION RATE: 22 BRPM | WEIGHT: 145.95 LBS | HEART RATE: 75 BPM | DIASTOLIC BLOOD PRESSURE: 71 MMHG | OXYGEN SATURATION: 98 %

## 2023-07-26 DIAGNOSIS — Z98.890 OTHER SPECIFIED POSTPROCEDURAL STATES: Chronic | ICD-10-CM

## 2023-07-26 DIAGNOSIS — Z90.89 ACQUIRED ABSENCE OF OTHER ORGANS: Chronic | ICD-10-CM

## 2023-07-26 DIAGNOSIS — S82.892A OTHER FRACTURE OF LEFT LOWER LEG, INITIAL ENCOUNTER FOR CLOSED FRACTURE: Chronic | ICD-10-CM

## 2023-07-26 LAB
ALBUMIN SERPL ELPH-MCNC: 3.3 G/DL — SIGNIFICANT CHANGE UP (ref 3.3–5)
ALP SERPL-CCNC: 121 U/L — HIGH (ref 40–120)
ALT FLD-CCNC: 37 U/L — SIGNIFICANT CHANGE UP (ref 12–78)
ANION GAP SERPL CALC-SCNC: 8 MMOL/L — SIGNIFICANT CHANGE UP (ref 5–17)
AST SERPL-CCNC: 25 U/L — SIGNIFICANT CHANGE UP (ref 15–37)
BASOPHILS # BLD AUTO: 0.06 K/UL — SIGNIFICANT CHANGE UP (ref 0–0.2)
BASOPHILS NFR BLD AUTO: 1 % — SIGNIFICANT CHANGE UP (ref 0–2)
BILIRUB SERPL-MCNC: 0.3 MG/DL — SIGNIFICANT CHANGE UP (ref 0.2–1.2)
BUN SERPL-MCNC: 18 MG/DL — SIGNIFICANT CHANGE UP (ref 7–23)
CALCIUM SERPL-MCNC: 8.4 MG/DL — LOW (ref 8.5–10.1)
CHLORIDE SERPL-SCNC: 96 MMOL/L — SIGNIFICANT CHANGE UP (ref 96–108)
CO2 SERPL-SCNC: 23 MMOL/L — SIGNIFICANT CHANGE UP (ref 22–31)
CREAT SERPL-MCNC: 0.89 MG/DL — SIGNIFICANT CHANGE UP (ref 0.5–1.3)
EGFR: 63 ML/MIN/1.73M2 — SIGNIFICANT CHANGE UP
EOSINOPHIL # BLD AUTO: 0.18 K/UL — SIGNIFICANT CHANGE UP (ref 0–0.5)
EOSINOPHIL NFR BLD AUTO: 3 % — SIGNIFICANT CHANGE UP (ref 0–6)
GLUCOSE SERPL-MCNC: 109 MG/DL — HIGH (ref 70–99)
HCT VFR BLD CALC: 30.1 % — LOW (ref 34.5–45)
HGB BLD-MCNC: 9.8 G/DL — LOW (ref 11.5–15.5)
LIDOCAIN IGE QN: 114 U/L — SIGNIFICANT CHANGE UP (ref 73–393)
LYMPHOCYTES # BLD AUTO: 1.53 K/UL — SIGNIFICANT CHANGE UP (ref 1–3.3)
LYMPHOCYTES # BLD AUTO: 26 % — SIGNIFICANT CHANGE UP (ref 13–44)
MCHC RBC-ENTMCNC: 27.7 PG — SIGNIFICANT CHANGE UP (ref 27–34)
MCHC RBC-ENTMCNC: 32.6 GM/DL — SIGNIFICANT CHANGE UP (ref 32–36)
MCV RBC AUTO: 85 FL — SIGNIFICANT CHANGE UP (ref 80–100)
MONOCYTES # BLD AUTO: 0.71 K/UL — SIGNIFICANT CHANGE UP (ref 0–0.9)
MONOCYTES NFR BLD AUTO: 12 % — SIGNIFICANT CHANGE UP (ref 2–14)
NEUTROPHILS # BLD AUTO: 3.41 K/UL — SIGNIFICANT CHANGE UP (ref 1.8–7.4)
NEUTROPHILS NFR BLD AUTO: 57 % — SIGNIFICANT CHANGE UP (ref 43–77)
NRBC # BLD: SIGNIFICANT CHANGE UP /100 WBCS (ref 0–0)
PLATELET # BLD AUTO: 244 K/UL — SIGNIFICANT CHANGE UP (ref 150–400)
POTASSIUM SERPL-MCNC: 3.8 MMOL/L — SIGNIFICANT CHANGE UP (ref 3.5–5.3)
POTASSIUM SERPL-SCNC: 3.8 MMOL/L — SIGNIFICANT CHANGE UP (ref 3.5–5.3)
PROT SERPL-MCNC: 6.6 G/DL — SIGNIFICANT CHANGE UP (ref 6–8.3)
RBC # BLD: 3.54 M/UL — LOW (ref 3.8–5.2)
RBC # FLD: 15.8 % — HIGH (ref 10.3–14.5)
SODIUM SERPL-SCNC: 127 MMOL/L — LOW (ref 135–145)
TROPONIN I, HIGH SENSITIVITY RESULT: 9.8 NG/L — SIGNIFICANT CHANGE UP
WBC # BLD: 5.88 K/UL — SIGNIFICANT CHANGE UP (ref 3.8–10.5)
WBC # FLD AUTO: 5.88 K/UL — SIGNIFICANT CHANGE UP (ref 3.8–10.5)

## 2023-07-26 PROCEDURE — 99285 EMERGENCY DEPT VISIT HI MDM: CPT

## 2023-07-26 RX ORDER — SODIUM CHLORIDE 9 MG/ML
1000 INJECTION INTRAMUSCULAR; INTRAVENOUS; SUBCUTANEOUS ONCE
Refills: 0 | Status: COMPLETED | OUTPATIENT
Start: 2023-07-26 | End: 2023-07-26

## 2023-07-26 RX ORDER — ONDANSETRON 8 MG/1
4 TABLET, FILM COATED ORAL ONCE
Refills: 0 | Status: COMPLETED | OUTPATIENT
Start: 2023-07-26 | End: 2023-07-26

## 2023-07-26 RX ORDER — HYDROMORPHONE HYDROCHLORIDE 2 MG/ML
0.5 INJECTION INTRAMUSCULAR; INTRAVENOUS; SUBCUTANEOUS ONCE
Refills: 0 | Status: DISCONTINUED | OUTPATIENT
Start: 2023-07-26 | End: 2023-07-26

## 2023-07-26 RX ADMIN — HYDROMORPHONE HYDROCHLORIDE 0.5 MILLIGRAM(S): 2 INJECTION INTRAMUSCULAR; INTRAVENOUS; SUBCUTANEOUS at 22:12

## 2023-07-26 RX ADMIN — ONDANSETRON 4 MILLIGRAM(S): 8 TABLET, FILM COATED ORAL at 22:12

## 2023-07-26 RX ADMIN — SODIUM CHLORIDE 1000 MILLILITER(S): 9 INJECTION INTRAMUSCULAR; INTRAVENOUS; SUBCUTANEOUS at 22:12

## 2023-07-26 NOTE — ED ADULT TRIAGE NOTE - CHIEF COMPLAINT QUOTE
Patient placed in wheelchair upon arrival to triage.  Patient complains of shortness of breath.  Patient has back pain.  Patient suffers from multiple myelinoma.

## 2023-07-27 ENCOUNTER — TRANSCRIPTION ENCOUNTER (OUTPATIENT)
Age: 88
End: 2023-07-27

## 2023-07-27 DIAGNOSIS — C90.00 MULTIPLE MYELOMA NOT HAVING ACHIEVED REMISSION: ICD-10-CM

## 2023-07-27 DIAGNOSIS — I10 ESSENTIAL (PRIMARY) HYPERTENSION: ICD-10-CM

## 2023-07-27 DIAGNOSIS — S32.000A WEDGE COMPRESSION FRACTURE OF UNSPECIFIED LUMBAR VERTEBRA, INITIAL ENCOUNTER FOR CLOSED FRACTURE: ICD-10-CM

## 2023-07-27 DIAGNOSIS — E87.1 HYPO-OSMOLALITY AND HYPONATREMIA: ICD-10-CM

## 2023-07-27 DIAGNOSIS — Z29.9 ENCOUNTER FOR PROPHYLACTIC MEASURES, UNSPECIFIED: ICD-10-CM

## 2023-07-27 DIAGNOSIS — D64.9 ANEMIA, UNSPECIFIED: ICD-10-CM

## 2023-07-27 LAB
ALBUMIN SERPL ELPH-MCNC: 2.9 G/DL — LOW (ref 3.3–5)
ALP SERPL-CCNC: 95 U/L — SIGNIFICANT CHANGE UP (ref 40–120)
ALT FLD-CCNC: 30 U/L — SIGNIFICANT CHANGE UP (ref 12–78)
ANION GAP SERPL CALC-SCNC: 5 MMOL/L — SIGNIFICANT CHANGE UP (ref 5–17)
APPEARANCE UR: CLEAR — SIGNIFICANT CHANGE UP
AST SERPL-CCNC: 15 U/L — SIGNIFICANT CHANGE UP (ref 15–37)
BASOPHILS # BLD AUTO: 0.07 K/UL — SIGNIFICANT CHANGE UP (ref 0–0.2)
BASOPHILS NFR BLD AUTO: 1.5 % — SIGNIFICANT CHANGE UP (ref 0–2)
BILIRUB SERPL-MCNC: 0.3 MG/DL — SIGNIFICANT CHANGE UP (ref 0.2–1.2)
BILIRUB UR-MCNC: NEGATIVE — SIGNIFICANT CHANGE UP
BUN SERPL-MCNC: 16 MG/DL — SIGNIFICANT CHANGE UP (ref 7–23)
CALCIUM SERPL-MCNC: 8 MG/DL — LOW (ref 8.5–10.1)
CHLORIDE SERPL-SCNC: 98 MMOL/L — SIGNIFICANT CHANGE UP (ref 96–108)
CO2 SERPL-SCNC: 26 MMOL/L — SIGNIFICANT CHANGE UP (ref 22–31)
COLOR SPEC: YELLOW — SIGNIFICANT CHANGE UP
CREAT SERPL-MCNC: 0.69 MG/DL — SIGNIFICANT CHANGE UP (ref 0.5–1.3)
CRP SERPL-MCNC: 4 MG/L — SIGNIFICANT CHANGE UP
DIFF PNL FLD: NEGATIVE — SIGNIFICANT CHANGE UP
EGFR: 84 ML/MIN/1.73M2 — SIGNIFICANT CHANGE UP
EOSINOPHIL # BLD AUTO: 0.44 K/UL — SIGNIFICANT CHANGE UP (ref 0–0.5)
EOSINOPHIL NFR BLD AUTO: 9.1 % — HIGH (ref 0–6)
ERYTHROCYTE [SEDIMENTATION RATE] IN BLOOD: 14 MM/HR — SIGNIFICANT CHANGE UP (ref 0–20)
GLUCOSE SERPL-MCNC: 100 MG/DL — HIGH (ref 70–99)
GLUCOSE UR QL: NEGATIVE MG/DL — SIGNIFICANT CHANGE UP
HCT VFR BLD CALC: 27.1 % — LOW (ref 34.5–45)
HGB BLD-MCNC: 9 G/DL — LOW (ref 11.5–15.5)
IMM GRANULOCYTES NFR BLD AUTO: 0.6 % — SIGNIFICANT CHANGE UP (ref 0–0.9)
KETONES UR-MCNC: NEGATIVE MG/DL — SIGNIFICANT CHANGE UP
LDH SERPL L TO P-CCNC: 275 U/L — HIGH (ref 50–242)
LEUKOCYTE ESTERASE UR-ACNC: NEGATIVE — SIGNIFICANT CHANGE UP
LYMPHOCYTES # BLD AUTO: 1.14 K/UL — SIGNIFICANT CHANGE UP (ref 1–3.3)
LYMPHOCYTES # BLD AUTO: 23.7 % — SIGNIFICANT CHANGE UP (ref 13–44)
MCHC RBC-ENTMCNC: 28 PG — SIGNIFICANT CHANGE UP (ref 27–34)
MCHC RBC-ENTMCNC: 33.2 GM/DL — SIGNIFICANT CHANGE UP (ref 32–36)
MCV RBC AUTO: 84.4 FL — SIGNIFICANT CHANGE UP (ref 80–100)
MONOCYTES # BLD AUTO: 0.9 K/UL — SIGNIFICANT CHANGE UP (ref 0–0.9)
MONOCYTES NFR BLD AUTO: 18.7 % — HIGH (ref 2–14)
NEUTROPHILS # BLD AUTO: 2.24 K/UL — SIGNIFICANT CHANGE UP (ref 1.8–7.4)
NEUTROPHILS NFR BLD AUTO: 46.4 % — SIGNIFICANT CHANGE UP (ref 43–77)
NITRITE UR-MCNC: NEGATIVE — SIGNIFICANT CHANGE UP
NRBC # BLD: 0 /100 WBCS — SIGNIFICANT CHANGE UP (ref 0–0)
PH UR: 7 — SIGNIFICANT CHANGE UP (ref 5–8)
PLATELET # BLD AUTO: 211 K/UL — SIGNIFICANT CHANGE UP (ref 150–400)
POTASSIUM SERPL-MCNC: 4.7 MMOL/L — SIGNIFICANT CHANGE UP (ref 3.5–5.3)
POTASSIUM SERPL-SCNC: 4.7 MMOL/L — SIGNIFICANT CHANGE UP (ref 3.5–5.3)
PROT SERPL-MCNC: 5.7 G/DL — LOW (ref 6–8.3)
PROT UR-MCNC: SIGNIFICANT CHANGE UP MG/DL
RBC # BLD: 3.21 M/UL — LOW (ref 3.8–5.2)
RBC # FLD: 15.8 % — HIGH (ref 10.3–14.5)
SODIUM SERPL-SCNC: 129 MMOL/L — LOW (ref 135–145)
SP GR SPEC: 1.04 — HIGH (ref 1–1.03)
UROBILINOGEN FLD QL: 0.2 MG/DL — SIGNIFICANT CHANGE UP (ref 0.2–1)
WBC # BLD: 4.82 K/UL — SIGNIFICANT CHANGE UP (ref 3.8–10.5)
WBC # FLD AUTO: 4.82 K/UL — SIGNIFICANT CHANGE UP (ref 3.8–10.5)

## 2023-07-27 PROCEDURE — 71275 CT ANGIOGRAPHY CHEST: CPT | Mod: 26,MA

## 2023-07-27 PROCEDURE — 93010 ELECTROCARDIOGRAM REPORT: CPT

## 2023-07-27 PROCEDURE — 72157 MRI CHEST SPINE W/O & W/DYE: CPT | Mod: 26

## 2023-07-27 PROCEDURE — 72100 X-RAY EXAM L-S SPINE 2/3 VWS: CPT | Mod: 26

## 2023-07-27 PROCEDURE — 74177 CT ABD & PELVIS W/CONTRAST: CPT | Mod: 26,MA

## 2023-07-27 PROCEDURE — 72158 MRI LUMBAR SPINE W/O & W/DYE: CPT | Mod: 26

## 2023-07-27 PROCEDURE — 77075 RADEX OSSEOUS SURVEY COMPL: CPT | Mod: 26

## 2023-07-27 PROCEDURE — 72156 MRI NECK SPINE W/O & W/DYE: CPT | Mod: 26

## 2023-07-27 PROCEDURE — 72131 CT LUMBAR SPINE W/O DYE: CPT | Mod: 26,MA

## 2023-07-27 RX ORDER — ENOXAPARIN SODIUM 100 MG/ML
40 INJECTION SUBCUTANEOUS EVERY 24 HOURS
Refills: 0 | Status: DISCONTINUED | OUTPATIENT
Start: 2023-07-27 | End: 2023-07-28

## 2023-07-27 RX ORDER — HYDROMORPHONE HYDROCHLORIDE 2 MG/ML
0.5 INJECTION INTRAMUSCULAR; INTRAVENOUS; SUBCUTANEOUS EVERY 6 HOURS
Refills: 0 | Status: DISCONTINUED | OUTPATIENT
Start: 2023-07-27 | End: 2023-07-28

## 2023-07-27 RX ORDER — ONDANSETRON 8 MG/1
4 TABLET, FILM COATED ORAL EVERY 8 HOURS
Refills: 0 | Status: DISCONTINUED | OUTPATIENT
Start: 2023-07-27 | End: 2023-07-28

## 2023-07-27 RX ORDER — ACETAMINOPHEN 500 MG
1000 TABLET ORAL ONCE
Refills: 0 | Status: COMPLETED | OUTPATIENT
Start: 2023-07-27 | End: 2023-07-27

## 2023-07-27 RX ORDER — LANOLIN ALCOHOL/MO/W.PET/CERES
3 CREAM (GRAM) TOPICAL AT BEDTIME
Refills: 0 | Status: DISCONTINUED | OUTPATIENT
Start: 2023-07-27 | End: 2023-07-28

## 2023-07-27 RX ORDER — HYDROMORPHONE HYDROCHLORIDE 2 MG/ML
0.25 INJECTION INTRAMUSCULAR; INTRAVENOUS; SUBCUTANEOUS EVERY 6 HOURS
Refills: 0 | Status: DISCONTINUED | OUTPATIENT
Start: 2023-07-27 | End: 2023-07-28

## 2023-07-27 RX ORDER — SODIUM CHLORIDE 9 MG/ML
1 INJECTION INTRAMUSCULAR; INTRAVENOUS; SUBCUTANEOUS EVERY 6 HOURS
Refills: 0 | Status: COMPLETED | OUTPATIENT
Start: 2023-07-27 | End: 2023-07-27

## 2023-07-27 RX ORDER — SENNA PLUS 8.6 MG/1
2 TABLET ORAL AT BEDTIME
Refills: 0 | Status: DISCONTINUED | OUTPATIENT
Start: 2023-07-27 | End: 2023-07-28

## 2023-07-27 RX ORDER — POLYETHYLENE GLYCOL 3350 17 G/17G
17 POWDER, FOR SOLUTION ORAL DAILY
Refills: 0 | Status: DISCONTINUED | OUTPATIENT
Start: 2023-07-27 | End: 2023-07-28

## 2023-07-27 RX ORDER — LENALIDOMIDE 5 MG/1
25 CAPSULE ORAL DAILY
Refills: 0 | Status: DISCONTINUED | OUTPATIENT
Start: 2023-07-27 | End: 2023-07-27

## 2023-07-27 RX ORDER — ACYCLOVIR SODIUM 500 MG
400 VIAL (EA) INTRAVENOUS
Refills: 0 | Status: DISCONTINUED | OUTPATIENT
Start: 2023-07-27 | End: 2023-07-28

## 2023-07-27 RX ORDER — LOSARTAN POTASSIUM 100 MG/1
0.5 TABLET, FILM COATED ORAL
Refills: 0 | DISCHARGE

## 2023-07-27 RX ORDER — LOSARTAN POTASSIUM 100 MG/1
12.5 TABLET, FILM COATED ORAL DAILY
Refills: 0 | Status: DISCONTINUED | OUTPATIENT
Start: 2023-07-27 | End: 2023-07-28

## 2023-07-27 RX ORDER — MORPHINE SULFATE 50 MG/1
2 CAPSULE, EXTENDED RELEASE ORAL EVERY 6 HOURS
Refills: 0 | Status: DISCONTINUED | OUTPATIENT
Start: 2023-07-27 | End: 2023-07-27

## 2023-07-27 RX ORDER — HYDROMORPHONE HYDROCHLORIDE 2 MG/ML
0.5 INJECTION INTRAMUSCULAR; INTRAVENOUS; SUBCUTANEOUS ONCE
Refills: 0 | Status: DISCONTINUED | OUTPATIENT
Start: 2023-07-27 | End: 2023-07-27

## 2023-07-27 RX ORDER — HYDROMORPHONE HYDROCHLORIDE 2 MG/ML
0.5 INJECTION INTRAMUSCULAR; INTRAVENOUS; SUBCUTANEOUS EVERY 6 HOURS
Refills: 0 | Status: DISCONTINUED | OUTPATIENT
Start: 2023-07-27 | End: 2023-07-27

## 2023-07-27 RX ADMIN — ONDANSETRON 4 MILLIGRAM(S): 8 TABLET, FILM COATED ORAL at 13:39

## 2023-07-27 RX ADMIN — SODIUM CHLORIDE 1 GRAM(S): 9 INJECTION INTRAMUSCULAR; INTRAVENOUS; SUBCUTANEOUS at 23:40

## 2023-07-27 RX ADMIN — HYDROMORPHONE HYDROCHLORIDE 0.5 MILLIGRAM(S): 2 INJECTION INTRAMUSCULAR; INTRAVENOUS; SUBCUTANEOUS at 17:47

## 2023-07-27 RX ADMIN — MORPHINE SULFATE 2 MILLIGRAM(S): 50 CAPSULE, EXTENDED RELEASE ORAL at 12:53

## 2023-07-27 RX ADMIN — MORPHINE SULFATE 2 MILLIGRAM(S): 50 CAPSULE, EXTENDED RELEASE ORAL at 13:25

## 2023-07-27 RX ADMIN — Medication 1000 MILLIGRAM(S): at 22:00

## 2023-07-27 RX ADMIN — HYDROMORPHONE HYDROCHLORIDE 0.5 MILLIGRAM(S): 2 INJECTION INTRAMUSCULAR; INTRAVENOUS; SUBCUTANEOUS at 02:37

## 2023-07-27 RX ADMIN — Medication 400 MILLIGRAM(S): at 18:02

## 2023-07-27 RX ADMIN — Medication 400 MILLIGRAM(S): at 21:00

## 2023-07-27 RX ADMIN — LOSARTAN POTASSIUM 12.5 MILLIGRAM(S): 100 TABLET, FILM COATED ORAL at 08:41

## 2023-07-27 RX ADMIN — ENOXAPARIN SODIUM 40 MILLIGRAM(S): 100 INJECTION SUBCUTANEOUS at 17:47

## 2023-07-27 RX ADMIN — Medication 400 MILLIGRAM(S): at 08:41

## 2023-07-27 RX ADMIN — SODIUM CHLORIDE 1 GRAM(S): 9 INJECTION INTRAMUSCULAR; INTRAVENOUS; SUBCUTANEOUS at 17:45

## 2023-07-27 RX ADMIN — SENNA PLUS 2 TABLET(S): 8.6 TABLET ORAL at 21:00

## 2023-07-27 RX ADMIN — HYDROMORPHONE HYDROCHLORIDE 0.5 MILLIGRAM(S): 2 INJECTION INTRAMUSCULAR; INTRAVENOUS; SUBCUTANEOUS at 06:41

## 2023-07-27 RX ADMIN — HYDROMORPHONE HYDROCHLORIDE 0.5 MILLIGRAM(S): 2 INJECTION INTRAMUSCULAR; INTRAVENOUS; SUBCUTANEOUS at 18:10

## 2023-07-27 NOTE — ED ADULT NURSE NOTE - OBJECTIVE STATEMENT
pt reporting 10/10 lower back pain since sunday. labored breathing d/t pain. limited ROM, pt needing assistance from wheelchair into stretcher. pain increasing with movement, + spasms. hx of multiple myeloma, prescribe oxycodone, pt took at home with no relief. daughter at bedside.

## 2023-07-27 NOTE — H&P ADULT - CARDIOVASCULAR
normal/regular rate and rhythm/S1 S2 present/no gallops/no rub/no murmur normal/regular rate and rhythm/S1 S2 present/no gallops/no rub/no murmur/no JVD/no pedal edema details…

## 2023-07-27 NOTE — PROGRESS NOTE ADULT - PROBLEM SELECTOR PLAN 3
Likely anemia of chronic disease 2/2 MM  - Hgb 9.8 on admission, baseline Hgb ~9-10 per chart review  - No signs/symptoms of bleeding  - Transfuse for Hgb <7 Likely anemia of chronic disease 2/2 MM  - Hgb 9.8 on admission, (baseline Hgb ~9-10 per chart review) -> Hgb 9 today.  - No signs/symptoms of bleeding  - Transfuse for Hgb <7 Likely anemia of chronic disease 2/2 MM & Chemo Rx   - Hgb 9.8 on admission, (baseline Hgb ~9-10 per chart review) -> Hgb 9 today.  - No signs/symptoms of bleeding  - Transfuse for Hgb <7  CBC in AM

## 2023-07-27 NOTE — ED ADULT NURSE REASSESSMENT NOTE - NS ED NURSE REASSESS COMMENT FT1
pt reporting 10/10 back pain, unable to complete EKG d/t pt restlessness. MD made aware, pt medicated as per orders. safety measures maintained, side rails up x2.

## 2023-07-27 NOTE — H&P ADULT - PROBLEM SELECTOR PLAN 1
Lumbar compression fracture likely 2/2 MM, pt admits to severe LBP worsening since Sun 7/23, denies inciting fall or trauma   - CT chest: Expansile lucent/lytic lateral L 6th rib lesion compatible with reported history of multiple myeloma  - CT Lumbar spine: Multilevel chronic vertebral body compression deformities at T11, L1, and L4. Superimposed acute or subacute components of the T11 and L1 fractures is not excluded  - Given: IV Dilaudid 0.5mg x2, IV Zofran x1, 1L IV NS bolus x1 in ED  - Pain regimen: IV Tylenol for mild pain, IV Dilaudid 0.5mg for moderate pain, IV Morphine 2mg for severe pain   - Ortho consulted, f/u recs Lumbar compression fracture likely 2/2 MM, pt admits to severe LBP worsening since Sun 7/23, denies inciting fall or trauma   - CT chest: Expansile lucent/lytic lateral L 6th rib lesion compatible with reported history of multiple myeloma  - CT Lumbar spine: Multilevel chronic vertebral body compression deformities at T11, L1, and L4. Superimposed acute or subacute components of the T11 and L1 fractures is not excluded  - Given: IV Dilaudid 0.5mg x2, IV Zofran x1, 1L IV NS bolus x1 in ED  - Pain regimen: IV Dilaudid 0.5mg for moderate pain, IV Morphine 2mg for severe pain   - Ortho consulted, f/u recs Lower Back pain -Lumbar compression fracture likely  Pathological 2/2 MM & Osteoporosis , pt admits to severe LBP worsening since Sun 7/23, denies inciting fall or trauma   - CT chest: Expansile lucent/lytic lateral L 6th rib lesion compatible with reported history of multiple myeloma  - CT Lumbar spine: Multilevel chronic vertebral body compression deformities at T11, L1, and L4. Superimposed acute or subacute components of the T11 and L1 fractures is not excluded  - Given: IV Dilaudid 0.5mg x2, IV Zofran x1, 1L IV NS bolus x1 in ED  - Pain regimen: IV Dilaudid 0.5mg for moderate pain, IV Morphine 2mg for severe pain   - Ortho consulted, f/u recs  -BRACE, PT Eval

## 2023-07-27 NOTE — DISCHARGE NOTE PROVIDER - NSDCCPCAREPLAN_GEN_ALL_CORE_FT
PRINCIPAL DISCHARGE DIAGNOSIS  Diagnosis: Lumbar compression fracture  Assessment and Plan of Treatment: You were admitted to the hospital for fractures found in lower spine. We believe this is due to your multiple myeloma. Orthopedics was following and recommended a TLSO brace to help you when you move. We controlled pain with ***      SECONDARY DISCHARGE DIAGNOSES  Diagnosis: Multiple myeloma  Assessment and Plan of Treatment: You have a known diagnosis of Multiple Myeloma. Our hematologist/oncologist ***    Diagnosis: Hyponatremia  Assessment and Plan of Treatment: You were found to have low sodium levels in the hospital. We had our nephrologist follow you and recommended decreasing your water intake ***     PRINCIPAL DISCHARGE DIAGNOSIS  Diagnosis: Lumbar compression fracture  Assessment and Plan of Treatment: You were admitted to the hospital for fractures found in the lower spine. We believe this is due to your multiple myeloma. Orthopedics was following and recommended a TLSO brace to help you when you move.   Continue using your TLSO brace.  Please follow up with your primary care doctor in 1 week.      SECONDARY DISCHARGE DIAGNOSES  Diagnosis: Multiple myeloma  Assessment and Plan of Treatment: You have a known diagnosis of Multiple Myeloma. Our hematologist/oncologist saw you and recommended pain management and to hold your lenalidomide until after discharge.  Please follow up with your heme/onc in 1 week.    Diagnosis: Hyponatremia  Assessment and Plan of Treatment: You were found to have low sodium levels in the hospital. We had our nephrologist follow you and recommended decreasing your water intake and gave you salt tabs.  Please follow up with your primary care doctor in 1 week.     PRINCIPAL DISCHARGE DIAGNOSIS  Diagnosis: Lumbar compression fracture  Assessment and Plan of Treatment: Back Pain 2/2 Thoracic & Lumbar Compression Fractures  -MRI T/L spine:  Marked compression deformities of T2 and L1 are nonspecific.   Superior endplate compression deformities T11,T12 and L4 have a benign   type appearance. Mild bony retropulsion at T2 without cord compression.   No abnormal intraspinal enhancement.  You were admitted to the hospital for fractures found in the lower spine. We believe this is due to your multiple myeloma.  - Orthopedics was following and recommended a TLSO brace to help you when you move.   -Continue Percocet & Morphine ER as directed by DR San-Oncologist  -Continue Miralax & Senna daily   Continue using your TLSO brace.  Please follow up with your primary care doctor in 1 week.      SECONDARY DISCHARGE DIAGNOSES  Diagnosis: Multiple myeloma  Assessment and Plan of Treatment: You have a Recent diagnosis of Multiple Myeloma. -On Chemo therapy  -DR San  hematologist/oncologist saw you and recommended pain management and to hold your lenalidomide until after discharge.  Please follow up with your heme/onc in 1 week.  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  - nephrologist follow you and recommended decreasing your water intake and gave you salt tabs.  -Continue Regular Salt in diet , Water restriction 1000 ml/24 hrs   Please follow up with your primary care doctor in 1 week.    Diagnosis: Hypertension  Assessment and Plan of Treatment: Losartan daily    Diagnosis: Anemia  Assessment and Plan of Treatment: 2/2 Likely MM & Chemo tretament   CBC with DR San on next week Monday

## 2023-07-27 NOTE — H&P ADULT - PROBLEM SELECTOR PLAN 2
- Continue home Acyclovir, Lenalidomide  - Follows with Heme/Onc Dr. San  - Heme/Onc (Dr. San) consulted, f/u recs - Recently Dx 6/2023-BM Bx   -On Chemo Rx , last Chemo Tuesday 7/25/23   - Continue home Acyclovir, Lenalidomide  - Follows with Heme/Onc Dr. San  - Heme/Onc (Dr. San) consulted, f/u recs

## 2023-07-27 NOTE — ED ADULT NURSE REASSESSMENT NOTE - NS ED NURSE REASSESS COMMENT FT1
pt returned from ct scan. pt now sleeping in stretcher in NAD, RR even and unlabored. daughter at bedside. daughter aware of urine specimen needed. safety measures maintained, side rails up x2.

## 2023-07-27 NOTE — PATIENT PROFILE ADULT - FALL HARM RISK - RISK INTERVENTIONS

## 2023-07-27 NOTE — ED ADULT NURSE NOTE - NS ED NURSE REPORT GIVEN TO FT
Silver Nitrate Text: The wound bed was treated with silver nitrate after the biopsy was performed. 206

## 2023-07-27 NOTE — PROGRESS NOTE ADULT - PROBLEM SELECTOR PLAN 1
Lumbar compression fracture likely 2/2 MM, pt admits to severe LBP worsening since Sun 7/23, denies inciting fall or trauma   - CT chest: Expansile lucent/lytic lateral L 6th rib lesion compatible with reported history of multiple myeloma  - CT Lumbar spine: Multilevel chronic vertebral body compression deformities at T11, L1, and L4. Superimposed acute or subacute components of the T11 and L1 fractures is not excluded  - Given: IV Dilaudid 0.5mg x2, IV Zofran x1, 1L IV NS bolus x1 in ED  - Pain regimen: IV Dilaudid 0.5mg for moderate pain, IV Morphine 2mg for severe pain   - Ortho consulted, f/u recs Lumbar compression fracture likely 2/2 MM, pt admits to severe LBP worsening since Sun 7/23, denies inciting fall or trauma   - CT chest: Expansile lucent/lytic lateral L 6th rib lesion compatible with reported history of multiple myeloma  - CT Lumbar spine: Multilevel chronic vertebral body compression deformities at T11, L1, and L4. Superimposed acute or subacute components of the T11 and L1 fractures is not excluded  - Given: IV Dilaudid 0.5mg x2, IV Zofran x1, 1L IV NS bolus x1 in ED  - Pain regimen: IV Dilaudid 0.5mg for moderate pain, IV Morphine 2mg for severe pain   - CTA Chest - no acute abdominal pathology   - XR lumbar spine - Acute fracture of the superior endplate of T12 and chronic compression deformities, better characterized on concurrent CT  - f/u MRI cervical, thoracic, lumbar spine  - f/u skeletal survey  - will reach out to ortho for TLSO brace  - Ortho consulted, f/u recs  - PT/OT consulted -Lower Back pain - Acute on chronic  Lumbar compression fracture likely Pathological; 2/2 MM & Osteoporosis   -, pt admits to severe LBP worsening since Sun 7/23, denies inciting fall or trauma   - CT chest: Expansile lucent/lytic lateral L 6th rib lesion compatible with reported history of multiple myeloma  - CT Lumbar spine: Multilevel chronic vertebral body compression deformities at T11, L1, and L4. Superimposed acute or subacute components of the T11 and L1 fractures is not excluded  - Given: IV Dilaudid 0.5mg x2, IV Zofran x1, 1L IV NS bolus x1 in ED  - Pain regimen: IV Dilaudid 0.5mg for moderate pain, IV Morphine 2mg for severe pain   - CTA Chest - no acute abdominal pathology   - XR lumbar spine - Acute fracture of the superior endplate of T12 and chronic compression deformities, better characterized on concurrent CT  - f/u MRI cervical, thoracic, lumbar spine  - f/u skeletal survey  - will reach out to ortho for TLSO brace  - Ortho consulted, f/u recs  - PT/OT consulted-TLSO Brace

## 2023-07-27 NOTE — CONSULT NOTE ADULT - ASSESSMENT
[ASSESSMENT and  PLAN]  M84. 48XA.  Pathologic fracture left lateral  sixth rib. Multiple vertebral body compression fractures.  D63.8 Anemia due to chronic disease  E87.1  Hyponatremia  I10  Hypertension  S22.000A  Back pain, LS spine compression fracture  C90.0   MM  D50.0 Iron deficiency anemia    Patient is a 87y old  Female who presents with a chief complaint of back pain   Closed compression fracture of lumbosacral spine.   Pathologic fracture left lateral  sixth rib. Multiple vertebral body compression fractures.    Improving sodium levels. To continue current meds. Monitor BP trend. Titrate BP meds as needed.  Stable renal indices. Will follow electrolytes and renal function trend.     Work-up with marked elevated STEPHANE kappa of 188.2.  IgG decreased 336, IgA increased at 1140, IgM 49.  IgA-lambda monoclonal protein and likely multiple myeloma    Ferritin 72 inappropriate low given elevated ESR and CRP.      RECOMMENDATIONS  Transfuse PRBC as clinically indicated.   Transfuse PRBC if Hgb <7.0 or if symptomatic.   Follow CBC    Followup Anemia studies.   Followup MM studies  Elevated ESR 97 and CRP 5    Consideration for Rib bx    awaiting lab return  MRI with benign findings except rib  if discharged will arrange for biopsy as outpatient  Follow in office w me on Mon 830AM for eval.     Discussed with Dr Norris. [ASSESSMENT and  PLAN]  M84. 48XA.  Pathologic fracture left lateral  sixth rib. Multiple vertebral body compression fractures.  D63.8 Anemia due to chronic disease  E87.1  Hyponatremia  I10  Hypertension  S22.000A  Back pain, LS spine compression fracture  C90.0   MM  D50.0 Iron deficiency anemia    Patient is a 87y old  Female who presents with a chief complaint of back pain     Pt had admission in May with similar pain. At time dx with compression fractures, osteoporosis and Multiple myeloma, hyponatremia.   Pt developed hypercalcemia due to malignancy shortly after DC, treated with Zometa IV.     At time imaging showed   Imaging showed Closed compression fracture of lumbosacral spine.   Pathologic fracture left lateral  sixth rib.   Multiple vertebral body compression fractures. T2, T11, and L1, L4,.   Severe compression fx T2, Mild T11 superior endplate compression deformity with faint fracture line  Severe L1 compression fracture, moderate-severe L4 compression fracture    Myeloma Workup  Work-up with marked elevated STEPHANE kappa of 188.2.  IgG decreased 336, IgA increased at 1140, IgM 49.  IgA-lambda monoclonal protein and likely multiple myeloma    Pt started tx wih Darzalex, Revlimid, Dex. Last dose given Tues.     Fe def  Prior Ferritin 72 inappropriate low given elevated ESR and CRP.      Acute T12 compression fx as cause of pain  sx started Sun. Abated Tues. Recommended outpt resume TLSO brace use more frequently and avoid any wt loadng  Outpt imaging planned; family advised to come to ED if sx worse as concern for new fx.   May need pain control.  Came to ED Wed AM    Osteoporosis      RECOMMENDATIONS    HOLD revlimid    Pt to bring in TLSO brace.   Appreciate ortho eval    Pain mgmt    Resume / Calcium D BID    Transfuse PRBC as clinically indicated.   Transfuse PRBC if Hgb <7.0 or if symptomatic.   Follow CBC  DVT prophylaxis  DC planning when stable.

## 2023-07-27 NOTE — ED PROVIDER NOTE - PHYSICAL EXAMINATION
no midline cervical TTP  Diffuse TTP to lower thoracic and entire lumbar region   no skin changes noted to the back

## 2023-07-27 NOTE — H&P ADULT - PROBLEM SELECTOR PLAN 3
Likely anemia of chronic disease 2/2 MM  - Hgb 9.8 on admission, baseline Hgb ~9-10 per chart review  - No signs/symptoms of bleeding  - Transfuse for Hgb <7 Likely anemia of chronic disease 2/2 MM & Chemo Therapy   - Hgb 9.8 on admission, baseline Hgb ~9-10 per chart review  - No signs/symptoms of bleeding  - Transfuse for Hgb <7  CBC in AM

## 2023-07-27 NOTE — CONSULT NOTE ADULT - ASSESSMENT
Hyponatremia; SIADH  Anemia, Multiple Myeloma  Hypertension  Back pain, LS spine compression fracture    Check Lynette, Uosm. PO fluid restriction. Sodium levels improving. Avoid excessive PO fluids. Monitor BP trend. Titrate BP meds as needed.  Stable renal indices. Hematology evaluation. D/w patients family at bedside. Will follow electrolytes and renal function trend.     Further recommendations pending clinical course. Thank you for the courtesy of this referral.

## 2023-07-27 NOTE — CONSULT NOTE ADULT - SUBJECTIVE AND OBJECTIVE BOX
87F PMH MM (diagnosed by recent bone marrow biopsy/started Chemo June ’23), HTN presents to [place] ED with lower back pain. Has history of chronic LBP but pain worsened on Sunday. At baseline ambulates with walker. Yesterday, could not stand or walk due to back pain and was brought into ED. The pain is localized to her lower back and does not radiate. She does not have any pain elsewhere including long bones or pelvis. Denies falls, trauma, numbness, tingling, CP, SOB, N/V/D, urinary/bowel incontinence. To note, she was recently seen in the ED (May ’23) for similar pain by Dr. Menezes where MRI revealed T11/L1/L4 VCF at the time.    Vital Signs (24 Hrs):  T(C): 36.8 (07-27-23 @ 08:03), Max: 36.8 (07-27-23 @ 08:03)  HR: 67 (07-27-23 @ 08:03) (67 - 78)  BP: 138/79 (07-27-23 @ 08:03) (130/71 - 141/74)  RR: 16 (07-27-23 @ 08:03) (16 - 22)  SpO2: 97% (07-27-23 @ 08:03) (96% - 98%)  Wt(kg): --    LABS:                          9.0    4.82  )-----------( 211      ( 27 Jul 2023 06:00 )             27.1     07-27    129<L>  |  98  |  16  ----------------------------<  100<H>  4.7   |  26  |  0.69    Ca    8.0<L>      27 Jul 2023 06:00    TPro  5.7<L>  /  Alb  2.9<L>  /  TBili  0.3  /  DBili  x   /  AST  15  /  ALT  30  /  AlkPhos  95  07-27    LIVER FUNCTIONS - ( 27 Jul 2023 06:00 )  Alb: 2.9 g/dL / Pro: 5.7 g/dL / ALK PHOS: 95 U/L / ALT: 30 U/L / AST: 15 U/L / GGT: x           Physical Exam:  General: NAD, patient laying in bed in slight discomfort  Compartments soft, compressible  Calves nontender    Spine:  TTP paraspinal lumbar region, no C/T/Lspine TTP or step offs  No pain with passive/active SLR    Motor:     C5   C6    C7   C8   T1  L  5/5  5/5  5/5  5/5  5/5  R  5/5  5/5  5/5  5/5  5/5       L2   L3    L4   L5   S1  L  5/5  5/5  5/5  5/5  5/5  R  5/5  5/5  5/5  5/5  5/5    Sensory:     C5   C6    C7   C8   T1  L    2     2      2     2     2  R    2     2      2     2     2       L2   L3    L4   L5   S1  L    2     2      2     2     2  R    2     2      2     2     2    Neg grace, neg babinski, neg clonus    Secondary Assessment:  NC/AT, NTTP of clavicles, NTTP of Pelvis  UEs: NTTP of Shoulders, Elbows, Wrists, Hands; NT with AROM/PROM of Shoulders, Elbows, Wrists, Hands; AIN/PIN/Med/Uln/Msc/Rad/Ax intact  LEs: Able to SLR, NT with Log Roll, NT with Heel Strike, NTTP of Hips, Knees, Ankles, Feet; NT with AROM/PROM of Hips, Knees, Ankles, Feet; Q/H/Gsc/TA/EHL/FHL intact    Imaging:  CT Lsp: Lytic Lucency L3; Acute T12 VCF, Chronic T11, L1, L4 VCF    CTA Chest: Expansile Lucent/Lytic Lateral L 6th Rib Lesion, Nondisplaced L 7th Rib Fx    CT Abd/Pelv: No obvious lesions or fx appreciated throughout pelvic/hip areas.     A/P:  Pt is a 87F who presents with lower back pain, new T12 compression fx found on CT  Cannot definitively rule out metastatic cancer at this time due to MM and previous multiple fx.     Will order MRI C/T/L-spine w/wo contrast, skeletal survey, additional labs (ESR, CRP, UPEP, SPEP, LDH) to evaluate for possible metastases or epidural tumor.  WBAT  Can consider TLSO brace for comfort while ambulating  PT/OT  Analgesics as needed  DVT ppx per primary team, currently on Lovenox  Appreciate medical recommendations  Discussed plan with Dr. Menezes who agrees with above 87F PMH MM (diagnosed by recent bone marrow biopsy/started Chemo June ’23), HTN presents to [place] ED with lower back pain. Has history of chronic LBP but pain worsened on Sunday. At baseline ambulates with walker. Yesterday, could not stand or walk due to back pain and was brought into ED. The pain is localized to her lower back and does not radiate. She does not have any pain elsewhere including long bones or pelvis. Denies falls, trauma, numbness, tingling, CP, SOB, N/V/D, urinary/bowel incontinence. To note, she was recently seen in the ED (May ’23) for similar pain  where MRI revealed T11/L1/L4 VCF at the time. Since then, she has been undergoing chemotherapy for multiple myeloma, which was confirmed by bone marrow biopsy shortly after the initial encounter. No other orthopedic complaints at this time.    Vital Signs (24 Hrs):  T(C): 36.8 (07-27-23 @ 08:03), Max: 36.8 (07-27-23 @ 08:03)  HR: 67 (07-27-23 @ 08:03) (67 - 78)  BP: 138/79 (07-27-23 @ 08:03) (130/71 - 141/74)  RR: 16 (07-27-23 @ 08:03) (16 - 22)  SpO2: 97% (07-27-23 @ 08:03) (96% - 98%)  Wt(kg): --    LABS:                          9.0    4.82  )-----------( 211      ( 27 Jul 2023 06:00 )             27.1     07-27    129<L>  |  98  |  16  ----------------------------<  100<H>  4.7   |  26  |  0.69    Ca    8.0<L>      27 Jul 2023 06:00    TPro  5.7<L>  /  Alb  2.9<L>  /  TBili  0.3  /  DBili  x   /  AST  15  /  ALT  30  /  AlkPhos  95  07-27    LIVER FUNCTIONS - ( 27 Jul 2023 06:00 )  Alb: 2.9 g/dL / Pro: 5.7 g/dL / ALK PHOS: 95 U/L / ALT: 30 U/L / AST: 15 U/L / GGT: x           Physical Exam:  General: NAD, patient laying in bed in slight discomfort  Compartments soft, compressible  Calves nontender    Spine:  TTP paraspinal lumbar region, no C/T/Lspine TTP or step offs  No pain with passive/active SLR    Motor:     C5   C6    C7   C8   T1  L  5/5  5/5  5/5  5/5  5/5  R  5/5  5/5  5/5  5/5  5/5       L2   L3    L4   L5   S1  L  5/5  5/5  5/5  5/5  5/5  R  5/5  5/5  5/5  5/5  5/5    Sensory:     C5   C6    C7   C8   T1  L    2     2      2     2     2  R    2     2      2     2     2       L2   L3    L4   L5   S1  L    2     2      2     2     2  R    2     2      2     2     2    Neg grace, neg babinski, neg clonus    Secondary Assessment:  NC/AT, NTTP of clavicles, NTTP of Pelvis  UEs: NTTP of Shoulders, Elbows, Wrists, Hands; NT with AROM/PROM of Shoulders, Elbows, Wrists, Hands; AIN/PIN/Med/Uln/Msc/Rad/Ax intact  LEs: Able to SLR, NT with Log Roll, NT with Heel Strike, NTTP of Hips, Knees, Ankles, Feet; NT with AROM/PROM of Hips, Knees, Ankles, Feet; Q/H/Gsc/TA/EHL/FHL intact    Imaging:  CT Lsp: Lytic Lucency L3; Acute T12 VCF, Chronic T11, L1, L4 VCF    CTA Chest: Expansile Lucent/Lytic Lateral L 6th Rib Lesion, Nondisplaced L 7th Rib Fx    CT Abd/Pelv: No obvious lesions or fx appreciated throughout pelvic/hip areas.     A/P:  Pt is a 87F who presents with lower back pain, new T12 compression fx found on CT  Cannot definitively rule out metastatic cancer at this time due to MM and previous multiple fx.     Will order MRI C/T/L-spine w/wo contrast, skeletal survey, additional labs (ESR, CRP, UPEP, SPEP, LDH) to evaluate for possible metastases or epidural tumor.  Final plan pending MRI  WBAT  Can consider TLSO brace for comfort while ambulating  PT/OT  Analgesics as needed  DVT ppx per primary team, currently on Lovenox  Appreciate medical recommendations  Discussed plan with Dr. Gray agrees with above

## 2023-07-27 NOTE — H&P ADULT - ATTENDING COMMENTS
86yo F with PMHx of MM (on chemo), Hypertension presents to ED c/o low back pain, admitted for lumbar compression fracture likely 2/2 MM.  Pt seen, Examined, case & care plan d/w pt, residents at detail.  AM labs   Consults:  H/O DR San  Orthopedics   PT Eval  PO diet  DVT PPX   AM labs

## 2023-07-27 NOTE — DISCHARGE NOTE PROVIDER - CARE PROVIDERS DIRECT ADDRESSES
,DirectAddress_Unknown,[email or web address] ,DirectAddress_Unknown,[email or web address],DirectAddress_Unknown

## 2023-07-27 NOTE — H&P ADULT - HISTORY OF PRESENT ILLNESS
86yo F with PMHx of MM (on chemo), Hypertension presents to ED c/o low back pain. History is provided by daughter (Katherine) at bedside. Pt started chemo for MM at the end of June with Heme/Onc Dr. San. Pt has chronic low back pain, however on Sunday, pt's low back pain became progressively worse. Today, pt could not stand or walk due to pain so she was brought to ED for management. Pt did not suffer from any inciting falls or trauma.     ED course:  Vitals: HR 75, /71, RR 22, SpO2 98% on RA  Labs: RBC 3.54, Hgb 9.8, Hct 30.1, Na 127, Ca 8.4, Alk phos 121  EKG: pending  CT chest: Expansile lucent/lytic lateral L 6th rib lesion compatible with reported history of multiple myeloma  CT Lumbar spine: Multilevel chronic vertebral body compression deformities at T11, L1, and L4. Superimposed acute or subacute components of the T11 and L1 fractures is not excluded  Given: IV Dilaudid 0.5mg x2, IV Zofran x1, 1L IV NS bolus x1

## 2023-07-27 NOTE — CONSULT NOTE ADULT - SUBJECTIVE AND OBJECTIVE BOX
Patient is a 87y old  Female who presents with a chief complaint of Lumbar compression fracture (2023 11:29)    HPI:  88yo F with PMHx of MM (on chemo), Hypertension presents to ED c/o low back pain. History is provided by daughter (Katherine) at bedside. Pt started chemo for MM at the end of  with Heme/Onc Dr. San. Pt has chronic low back pain, however on , pt's low back pain became progressively worse. Today, pt could not stand or walk due to pain so she was brought to ED for management. Pt did not suffer from any inciting falls or trauma.     ED course:  Vitals: HR 75, /71, RR 22, SpO2 98% on RA  Labs: RBC 3.54, Hgb 9.8, Hct 30.1, Na 127, Ca 8.4, Alk phos 121  EKG: pending  CT chest: Expansile lucent/lytic lateral L 6th rib lesion compatible with reported history of multiple myeloma  CT Lumbar spine: Multilevel chronic vertebral body compression deformities at T11, L1, and L4. Superimposed acute or subacute components of the T11 and L1 fractures is not excluded  Given: IV Dilaudid 0.5mg x2, IV Zofran x1, 1L IV NS bolus x1 (2023 05:24)      PAST MEDICAL HISTORY:  Hypertension    GERD (gastroesophageal reflux disease)    History of ankle surgery    Ankle fracture, left    Gastro-esophageal reflux disease without esophagitis    Benign essential hypertension    Chronic cough    Chronic low back pain    Dyspnea on exertion    Back pain with sciatica    Regurgitation of food    Rib fracture    Fall at home        PAST SURGICAL HISTORY:  Ankle fracture, left    H/O endoscopy    H/O colonoscopy    History of tonsillectomy    History of repair of hiatal hernia        FAMILY HISTORY:  No pertinent family history in first degree relatives        SOCIAL HISTORY:    Allergies    No Known Allergies    Intolerances      Home Medications:  acyclovir 400 mg oral tablet: 1 orally 2 times a day (2023 05:38)  dexAMETHasone 4 mg oral tablet: 5 tab(s) orally once a day Every Monday with chemo (2023 05:38)  Lenalidomide 25 mg oral capsule: 1 orally once a day (2023 05:38)  losartan 25 mg oral tablet: 0.5 tab(s) orally once a day (2023 05:38)  morphine 30 mg/24 hours oral capsule, extended release: 1 orally 2 times a day as needed for  severe pain (2023 05:38)  Percocet 5 mg-325 mg oral tablet: 1 orally 2 times a day as needed for  severe pain (2023 05:38)    MEDICATIONS  (STANDING):  acyclovir   Oral Tab/Cap 400 milliGRAM(s) Oral two times a day  enoxaparin Injectable 40 milliGRAM(s) SubCutaneous every 24 hours  losartan 12.5 milliGRAM(s) Oral daily  polyethylene glycol 3350 17 Gram(s) Oral daily  senna 2 Tablet(s) Oral at bedtime    MEDICATIONS  (PRN):  HYDROmorphone  Injectable 0.5 milliGRAM(s) IV Push every 6 hours PRN Moderate Pain (4 - 6)  melatonin 3 milliGRAM(s) Oral at bedtime PRN Insomnia  morphine  - Injectable 2 milliGRAM(s) IV Push every 6 hours PRN Severe Pain (7 - 10)  ondansetron Injectable 4 milliGRAM(s) IV Push every 8 hours PRN Nausea and/or Vomiting      REVIEW OF SYSTEMS:  General: weak  Respiratory: No cough, SOB  Cardiovascular: No CP or Palpitations	  Gastrointestinal: No nausea, Vomiting. No diarrhea  Genitourinary: No urinary complaints	  Musculoskeletal: back pain, No new rash or lesions		  all other systems negative    T(F): 97.7 (23 @ 12:08), Max: 98.2 (23 @ 08:03)  HR: 68 (23 @ 12:08) (67 - 78)  BP: 126/53 (23 @ 12:08) (126/53 - 141/74)  RR: 20 (23 @ 12:08) (16 - 22)  SpO2: 94% (23 @ 12:08) (94% - 98%)  Wt(kg): --    PHYSICAL EXAM:  General: NAD  Respiratory: b/l air entry  Cardiovascular: S1 S2  Gastrointestinal: soft  Extremities: no edema            129<L>  |  98  |  16  ----------------------------<  100<H>  4.7   |  26  |  0.69    Ca    8.0<L>      2023 06:00    T Pro  5.7<L>  /  Alb  2.9<L>  /  T Bili  0.3  /  D Bili  x   /  AST  15  /  ALT  30  /  Alk Phos  95                            9.0    4.82  )-----------( 211      ( 2023 06:00 )             27.1       Potassium: 4.7 mmol/L ( @ 06:00)  Blood Urea Nitrogen: 16 mg/dL ( @ 06:00)  Calcium: 8.0 mg/dL ( @ 06:00)  Hemoglobin: 9.0 g/dL ( @ 06:00)      Creatinine, Serum: 0.69 ( @ 06:00)  Creatinine, Serum: 0.89 ( @ 22:10)      Urinalysis Basic - ( 2023 07:03 )    Color: Yellow / Appearance: Clear / S.038 / pH: x  Gluc: x / Ketone: Negative mg/dL  / Bili: Negative / Urobili: 0.2 mg/dL   Blood: x / Protein: Trace mg/dL / Nitrite: Negative   Leuk Esterase: Negative / RBC: x / WBC x   Sq Epi: x / Non Sq Epi: x / Bacteria: x      LIVER FUNCTIONS - ( 2023 06:00 )  Alb: 2.9 g/dL / Pro: 5.7 g/dL / ALK PHOS: 95 U/L / ALT: 30 U/L / AST: 15 U/L / GGT: x

## 2023-07-27 NOTE — H&P ADULT - NSICDXPASTMEDICALHX_GEN_ALL_CORE_FT
PAST MEDICAL HISTORY:  Ankle fracture, left     Back pain with sciatica Sciatica affects LEFT Side    Benign essential hypertension     Chronic cough     Chronic low back pain     Dyspnea on exertion     Fall at home March 2023    Gastro-esophageal reflux disease without esophagitis     GERD (gastroesophageal reflux disease)     Hypertension     Regurgitation of food     Rib fracture right May 2023     PAST MEDICAL HISTORY:  Anemia of chronic disease     Ankle fracture, left     Back pain with sciatica Sciatica affects LEFT Side    Benign essential hypertension     Chronic cough     Chronic low back pain     Dyspnea on exertion     Fall at home March 2023    Gastro-esophageal reflux disease without esophagitis     GERD (gastroesophageal reflux disease)     Hypertension     Hyponatremia     Lumbar compression fracture     Multiple myeloma     Regurgitation of food     Rib fracture right May 2023

## 2023-07-27 NOTE — DISCHARGE NOTE PROVIDER - PROVIDER TOKENS
PROVIDER:[TOKEN:[[id number]]],PROVIDER:[TOKEN:[[id number]],ESTABLISHEDPATIENT:[T]] PROVIDER:[TOKEN:[[id number]]],PROVIDER:[TOKEN:[[id number]],ESTABLISHEDPATIENT:[T]],PROVIDER:[TOKEN:[[id number]]]

## 2023-07-27 NOTE — ED ADULT NURSE REASSESSMENT NOTE - NS ED NURSE REASSESS COMMENT FT1
pt to ct scan via stretcher in NAD, RR even and unlabored. safety measures maintained, side rails up x2.

## 2023-07-27 NOTE — H&P ADULT - NEUROLOGICAL
somnolent negative somnolent/normal/cranial nerves II-XII intact/sensation intact/cranial nerves intact

## 2023-07-27 NOTE — PROGRESS NOTE ADULT - SUBJECTIVE AND OBJECTIVE BOX
Patient is a 87y old  Female who presents with a chief complaint of Lumbar compression fracture (2023 10:29)    HPI:  86yo F with PMHx of MM (on chemo), Hypertension presents to ED c/o low back pain. History is provided by daughter (Katherine) at bedside. Pt started chemo for MM at the end of  with Heme/Onc Dr. San. Pt has chronic low back pain, however on , pt's low back pain became progressively worse. Today, pt could not stand or walk due to pain so she was brought to ED for management. Pt did not suffer from any inciting falls or trauma.     ED course:  Vitals: HR 75, /71, RR 22, SpO2 98% on RA  Labs: RBC 3.54, Hgb 9.8, Hct 30.1, Na 127, Ca 8.4, Alk phos 121  EKG: pending  CT chest: Expansile lucent/lytic lateral L 6th rib lesion compatible with reported history of multiple myeloma  CT Lumbar spine: Multilevel chronic vertebral body compression deformities at T11, L1, and L4. Superimposed acute or subacute components of the T11 and L1 fractures is not excluded  Given: IV Dilaudid 0.5mg x2, IV Zofran x1, 1L IV NS bolus x1 (2023 05:24)    INTERVAL HPI:  23 - Patient was seen and examined at bedside.     OVERNIGHT EVENTS: None    Home Medications:  acyclovir 400 mg oral tablet: 1 orally 2 times a day (2023 05:38)  dexAMETHasone 4 mg oral tablet: 5 tab(s) orally once a day Every Monday with chemo (2023 05:38)  Lenalidomide 25 mg oral capsule: 1 orally once a day (2023 05:38)  losartan 25 mg oral tablet: 0.5 tab(s) orally once a day (2023 05:38)  morphine 30 mg/24 hours oral capsule, extended release: 1 orally 2 times a day as needed for  severe pain (2023 05:38)  Percocet 5 mg-325 mg oral tablet: 1 orally 2 times a day as needed for  severe pain (2023 05:38)      MEDICATIONS  (STANDING):  acyclovir   Oral Tab/Cap 400 milliGRAM(s) Oral two times a day  enoxaparin Injectable 40 milliGRAM(s) SubCutaneous every 24 hours  losartan 12.5 milliGRAM(s) Oral daily  polyethylene glycol 3350 17 Gram(s) Oral daily  senna 2 Tablet(s) Oral at bedtime    MEDICATIONS  (PRN):  HYDROmorphone  Injectable 0.5 milliGRAM(s) IV Push every 6 hours PRN Moderate Pain (4 - 6)  melatonin 3 milliGRAM(s) Oral at bedtime PRN Insomnia  morphine  - Injectable 2 milliGRAM(s) IV Push every 6 hours PRN Severe Pain (7 - 10)  ondansetron Injectable 4 milliGRAM(s) IV Push every 8 hours PRN Nausea and/or Vomiting      Allergies    No Known Allergies    Intolerances        Social History:  Tobacco: denies  EtOH: denies   Recreational drug use: denies  Lives with: daughter  Ambulates: with a walker  ADLs: with assistance (2023 05:24)      REVIEW OF SYSTEMS:  CONSTITUTIONAL: No fever, No chills, No fatigue, No myalgia, No Body ache, No Weakness  EYES: No eye pain,  No visual disturbances, No discharge, NO Redness  ENMT:  No ear pain, No nose bleed, No vertigo; No sinus pain, NO throat pain, No Congestion  NECK: No pain, No stiffness  RESPIRATORY: No cough, NO wheezing, No  hemoptysis, NO  shortness of breath  CARDIOVASCULAR: No chest pain, palpitations  GASTROINTESTINAL: No abdominal pain, NO epigastric pain. No nausea, No vomiting; No diarrhea, No constipation. [  ] BM  GENITOURINARY: No dysuria, No frequency, No urgency, No hematuria, NO incontinence  NEUROLOGICAL: No headaches, No dizziness, No numbness, No tingling, No tremors, No weakness  EXT: No Swelling, No Pain, No Edema  SKIN:  [  ] No itching, burning, rashes, or lesions   MUSCULOSKELETAL: No joint pain ,No Jt swelling; No muscle pain, No back pain, No extremity pain  PSYCHIATRIC: No depression,  No anxiety,  No mood swings ,No difficulty sleeping at night  PAIN SCALE: [  ] None  [  ] Other-  ROS Unable to obtain due to - [  ] Dementia  [  ] Lethargy [  ] Drowsy [  ] Sedated [  ] non verbal  REST OF REVIEW Of SYSTEM - [  ] Normal     Vital Signs Last 24 Hrs  T(C): 36.8 (2023 08:03), Max: 36.8 (2023 08:03)  T(F): 98.2 (2023 08:03), Max: 98.2 (2023 08:03)  HR: 67 (2023 08:03) (67 - 78)  BP: 138/79 (2023 08:03) (130/71 - 141/74)  BP(mean): --  RR: 16 (2023 08:03) (16 - 22)  SpO2: 97% (2023 08:03) (96% - 98%)    Parameters below as of 2023 08:03  Patient On (Oxygen Delivery Method): room air      Finger Stick          PHYSICAL EXAM:  GENERAL:  [  ] NAD , [  ] well appearing, [  ] Agitated, [  ] Drowsy,  [  ] Lethargy, [  ] confused   HEAD:  [  ] Normal, [  ] Other  EYES:  [  ] EOMI, [  ] PERRLA, [  ] conjunctiva and sclera clear normal, [  ] Other,  [  ] Pallor,[  ] Discharge  ENMT:  [  ] Normal, [  ] Moist mucous membranes, [  ] Good dentition, [  ] No Thrush  NECK:  [  ] Supple, [  ] No JVD, [  ] Normal thyroid, [  ] Lymphadenopathy [  ] Other  CHEST/LUNG:  [  ] Clear to auscultation bilaterally, [  ] Breath Sounds equal B/L / Decrease, [  ] poor effort  [  ] No rales, [  ] No rhonchi  [  ]  No wheezing,   HEART:  [  ] Regular rate and rhythm, [  ] tachycardia, [  ] Bradycardia,  [  ] irregular  [  ] No murmurs, No rubs, No gallops, [  ] PPM in place (Mfr:  )  ABDOMEN:  [  ] Soft, [  ] Nontender, [  ] Nondistended, [  ] No mass, [  ] Bowel sounds present, [  ] obese  NERVOUS SYSTEM:  [  ] Alert & Oriented X3, [  ] Nonfocal  [  ] Confusion  [  ] Encephalopathic [  ] Sedated [  ] Unable to assess, [  ] Dementia [  ] Other-  EXTREMITIES: [  ] 2+ Peripheral Pulses, No clubbing, No cyanosis,  [  ] edema B/L lower EXT. [  ] PVD stasis skin changes B/L Lower EXT, [  ] wound  LYMPH: No lymphadenopathy noted  SKIN:  [  ] No rashes or lesions, [  ] Pressure Ulcers, [  ] ecchymosis, [  ] Skin Tears, [  ] Other    DIET: Diet, Regular (23 @ 11:15)      LABS:                        9.0    4.82  )-----------( 211      ( 2023 06:00 )             27.1     2023 06:00    129    |  98     |  16     ----------------------------<  100    4.7     |  26     |  0.69     Ca    8.0        2023 06:00    TPro  5.7    /  Alb  2.9    /  TBili  0.3    /  DBili  x      /  AST  15     /  ALT  30     /  AlkPhos  95     2023 06:00      Urinalysis Basic - ( 2023 07:03 )    Color: Yellow / Appearance: Clear / S.038 / pH: x  Gluc: x / Ketone: Negative mg/dL  / Bili: Negative / Urobili: 0.2 mg/dL   Blood: x / Protein: Trace mg/dL / Nitrite: Negative   Leuk Esterase: Negative / RBC: x / WBC x   Sq Epi: x / Non Sq Epi: x / Bacteria: x                           Anemia Panel:      Thyroid Panel:        Lipase: 114 U/L (23 @ 22:10)          RADIOLOGY & ADDITIONAL TESTS:      HEALTH ISSUES - PROBLEM Dx:  Multiple myeloma    Need for prophylactic measure    Hypertension    Anemia    Hyponatremia    Lumbar compression fracture            Consultant(s) Notes Reviewed:  [  ] YES     Care Discussed with [X] Consultants  [  ] Patient  [  ] Family [  ] HCP [  ]   [  ] Social Service  [  ] RN, [  ] Physical Therapy,[  ] Palliative care team  DVT PPX: [  ] Lovenox, [  ] S C Heparin, [  ] Coumadin, [  ] Xarelto, [  ] Eliquis, [  ] Pradaxa, [  ] IV Heparin drip, [  ] SCD [  ] Contraindication 2 to GI Bleed,[  ] Ambulation [  ] Contraindicated 2 to  bleed [  ] Contraindicated 2 to Brain Bleed  Advanced directive: [  ] None, [  ] DNR/DNI Patient is a 87y old  Female who presents with a chief complaint of Lumbar compression fracture (2023 10:29)    HPI:  88yo F with PMHx of MM (on chemo), Hypertension presents to ED c/o low back pain. History is provided by daughter (Katherine) at bedside. Pt started chemo for MM at the end of  with Heme/Onc Dr. San. Pt has chronic low back pain, however on , pt's low back pain became progressively worse. Today, pt could not stand or walk due to pain so she was brought to ED for management. Pt did not suffer from any inciting falls or trauma.     ED course:  Vitals: HR 75, /71, RR 22, SpO2 98% on RA  Labs: RBC 3.54, Hgb 9.8, Hct 30.1, Na 127, Ca 8.4, Alk phos 121  EKG: pending  CT chest: Expansile lucent/lytic lateral L 6th rib lesion compatible with reported history of multiple myeloma  CT Lumbar spine: Multilevel chronic vertebral body compression deformities at T11, L1, and L4. Superimposed acute or subacute components of the T11 and L1 fractures is not excluded  Given: IV Dilaudid 0.5mg x2, IV Zofran x1, 1L IV NS bolus x1 (2023 05:24)    INTERVAL HPI:  23 - Patient was seen and examined at bedside. No overnight events. Pt is German speaking and daughter assisted in translating. Pt reports 8/10 low pack pain that is non-radiating. She has not received her stronger pain meds for it, so instructed to ask as needed. Pt reports no trauma to her low back. Reports history of fractures from her multiple myeloma. Otherwise Pt has no other complaints. Denies HA, n/v/d, chest pain, abdominal pain, SOB. Last bowel movement yesterday.     OVERNIGHT EVENTS: None    Home Medications:  acyclovir 400 mg oral tablet: 1 orally 2 times a day (2023 05:38)  dexAMETHasone 4 mg oral tablet: 5 tab(s) orally once a day Every Monday with chemo (2023 05:38)  Lenalidomide 25 mg oral capsule: 1 orally once a day (2023 05:38)  losartan 25 mg oral tablet: 0.5 tab(s) orally once a day (2023 05:38)  morphine 30 mg/24 hours oral capsule, extended release: 1 orally 2 times a day as needed for  severe pain (2023 05:38)  Percocet 5 mg-325 mg oral tablet: 1 orally 2 times a day as needed for  severe pain (2023 05:38)      MEDICATIONS  (STANDING):  acyclovir   Oral Tab/Cap 400 milliGRAM(s) Oral two times a day  enoxaparin Injectable 40 milliGRAM(s) SubCutaneous every 24 hours  losartan 12.5 milliGRAM(s) Oral daily  polyethylene glycol 3350 17 Gram(s) Oral daily  senna 2 Tablet(s) Oral at bedtime    MEDICATIONS  (PRN):  HYDROmorphone  Injectable 0.5 milliGRAM(s) IV Push every 6 hours PRN Moderate Pain (4 - 6)  melatonin 3 milliGRAM(s) Oral at bedtime PRN Insomnia  morphine  - Injectable 2 milliGRAM(s) IV Push every 6 hours PRN Severe Pain (7 - 10)  ondansetron Injectable 4 milliGRAM(s) IV Push every 8 hours PRN Nausea and/or Vomiting      Allergies    No Known Allergies    Intolerances        Social History:  Tobacco: denies  EtOH: denies   Recreational drug use: denies  Lives with: daughter  Ambulates: with a walker  ADLs: with assistance (2023 05:24)      REVIEW OF SYSTEMS:  CONSTITUTIONAL: No fever, No chills, No fatigue, No myalgia, No Body ache, No Weakness  EYES: No eye pain,  No visual disturbances, No discharge, NO Redness  ENMT:  No ear pain, No nose bleed, No vertigo; No sinus pain, NO throat pain, No Congestion  NECK: No pain, No stiffness  RESPIRATORY: No cough, NO wheezing, No  hemoptysis, NO  shortness of breath  CARDIOVASCULAR: No chest pain, palpitations  GASTROINTESTINAL: No abdominal pain, NO epigastric pain. No nausea, No vomiting; No diarrhea, No constipation. [ x ] BM - yesterday  GENITOURINARY: No dysuria, No frequency, No urgency, No hematuria, NO incontinence  NEUROLOGICAL: No headaches, No dizziness, No numbness, No tingling, No tremors, No weakness  EXT: No Swelling, No Pain, No Edema  SKIN:  [ x ] No itching, burning, rashes, or lesions   MUSCULOSKELETAL: No joint pain ,No Jt swelling; No muscle pain, No back pain, No extremity pain  PSYCHIATRIC: No depression,  No anxiety,  No mood swings ,No difficulty sleeping at night  PAIN SCALE: [  ] None  [ x ] Other- low back pain, nonradiating.  REST OF REVIEW Of SYSTEM - [ x ] Normal     Vital Signs Last 24 Hrs  T(C): 36.8 (2023 08:03), Max: 36.8 (2023 08:03)  T(F): 98.2 (2023 08:03), Max: 98.2 (2023 08:03)  HR: 67 (2023 08:03) (67 - 78)  BP: 138/79 (2023 08:03) (130/71 - 141/74)  BP(mean): --  RR: 16 (2023 08:03) (16 - 22)  SpO2: 97% (2023 08:03) (96% - 98%)    Parameters below as of 2023 08:03  Patient On (Oxygen Delivery Method): room air      Finger Stick          PHYSICAL EXAM:  GENERAL:  [  ] NAD , [ x ] appears uncomfortable in pain, [  ] Agitated, [  ] Drowsy,  [  ] Lethargy, [  ] confused   HEAD:  [ x ] Normal, [  ] Other  EYES:  [ x ] EOMI, [ x ] PERRLA, [ x ] conjunctiva and sclera clear normal, [  ] Other,  [  ] Pallor,[  ] Discharge  ENMT:  [ x ] Normal, [  ] Moist mucous membranes, [  ] Good dentition, [  ] No Thrush  NECK:  [ x ] Supple, [  ] No JVD, [  ] Normal thyroid, [  ] Lymphadenopathy [  ] Other  CHEST/LUNG:  [ x ] Clear to auscultation bilaterally, [ x ] Breath Sounds equal B/L / Decrease, [  ] poor effort  [ x ] No rales, [ x ] No rhonchi  [ x ]  No wheezing,   HEART:  [ x ] Regular rate and rhythm, [  ] tachycardia, [  ] Bradycardia,  [  ] irregular  [ x ] No murmurs, No rubs, No gallops, [  ] PPM in place (Mfr:  )  ABDOMEN:  [ x ] Soft, [ x ] Nontender, [ x ] Nondistended, [  ] No mass, [  ] Bowel sounds present, [  ] obese  NERVOUS SYSTEM:  [ x ] Alert & Oriented X3, [ x ] Nonfocal  [  ] Confusion  [  ] Encephalopathic [  ] Sedated [  ] Unable to assess, [  ] Dementia [ x ] Other- 5/5 B/L LE strength. unable to assess low back pain or ROM due to pain.  EXTREMITIES: [ x ] 2+ Peripheral Pulses (radial, dorsalis pedis) , No clubbing, No cyanosis,  [  ] edema B/L lower EXT. [  ] PVD stasis skin changes B/L Lower EXT, [  ] wound  LYMPH: No lymphadenopathy noted  SKIN:  [ x ] No rashes or lesions, [  ] Pressure Ulcers, [  ] ecchymosis, [  ] Skin Tears, [  ] Other    DIET: Diet, Regular (23 @ 11:15)      LABS:                        9.0    4.82  )-----------( 211      ( 2023 06:00 )             27.1     2023 06:00    129    |  98     |  16     ----------------------------<  100    4.7     |  26     |  0.69     Ca    8.0        2023 06:00    TPro  5.7    /  Alb  2.9    /  TBili  0.3    /  DBili  x      /  AST  15     /  ALT  30     /  AlkPhos  95     2023 06:00      Urinalysis Basic - ( 2023 07:03 )    Color: Yellow / Appearance: Clear / S.038 / pH: x  Gluc: x / Ketone: Negative mg/dL  / Bili: Negative / Urobili: 0.2 mg/dL   Blood: x / Protein: Trace mg/dL / Nitrite: Negative   Leuk Esterase: Negative / RBC: x / WBC x   Sq Epi: x / Non Sq Epi: x / Bacteria: x                           Anemia Panel:      Thyroid Panel:        Lipase: 114 U/L (23 @ 22:10)          RADIOLOGY & ADDITIONAL TESTS:  < from: Xray Lumbar Spine AP + Lateral (23 @ 08:00) >  ACC: [id number] EXAM:  XR LS SPINE AP LAT 2-3 VIEWS   ORDERED BY: SD CABRERA     PROCEDURE DATE:  2023      IMPRESSION:  Acute fracture of the superior endplate of T12 and chronic compression   deformities, better characterized on concurrent CT    --- End of Report ---    < from: CT Angio Chest PE Protocol w/ IV Cont (23 @ 01:27) >  ACC: [id number] [facility]:  [facility]   ORDERED BY: MAVERICK KEITA     PROCEDURE DATE:  2023          INTERPRETATION:  CLINICAL INFORMATION:  Shortness of breath. Low back pain. History multiple myeloma.    COMPARISON: CT angiogram 2023.    CONTRAST/COMPLICATIONS:  IV Contrast: Omnipaque 350  90 cc administered   10 cc discarded  Oral Contrast: NONE  Complications: None reported at time of study completion    IMPRESSION:    Limited evaluation of the segmental pulmonary arterial vasculature,   otherwise no acutepulmonary embolism demonstrated as discussed.    Expansile lytic lesion left sixth rib likely reflecting patient's known   history of multiple myeloma.    Nondisplaced fracture left seventh rib.    Multiple compression fracture deformities as discussed.    No acute abdominal pathology.    Other findings as discussed above.    This study was preliminary reported by [facility].    --- End of Report ---    CANDICE LEWIS MD; Attending Radiologist  This document has been electronically signed. 2023  9:02AM    < end of copied text >  < from: CT Lumbar Spine No Cont (23 @ :) >  ACC: [id number] EXAM:  CT LUMBAR SPINE   ORDERED BY: MAVERICK KEITA     PROCEDURE DATE:  2023      INTERPRETATION:  CT LUMBAR SPINE    CLINICAL INFORMATION: pain, h/o MM    TECHNIQUE:  Noncontrast CT.  Axial acquisition. Sagittal and coronal reformations.    COMPARISON:  Prior CT of 2023 and MRI of 2023    IMPRESSION:  *  Limited by diffuse osteoporosis.  *  Acute fracture superior endplate of T12 with mild loss of height.  *  Increasing compression deformity superior endplate of T11. This   fracture was acute on prior MRI of 2023.  *  Chronic  compression deformities L1 and L4 vertebra. Stable loss of   height.  *  Well-defined lytic lucency left lamina of L3 measuring 1.2 x 0.6 x 1.2   cm may be related to patient's known multiple myeloma.    --- End of Report ---    VIJAY COMBS MD; Attending Radiologist  This document has been electronically signed. 2023      < end of copied text >  < from: CT Abdomen and Pelvis w/ IV Cont (23 @ 01:26) >      HEALTH ISSUES - PROBLEM Dx:  Multiple myeloma    Need for prophylactic measure    Hypertension    Anemia    Hyponatremia    Lumbar compression fracture            Consultant(s) Notes Reviewed:  [ x ] YES     Care Discussed with [X] Consultants  [ x ] Patient  [ x ] Family [  ] HCP [  ]   [  ] Social Service  [  ] RN, [  ] Physical Therapy,[  ] Palliative care team  DVT PPX: [ x ] Lovenox, [  ] S C Heparin, [  ] Coumadin, [  ] Xarelto, [  ] Eliquis, [  ] Pradaxa, [  ] IV Heparin drip, [  ] SCD [  ] Contraindication 2 to GI Bleed,[  ] Ambulation [  ] Contraindicated 2 to  bleed [  ] Contraindicated 2 to Brain Bleed  Advanced directive: [ x ] None, [  ] DNR/DNI   Patient is a 87y old  Female who presents with a chief complaint of Lumbar compression fracture (2023 10:29)    HPI:  88yo F with PMHx of MM (on chemo), Hypertension presents to ED c/o low back pain. History is provided by daughter (Katherine) at bedside. Pt started chemo for MM at the end of  with Heme/Onc Dr. San. Pt has chronic low back pain, however on , pt's low back pain became progressively worse. Today, pt could not stand or walk due to pain so she was brought to ED for management. Pt did not suffer from any inciting falls or trauma.     ED course:  Vitals: HR 75, /71, RR 22, SpO2 98% on RA  Labs: RBC 3.54, Hgb 9.8, Hct 30.1, Na 127, Ca 8.4, Alk phos 121  EKG: pending  CT chest: Expansile lucent/lytic lateral L 6th rib lesion compatible with reported history of multiple myeloma  CT Lumbar spine: Multilevel chronic vertebral body compression deformities at T11, L1, and L4. Superimposed acute or subacute components of the T11 and L1 fractures is not excluded  Given: IV Dilaudid 0.5mg x2, IV Zofran x1, 1L IV NS bolus x1 (2023 05:24)    INTERVAL HPI:  23 - Patient was seen and examined at bedside. No overnight events. Pt is Syriac speaking and daughter assisted in translating. Pt reports 8/10 low pack pain that is non-radiating. She has not received her stronger pain meds for it, so instructed to ask as needed. Pt reports no trauma to her low back. Reports history of fractures from her multiple myeloma. Otherwise Pt has no other complaints. Denies HA, n/v/d, chest pain, abdominal pain, SOB. Last bowel movement yesterday.     OVERNIGHT EVENTS: None    Home Medications:  acyclovir 400 mg oral tablet: 1 orally 2 times a day (2023 05:38)  dexAMETHasone 4 mg oral tablet: 5 tab(s) orally once a day Every Monday with chemo (2023 05:38)  Lenalidomide 25 mg oral capsule: 1 orally once a day (2023 05:38)  losartan 25 mg oral tablet: 0.5 tab(s) orally once a day (2023 05:38)  morphine 30 mg/24 hours oral capsule, extended release: 1 orally 2 times a day as needed for  severe pain (2023 05:38)  Percocet 5 mg-325 mg oral tablet: 1 orally 2 times a day as needed for  severe pain (2023 05:38)      MEDICATIONS  (STANDING):  acyclovir   Oral Tab/Cap 400 milliGRAM(s) Oral two times a day  enoxaparin Injectable 40 milliGRAM(s) SubCutaneous every 24 hours  losartan 12.5 milliGRAM(s) Oral daily  polyethylene glycol 3350 17 Gram(s) Oral daily  senna 2 Tablet(s) Oral at bedtime    MEDICATIONS  (PRN):  HYDROmorphone  Injectable 0.5 milliGRAM(s) IV Push every 6 hours PRN Moderate Pain (4 - 6)  melatonin 3 milliGRAM(s) Oral at bedtime PRN Insomnia  morphine  - Injectable 2 milliGRAM(s) IV Push every 6 hours PRN Severe Pain (7 - 10)  ondansetron Injectable 4 milliGRAM(s) IV Push every 8 hours PRN Nausea and/or Vomiting      Allergies    No Known Allergies    Intolerances        Social History:  Tobacco: denies  EtOH: denies   Recreational drug use: denies  Lives with: daughter  Ambulates: with a walker  ADLs: with assistance (2023 05:24)      REVIEW OF SYSTEMS: i am OK  CONSTITUTIONAL: No fever, No chills, No fatigue, No myalgia, No Body ache, No Weakness  EYES: No eye pain,  No visual disturbances, No discharge, NO Redness  ENMT:  No ear pain, No nose bleed, No vertigo; No sinus pain, NO throat pain, No Congestion  NECK: No pain, No stiffness  RESPIRATORY: No cough, NO wheezing, No  hemoptysis, NO  shortness of breath  CARDIOVASCULAR: No chest pain, palpitations  GASTROINTESTINAL: No abdominal pain, NO epigastric pain. No nausea, No vomiting; No diarrhea, No constipation. [ x ] BM - yesterday  GENITOURINARY: No dysuria, No frequency, No urgency, No hematuria, NO incontinence  NEUROLOGICAL: No headaches, No dizziness, No numbness, No tingling, No tremors, No weakness  EXT: No Swelling, No Pain, No Edema  SKIN:  [ x ] No itching, burning, rashes, or lesions   MUSCULOSKELETAL: No joint pain ,No Jt swelling; No muscle pain, No back pain, No extremity pain  PSYCHIATRIC: No depression,  No anxiety,  No mood swings ,No difficulty sleeping at night  PAIN SCALE: [  ] None  [ x ] Other- low back pain, non radiating.  REST OF REVIEW Of SYSTEM - [ x ] Normal     Vital Signs Last 24 Hrs  T(C): 36.8 (2023 08:03), Max: 36.8 (2023 08:03)  T(F): 98.2 (2023 08:03), Max: 98.2 (2023 08:03)  HR: 67 (2023 08:03) (67 - 78)  BP: 138/79 (2023 08:03) (130/71 - 141/74)  BP(mean): --  RR: 16 (2023 08:03) (16 - 22)  SpO2: 97% (2023 08:03) (96% - 98%)    Parameters below as of 2023 08:03  Patient On (Oxygen Delivery Method): room air      Finger Stick          PHYSICAL EXAM:  GENERAL:  [  ] NAD , [ x ] appears uncomfortable in pain, [  ] Agitated, [  ] Drowsy,  [  ] Lethargy, [  ] confused   HEAD:  [ x ] Normal, [  ] Other  EYES:  [ x ] EOMI, [ x ] PERRLA, [ x ] conjunctiva and sclera clear normal, [  ] Other,  [ x ] Pallor,[  ] Discharge  ENMT:  [ x ] Normal, [ x ] Moist mucous membranes, [  ] Good dentition, [  x] No Thrush  NECK:  [ x ] Supple, [x  ] No JVD, [ x ] Normal thyroid, [  ] Lymphadenopathy [  ] Other  CHEST/LUNG:  [ x ] Clear to auscultation bilaterally, [ x ] Breath Sounds equal B/L / Decrease, [  ] poor effort  [ x ] No rales, [ x ] No rhonchi  [ x ]  No wheezing,   HEART:  [ x ] Regular rate and rhythm, [  ] tachycardia, [  ] Bradycardia,  [  ] irregular  [ x ] No murmurs, No rubs, No gallops, [  ] PPM in place (Mfr:  )  ABDOMEN:  [ x ] Soft, [ x ] Nontender, [ x ] Nondistended, [ x ] No mass, [ x ] Bowel sounds present, [x  ] obese  NERVOUS SYSTEM:  [ x ] Alert & Oriented X3, [ x ] Nonfocal  [  ] Confusion  [  ] Encephalopathic [  ] Sedated [  ] Unable to assess, [  ] Dementia [ x ] Other- 5/5 B/L LE strength. unable to assess low back pain or ROM due to pain.  EXTREMITIES: [ x ] 2+ Peripheral Pulses (radial, dorsalis pedis) , No clubbing, No cyanosis,  [  ] edema B/L lower EXT. [  ] PVD stasis skin changes B/L Lower EXT, [  ] wound  LYMPH: No lymphadenopathy noted  SKIN:  [ x ] No rashes or lesions, [  ] Pressure Ulcers, [  ] ecchymosis, [  ] Skin Tears, [  ] Other    DIET: Diet, Regular (23 @ 11:15)      LABS:                        9.0    4.82  )-----------( 211      ( 2023 06:00 )             27.1     2023 06:00    129    |  98     |  16     ----------------------------<  100    4.7     |  26     |  0.69     Ca    8.0        2023 06:00    TPro  5.7    /  Alb  2.9    /  TBili  0.3    /  DBili  x      /  AST  15     /  ALT  30     /  AlkPhos  95     2023 06:00      Urinalysis Basic - ( 2023 07:03 )    Color: Yellow / Appearance: Clear / S.038 / pH: x  Gluc: x / Ketone: Negative mg/dL  / Bili: Negative / Urobili: 0.2 mg/dL   Blood: x / Protein: Trace mg/dL / Nitrite: Negative   Leuk Esterase: Negative / RBC: x / WBC x   Sq Epi: x / Non Sq Epi: x / Bacteria: x                           Anemia Panel:      Thyroid Panel:        Lipase: 114 U/L (23 @ 22:10)          RADIOLOGY & ADDITIONAL TESTS:  < from: Xray Lumbar Spine AP + Lateral (23 @ 08:00) >  ACC: [id number] EXAM:  XR LS SPINE AP LAT 2-3 VIEWS   ORDERED BY: DS CABRERA     PROCEDURE DATE:  2023      IMPRESSION:  Acute fracture of the superior endplate of T12 and chronic compression   deformities, better characterized on concurrent CT    --- End of Report ---    < from: CT Angio Chest PE Protocol w/ IV Cont (23 @ 01:27) >  ACC: [id number] EXAM:  CT ANGIO CHEST PULM ART WAWIC   ORDERED BY: MAVERICK KEITA     PROCEDURE DATE:  2023          INTERPRETATION:  CLINICAL INFORMATION:  Shortness of breath. Low back pain. History multiple myeloma.    COMPARISON: CT angiogram 2023.    CONTRAST/COMPLICATIONS:  IV Contrast: Omnipaque 350  90 cc administered   10 cc discarded  Oral Contrast: NONE  Complications: None reported at time of study completion    IMPRESSION:    Limited evaluation of the segmental pulmonary arterial vasculature,   otherwise no acutepulmonary embolism demonstrated as discussed.    Expansile lytic lesion left sixth rib likely reflecting patient's known   history of multiple myeloma.    Nondisplaced fracture left seventh rib.    Multiple compression fracture deformities as discussed.    No acute abdominal pathology.    Other findings as discussed above.    This study was preliminary reported by [facility].    --- End of Report ---    CANDICE LEWIS MD; Attending Radiologist  This document has been electronically signed. 2023  9:02AM    < end of copied text >  < from: CT Lumbar Spine No Cont (23 @ 01:26) >  ACC: [id number] EXAM:  CT LUMBAR SPINE   ORDERED BY: MAVERICK KEITA     PROCEDURE DATE:  2023      INTERPRETATION:  CT LUMBAR SPINE    CLINICAL INFORMATION: pain, h/o MM    TECHNIQUE:  Noncontrast CT.  Axial acquisition. Sagittal and coronal reformations.    COMPARISON:  Prior CT of 2023 and MRI of 2023    IMPRESSION:  *  Limited by diffuse osteoporosis.  *  Acute fracture superior endplate of T12 with mild loss of height.  *  Increasing compression deformity superior endplate of T11. This   fracture was acute on prior MRI of 2023.  *  Chronic  compression deformities L1 and L4 vertebra. Stable loss of   height.  *  Well-defined lytic lucency left lamina of L3 measuring 1.2 x 0.6 x 1.2   cm may be related to patient's known multiple myeloma.    --- End of Report ---    VIJAY COMBS MD; Attending Radiologist  This document has been electronically signed. 2023      < end of copied text >  < from: CT Abdomen and Pelvis w/ IV Cont (23 @ 01:26) >      HEALTH ISSUES - PROBLEM Dx:  Multiple myeloma    Need for prophylactic measure    Hypertension    Anemia    Hyponatremia    Lumbar compression fracture            Consultant(s) Notes Reviewed:  [ x ] YES     Care Discussed with [X] Consultants  [ x ] Patient  [ x ] Family [  ] HCP [  ]   [  ] Social Service  [x  ] RN, [  ] Physical Therapy,[  ] Palliative care team  DVT PPX: [ x ] Lovenox, [  ] S C Heparin, [  ] Coumadin, [  ] Xarelto, [  ] Eliquis, [  ] Pradaxa, [  ] IV Heparin drip, [  ] SCD [  ] Contraindication 2 to GI Bleed,[  ] Ambulation [  ] Contraindicated 2 to  bleed [  ] Contraindicated 2 to Brain Bleed  Advanced directive: [ x ] None, [  ] DNR/DNI   Patient is a 87y old  Female who presents with a chief complaint of Lumbar compression fracture (2023 10:29)    HPI:  86yo F with PMHx of MM (on chemo), Hypertension presents to ED c/o low back pain. History is provided by daughter (Katherine) at bedside. Pt started chemo for MM at the end of  with Heme/Onc Dr. San. Pt has chronic low back pain, however on , pt's low back pain became progressively worse. Today, pt could not stand or walk due to pain so she was brought to ED for management. Pt did not suffer from any inciting falls or trauma.     ED course:  Vitals: HR 75, /71, RR 22, SpO2 98% on RA  Labs: RBC 3.54, Hgb 9.8, Hct 30.1, Na 127, Ca 8.4, Alk phos 121  EKG: pending  CT chest: Expansile lucent/lytic lateral L 6th rib lesion compatible with reported history of multiple myeloma  CT Lumbar spine: Multilevel chronic vertebral body compression deformities at T11, L1, and L4. Superimposed acute or subacute components of the T11 and L1 fractures is not excluded  Given: IV Dilaudid 0.5mg x2, IV Zofran x1, 1L IV NS bolus x1 (2023 05:24)    INTERVAL HPI:  23 - Patient was seen and examined at bedside. No overnight events. Pt is Moh[place] speaking and daughter assisted in translating. Pt reports 8/10 low pack pain that is non-radiating. She has not received her stronger pain meds for it, so instructed to ask as needed. Pt reports no trauma to her low back. Reports history of fractures from her multiple myeloma. Otherwise Pt has no other complaints. Denies HA, n/v/d, chest pain, abdominal pain, SOB. Last bowel movement yesterday.     OVERNIGHT EVENTS: None    Home Medications:  acyclovir 400 mg oral tablet: 1 orally 2 times a day (2023 05:38)  dexAMETHasone 4 mg oral tablet: 5 tab(s) orally once a day Every Monday with chemo (2023 05:38)  Lenalidomide 25 mg oral capsule: 1 orally once a day (2023 05:38)  losartan 25 mg oral tablet: 0.5 tab(s) orally once a day (2023 05:38)  morphine 30 mg/24 hours oral capsule, extended release: 1 orally 2 times a day as needed for  severe pain (2023 05:38)  Percocet 5 mg-325 mg oral tablet: 1 orally 2 times a day as needed for  severe pain (2023 05:38)      MEDICATIONS  (STANDING):  acyclovir   Oral Tab/Cap 400 milliGRAM(s) Oral two times a day  enoxaparin Injectable 40 milliGRAM(s) SubCutaneous every 24 hours  losartan 12.5 milliGRAM(s) Oral daily  polyethylene glycol 3350 17 Gram(s) Oral daily  senna 2 Tablet(s) Oral at bedtime    MEDICATIONS  (PRN):  HYDROmorphone  Injectable 0.5 milliGRAM(s) IV Push every 6 hours PRN Moderate Pain (4 - 6)  melatonin 3 milliGRAM(s) Oral at bedtime PRN Insomnia  morphine  - Injectable 2 milliGRAM(s) IV Push every 6 hours PRN Severe Pain (7 - 10)  ondansetron Injectable 4 milliGRAM(s) IV Push every 8 hours PRN Nausea and/or Vomiting      Allergies    No Known Allergies    Intolerances        Social History:  Tobacco: denies  EtOH: denies   Recreational drug use: denies  Lives with: daughter  Ambulates: with a walker  ADLs: with assistance (2023 05:24)      REVIEW OF SYSTEMS: i am OK  CONSTITUTIONAL: No fever, No chills, No fatigue, No myalgia, No Body ache, No Weakness  EYES: No eye pain,  No visual disturbances, No discharge, NO Redness  ENMT:  No ear pain, No nose bleed, No vertigo; No sinus pain, NO throat pain, No Congestion  NECK: No pain, No stiffness  RESPIRATORY: No cough, NO wheezing, No  hemoptysis, NO  shortness of breath  CARDIOVASCULAR: No chest pain, palpitations  GASTROINTESTINAL: No abdominal pain, NO epigastric pain. No nausea, No vomiting; No diarrhea, No constipation. [ x ] BM - yesterday  GENITOURINARY: No dysuria, No frequency, No urgency, No hematuria, NO incontinence  NEUROLOGICAL: No headaches, No dizziness, No numbness, No tingling, No tremors, No weakness  EXT: No Swelling, No Pain, No Edema  SKIN:  [ x ] No itching, burning, rashes, or lesions   MUSCULOSKELETAL: No joint pain ,No Jt swelling; No muscle pain, No back pain, No extremity pain  PSYCHIATRIC: No depression,  No anxiety,  No mood swings ,No difficulty sleeping at night  PAIN SCALE: [  ] None  [ x ] Other- low back pain, non radiating.  REST OF REVIEW Of SYSTEM - [ x ] Normal     Vital Signs Last 24 Hrs  T(C): 36.8 (2023 08:03), Max: 36.8 (2023 08:03)  T(F): 98.2 (2023 08:03), Max: 98.2 (2023 08:03)  HR: 67 (2023 08:03) (67 - 78)  BP: 138/79 (2023 08:03) (130/71 - 141/74)  BP(mean): --  RR: 16 (2023 08:03) (16 - 22)  SpO2: 97% (2023 08:03) (96% - 98%)    Parameters below as of 2023 08:03  Patient On (Oxygen Delivery Method): room air      Finger Stick          PHYSICAL EXAM:  GENERAL:  [  ] NAD , [ x ] appears uncomfortable in pain, [  ] Agitated, [  ] Drowsy,  [  ] Lethargy, [  ] confused   HEAD:  [ x ] Normal, [  ] Other  EYES:  [ x ] EOMI, [ x ] PERRLA, [ x ] conjunctiva and sclera clear normal, [  ] Other,  [ x ] Pallor,[  ] Discharge  ENMT:  [ x ] Normal, [ x ] Moist mucous membranes, [  ] Good dentition, [  x] No Thrush  NECK:  [ x ] Supple, [x  ] No JVD, [ x ] Normal thyroid, [  ] Lymphadenopathy [  ] Other  CHEST/LUNG:  [ x ] Clear to auscultation bilaterally, [ x ] Breath Sounds equal B/L / Decrease, [  ] poor effort  [ x ] No rales, [ x ] No rhonchi  [ x ]  No wheezing,   HEART:  [ x ] Regular rate and rhythm, [  ] tachycardia, [  ] Bradycardia,  [  ] irregular  [ x ] No murmurs, No rubs, No gallops, [  ] PPM in place (Mfr:  )  ABDOMEN:  [ x ] Soft, [ x ] Nontender, [ x ] Nondistended, [ x ] No mass, [ x ] Bowel sounds present, [x  ] obese  NERVOUS SYSTEM:  [ x ] Alert & Oriented X3, [ x ] Nonfocal  [  ] Confusion  [  ] Encephalopathic [  ] Sedated [  ] Unable to assess, [  ] Dementia [ x ] Other- 5/5 B/L LE strength. unable to assess low back pain or ROM due to pain.  EXTREMITIES: [ x ] 2+ Peripheral Pulses (radial, dorsalis pedis) , No clubbing, No cyanosis,  [  ] edema B/L lower EXT. [  ] PVD stasis skin changes B/L Lower EXT, [  ] wound  LYMPH: No lymphadenopathy noted  SKIN:  [ x ] No rashes or lesions, [  ] Pressure Ulcers, [  ] ecchymosis, [  ] Skin Tears, [  ] Other    DIET: Diet, Regular (23 @ 11:15)      LABS:                        9.0    4.82  )-----------( 211      ( 2023 06:00 )             27.1     2023 06:00    129    |  98     |  16     ----------------------------<  100    4.7     |  26     |  0.69     Ca    8.0        2023 06:00    TPro  5.7    /  Alb  2.9    /  TBili  0.3    /  DBili  x      /  AST  15     /  ALT  30     /  AlkPhos  95     2023 06:00      Urinalysis Basic - ( 2023 07:03 )    Color: Yellow / Appearance: Clear / S.038 / pH: x  Gluc: x / Ketone: Negative mg/dL  / Bili: Negative / Urobili: 0.2 mg/dL   Blood: x / Protein: Trace mg/dL / Nitrite: Negative   Leuk Esterase: Negative / RBC: x / WBC x   Sq Epi: x / Non Sq Epi: x / Bacteria: x        Lipase: 114 U/L (23 @ 22:10)          RADIOLOGY & ADDITIONAL TESTS:    < from: Xray Lumbar Spine AP + Lateral (23 @ 08:00) >  ACC: [id number] EXAM:  XR LS SPINE AP LAT 2-3 VIEWS   ORDERED BY: SD CABRERA     PROCEDURE DATE:  2023      IMPRESSION:  Acute fracture of the superior endplate of T12 and chronic compression   deformities, better characterized on concurrent CT    --- End of Report ---    Expansile lytic lesion left sixth rib likely reflecting patient's known   history of multiple myeloma.    Nondisplaced fracture left seventh rib.    Multiple compression fracture deformities as discussed.    No acute abdominal pathology.    Other findings as discussed above.    This study was preliminary reported by [facility].    --- End of Report ---    CANDICE LEWIS MD; Attending Radiologist  This document has been electronically signed. 2023  9:02AM    < end of copied text >  < from: CT Lumbar Spine No Cont (23 @ :) >  ACC: [id number] EXAM:  CT LUMBAR SPINE   ORDERED BY: MAVERICK KEITA     PROCEDURE DATE:  2023      INTERPRETATION:  CT LUMBAR SPINE    CLINICAL INFORMATION: pain, h/o MM    TECHNIQUE:  Noncontrast CT.  Axial acquisition. Sagittal and coronal reformations.    COMPARISON:  Prior CT of 2023 and MRI of 2023    IMPRESSION:  *  Limited by diffuse osteoporosis.  *  Acute fracture superior endplate of T12 with mild loss of height.  *  Increasing compression deformity superior endplate of T11. This   fracture was acute on prior MRI of 2023.  *  Chronic  compression deformities L1 and L4 vertebra. Stable loss of   height.  *  Well-defined lytic lucency left lamina of L3 measuring 1.2 x 0.6 x 1.2   cm may be related to patient's known multiple myeloma.    --- End of Report ---    VIJAY COMBS MD; Attending Radiologist  This document has been electronically signed. 2023      < end of copied text >  < from: CT Abdomen and Pelvis w/ IV Cont (23 @ :) >      HEALTH ISSUES - PROBLEM Dx:  Multiple myeloma    Need for prophylactic measure    Hypertension    Anemia    Hyponatremia    Lumbar compression fracture            Consultant(s) Notes Reviewed:  [ x ] YES     Care Discussed with [X] Consultants  [ x ] Patient  [ x ] Family [  ] HCP [  ]   [  ] Social Service  [x  ] RN, [  ] Physical Therapy,[  ] Palliative care team  DVT PPX: [ x ] Lovenox, [  ] S C Heparin, [  ] Coumadin, [  ] Xarelto, [  ] Eliquis, [  ] Pradaxa, [  ] IV Heparin drip, [  ] SCD [  ] Contraindication 2 to GI Bleed,[  ] Ambulation [  ] Contraindicated 2 to  bleed [  ] Contraindicated 2 to Brain Bleed  Advanced directive: [ x ] None, [  ] DNR/DNI

## 2023-07-27 NOTE — CARE COORDINATION ASSESSMENT. - NSPASTMEDSURGHISTORY_GEN_ALL_CORE_FT
PAST MEDICAL & SURGICAL HISTORY:  GERD (gastroesophageal reflux disease)      Hypertension      Ankle fracture, left      Ankle fracture, left  1980 (Hardware placed)      Regurgitation of food      Back pain with sciatica  Sciatica affects LEFT Side      Dyspnea on exertion      Chronic low back pain      Chronic cough      Benign essential hypertension      Gastro-esophageal reflux disease without esophagitis      History of tonsillectomy  As a child      H/O colonoscopy      H/O endoscopy      Fall at home  March 2023      Rib fracture  right May 2023      History of repair of hiatal hernia  Oct 2022

## 2023-07-27 NOTE — H&P ADULT - GASTROINTESTINAL
normal/soft/nontender/nondistended/normal active bowel sounds details… normal/soft/nontender/nondistended/normal active bowel sounds/no guarding/no rigidity/no organomegaly/no palpable florian/no masses palpable

## 2023-07-27 NOTE — H&P ADULT - ASSESSMENT
88yo F with PMHx of MM (on chemo), Hypertension presents to ED c/o low back pain, admitted for lumbar compression fracture 2/2 MM. 88yo F with PMHx of MM (on chemo), Hypertension presents to ED c/o low back pain, admitted for lumbar compression fracture likely 2/2 MM.

## 2023-07-27 NOTE — CONSULT NOTE ADULT - SUBJECTIVE AND OBJECTIVE BOX
INCOMPLETE NOTE.  Documentation in Progress  PT SEEN AND EVALUATED.   FULL/ADDITIONAL RECOMMENDATIONS TO FOLLOW   ***************************************************************  Patient is a 87y old  Female who presents with a chief complaint of Lumbar compression fracture (2023 13:37)    HPI:  86yo F with PMHx of MM (on chemo), Hypertension presents to ED c/o low back pain. History is provided by daughter (Katherine) at bedside. Pt started chemo for MM at the end of  with Heme/Onc Dr. San. Pt has chronic low back pain, however on , pt's low back pain became progressively worse. Today, pt could not stand or walk due to pain so she was brought to ED for management. Pt did not suffer from any inciting falls or trauma.     ED course:  Vitals: HR 75, /71, RR 22, SpO2 98% on RA  Labs: RBC 3.54, Hgb 9.8, Hct 30.1, Na 127, Ca 8.4, Alk phos 121  EKG: pending  CT chest: Expansile lucent/lytic lateral L 6th rib lesion compatible with reported history of multiple myeloma  CT Lumbar spine: Multilevel chronic vertebral body compression deformities at T11, L1, and L4. Superimposed acute or subacute components of the T11 and L1 fractures is not excluded  Given: IV Dilaudid 0.5mg x2, IV Zofran x1, 1L IV NS bolus x1 (2023 05:24)    PAST MEDICAL & SURGICAL HISTORY:  Hypertension      GERD (gastroesophageal reflux disease)      Ankle fracture, left      Gastro-esophageal reflux disease without esophagitis      Benign essential hypertension      Chronic cough      Chronic low back pain      Dyspnea on exertion      Back pain with sciatica  Sciatica affects LEFT Side      Regurgitation of food      Rib fracture  right May 2023      Fall at home  2023      Ankle fracture, left   (Hardware placed)      H/O endoscopy      H/O colonoscopy      History of tonsillectomy  As a child      History of repair of hiatal hernia  Oct 2022         HEALTH ISSUES - PROBLEM Dx:  Multiple myeloma    Need for prophylactic measure    Hypertension    Anemia    Hyponatremia    Lumbar compression fracture          Wedge compression fracture of unspecified lumbar vertebra, initial encounter for closed fracture [S32.000A]    Hypertension [I10]    GERD (gastroesophageal reflux disease) [K21.9]    History of ankle surgery [Z98.890]    Ankle fracture, left [S82.892A]    Gastro-esophageal reflux disease without esophagitis [K21.9]    Benign essential hypertension [I10]    Chronic cough [R05.3]    Chronic low back pain [M54.50]    Dyspnea on exertion [R06.09]    Back pain with sciatica [M54.30]    Regurgitation of food [R11.10]    Rib fracture [S22.39XA]    Fall at home [W19.XXXA]    Ankle fracture, left [S82.892A]    H/O endoscopy [Z98.890]    H/O colonoscopy [Z98.890]    History of tonsillectomy [Z90.89]    History of repair of hiatal hernia [Z98.890]    Multiple myeloma [C90.00]    Hyponatremia [E87.1]      FAMILY HISTORY:  No pertinent family history in first degree relatives        [SOCIAL HISTORY: ]     smoking:       EtOH:       illicit drugs:       occupation:       marital status:       Other:     [ALLERGIES/INTOLERANCES:]  Allergies    No Known Allergies    Intolerances        [MEDICATIONS]  MEDICATIONS  (STANDING):  acyclovir   Oral Tab/Cap 400 milliGRAM(s) Oral two times a day  enoxaparin Injectable 40 milliGRAM(s) SubCutaneous every 24 hours  losartan 12.5 milliGRAM(s) Oral daily  polyethylene glycol 3350 17 Gram(s) Oral daily  senna 2 Tablet(s) Oral at bedtime  sodium chloride 1 Gram(s) Oral every 6 hours    MEDICATIONS  (PRN):  HYDROmorphone  Injectable 0.25 milliGRAM(s) IV Push every 6 hours PRN Moderate Pain (4 - 6)  HYDROmorphone  Injectable 0.5 milliGRAM(s) IV Push every 6 hours PRN Severe Pain (7 - 10)  melatonin 3 milliGRAM(s) Oral at bedtime PRN Insomnia  ondansetron Injectable 4 milliGRAM(s) IV Push every 8 hours PRN Nausea and/or Vomiting      [REVIEW OF SYSTEMS: ]  CONSTITUTIONAL: normal, no fever, no shakes, no chills   EYES: No eye pain, no visual disturbances, no discharge  ENMT:  no discharge  NECK: No pain, no stiffness  BREASTS: No pain, no masses, no nipple discharge  RESPIRATORY: No cough, no wheezing, no chills, no hemoptysis; No shortness of breath  CARDIOVASCULAR: No chest pain, no palpitations, no dizziness, no leg swelling  GASTROINTESTINAL: No abdominal, no epigastric pain. No nausea, no vomiting, no hematemesis; No diarrhea , no constipation. No melena, no hematochezia.  GENITOURINARY: No dysuria, no frequency, no hematuria, no incontinence  NEUROLOGICAL: No headaches, no memory loss, no loss of strength, no numbness, no tremors  SKIN: No itching, no burning, no rashes, no lesions   LYMPH NODES: No enlarged glands  ENDOCRINE: No heat or cold intolerance; No hair loss  MUSCULOSKELETAL: No joint pain or swelling; No muscle, no back, no extremity pain  PSYCHIATRIC: No depression, no anxiety, no mood swings, no difficulty sleeping  HEME/LYMPH: No easy bruising, no bleeding gums    [VITALS SIGNS 24hrs]  Vital Signs Last 24 Hrs  T(C): 36.9 (2023 20:23), Max: 36.9 (2023 20:23)  T(F): 98.4 (2023 20:23), Max: 98.4 (2023 20:23)  HR: 90 (2023 20:23) (67 - 90)  BP: 125/71 (2023 20:23) (125/71 - 141/74)  BP(mean): --  RR: 20 (2023 20:23) (16 - 22)  SpO2: 93% (2023 20:23) (93% - 98%)    Parameters below as of 2023 20:23  Patient On (Oxygen Delivery Method): room air      Daily Height in cm: 149.86 (2023 20:58)    Daily     I&O's Summary      [PHYSICAL EXAM]  GEN:   HEENT: normocephalic and atraumatic. EOMI. PERRL.    NECK: Supple.  No lymphadenopathy   LUNGS: Clear to auscultation.  HEART: S1S2 Regular rate and rhythm, no MRG  ABDOMEN: Soft, nontender, and nondistended.  Positive bowel sounds.    : No CVA tenderness  EXTREMITIES: Without edema.  NEUROLOGIC: grossly intact.  PSYCHIATRIC: Appropriate affect .  SKIN: No rash     [LABS: ]                        9.0    4.82  )-----------( 211      ( 2023 06:00 )             27.1     CBC Full  -  ( 2023 06:00 )  WBC Count : 4.82 K/uL  RBC Count : 3.21 M/uL  Hemoglobin : 9.0 g/dL  Hematocrit : 27.1 %  Platelet Count - Automated : 211 K/uL  Mean Cell Volume : 84.4 fl  Mean Cell Hemoglobin : 28.0 pg  Mean Cell Hemoglobin Concentration : 33.2 gm/dL  Auto Neutrophil # : 2.24 K/uL  Auto Lymphocyte # : 1.14 K/uL  Auto Monocyte # : 0.90 K/uL  Auto Eosinophil # : 0.44 K/uL  Auto Basophil # : 0.07 K/uL  Auto Neutrophil % : 46.4 %  Auto Lymphocyte % : 23.7 %  Auto Monocyte % : 18.7 %  Auto Eosinophil % : 9.1 %  Auto Basophil % : 1.5 %        129<L>  |  98  |  16  ----------------------------<  100<H>  4.7   |  26  |  0.69    Ca    8.0<L>      2023 06:00    TPro  5.7<L>  /  Alb  2.9<L>  /  TBili  0.3  /  DBili  x   /  AST  15  /  ALT  30  /  AlkPhos  95        LIVER FUNCTIONS - ( 2023 06:00 )  Alb: 2.9 g/dL / Pro: 5.7 g/dL / ALK PHOS: 95 U/L / ALT: 30 U/L / AST: 15 U/L / GGT: x               Urinalysis Basic - ( 2023 07:03 )    Color: Yellow / Appearance: Clear / S.038 / pH: x  Gluc: x / Ketone: Negative mg/dL  / Bili: Negative / Urobili: 0.2 mg/dL   Blood: x / Protein: Trace mg/dL / Nitrite: Negative   Leuk Esterase: Negative / RBC: x / WBC x   Sq Epi: x / Non Sq Epi: x / Bacteria: x          CBC TREND (5 Days)  WBC Count: 4.82 K/uL ( @ 06:00)  WBC Count: 5.88 K/uL ( @ 22:10)    Hemoglobin: 9.0 g/dL (07-27 @ 06:00)  Hemoglobin: 9.8 g/dL ( 22:10)    Hematocrit: 27.1 % (:)  Hematocrit: 30.1 % (:10)    Platelet Count - Automated: 211 K/uL (:)  Platelet Count - Automated: 244 K/uL (:10)    Reticulocyte Percent: 1.2 % (05-18 @ 10:)      Ferritin, Serum: 72 ng/mL (05-18 @ 10:00)    Iron Total, Serum: 57 ug/dL (05-18 @ 10:00)     Vitamin B12, Serum: >2000 pg/mL (05-18 @ 10:)     Folate, Serum: 10.2 ng/mL (05-18 @ 10:)     Sedimentation Rate, Erythrocyte: 14 mm/hr (:)  Sedimentation Rate, Erythrocyte: 97 mm/hr (05-18 @ 10:00)      Serum Protein Electrophoresis Interp: One Beta-Migrating Paraprotein and One Weak Gamma-Migrating Paraprotein  Identified (05-18 @ 10:00)  Serum Protein Electrophoresis Interp: Beta-Migrating Paraprotein Identified ( 05:40)        Urine Electrophoresis Interpretation: One Gamma-Migrating Paraprotein and One Weak Gamma-Migrating Paraprotein  Identified (:25)     Immunofixation, Urine:   Reference Range: None Detected ( 09:25)    Quantitative Ig mg/dL ( 10:00)  Quantitative IgM: 49 mg/dL (05-18 @ 10:00)  Quantitative IgA: 1140 mg/dL (05-18 @ 10:00)     [place]/Lambda Free Light Chain Ratio, Serum: 0.00 Ratio (05-18 @ 10:00)  [place]/Lambda Free Light Chain Ratio, Serum: 0.00 Ratio ( 10:00)       [MICROBIOLOGY /  VIROLOGY:]  COVID-19 PCR: Pedro (2023 21:55)        [PATHOLOGY]     [RADIOLOGY & ADDITIONAL STUDIES:]    Patient is a 87y old  Female who presents with a chief complaint of Lumbar compression fracture (2023 13:37)    HPI:  88yo F with PMHx of MM (on chemo), Hypertension presents to ED c/o low back pain. History is provided by daughter (Katherine) at bedside. Pt started chemo for MM at the end of  with Heme/Onc Dr. San. Pt has chronic low back pain, however on , pt's low back pain became progressively worse. Today, pt could not stand or walk due to pain so she was brought to ED for management. Pt did not suffer from any inciting falls or trauma.     ED course:  Vitals: HR 75, /71, RR 22, SpO2 98% on RA  Labs: RBC 3.54, Hgb 9.8, Hct 30.1, Na 127, Ca 8.4, Alk phos 121  EKG: pending  CT chest: Expansile lucent/lytic lateral L 6th rib lesion compatible with reported history of multiple myeloma  CT Lumbar spine: Multilevel chronic vertebral body compression deformities at T11, L1, and L4. Superimposed acute or subacute components of the T11 and L1 fractures is not excluded  Given: IV Dilaudid 0.5mg x2, IV Zofran x1, 1L IV NS bolus x1 (2023 05:24)    PAST MEDICAL & SURGICAL HISTORY:  Hypertension  GERD (gastroesophageal reflux disease)  Ankle fracture, left  Gastro-esophageal reflux disease without esophagitis  Benign essential hypertension  Chronic cough  Chronic low back pain  Dyspnea on exertion  Back pain with sciatica  Sciatica affects LEFT Side  Regurgitation of food  Rib fracture right May 2023  Fall at home 2023  Ankle fracture, left  (Hardware placed)  H/O endoscopy  H/O colonoscopy  History of tonsillectomy As a child  History of repair of hiatal hernia Oct 2022      HEALTH ISSUES - PROBLEM Dx:  Multiple myeloma  Need for prophylactic measure  Hypertension  Anemia  Hyponatremia  Lumbar compression fracture    Wedge compression fracture of unspecified lumbar vertebra, initial encounter for closed fracture [S32.000A]  Hypertension [I10]  GERD (gastroesophageal reflux disease) [K21.9]  History of ankle surgery [Z98.890]  Ankle fracture, left [S82.892A]  Gastro-esophageal reflux disease without esophagitis [K21.9]  Benign essential hypertension [I10]  Chronic cough [R05.3]  Chronic low back pain [M54.50]  Dyspnea on exertion [R06.09]  Back pain with sciatica [M54.30]  Regurgitation of food [R11.10]  Rib fracture [S22.39XA]  Fall at home [W19.XXXA]  Ankle fracture, left [S82.892A]  H/O endoscopy [Z98.890]  H/O colonoscopy [Z98.890]  History of tonsillectomy [Z90.89]  History of repair of hiatal hernia [Z98.890]  Multiple myeloma [C90.00]  Hyponatremia [E87.1]    FAMILY HISTORY:  No pertinent family history in first degree relatives        [SOCIAL HISTORY: ]     smoking:  no cig     EtOH:  no EtOH     illicit drugs:  no illicit drugs     occupation:  retired     marital status:       Other: lives with dtr Rosia    [ALLERGIES/INTOLERANCES:]  Allergies     No Known Allergies  Intolerances        [MEDICATIONS]  MEDICATIONS  (STANDING):  acyclovir   Oral Tab/Cap 400 milliGRAM(s) Oral two times a day  enoxaparin Injectable 40 milliGRAM(s) SubCutaneous every 24 hours  losartan 12.5 milliGRAM(s) Oral daily  polyethylene glycol 3350 17 Gram(s) Oral daily  senna 2 Tablet(s) Oral at bedtime  sodium chloride 1 Gram(s) Oral every 6 hours    MEDICATIONS  (PRN):  HYDROmorphone  Injectable 0.25 milliGRAM(s) IV Push every 6 hours PRN Moderate Pain (4 - 6)  HYDROmorphone  Injectable 0.5 milliGRAM(s) IV Push every 6 hours PRN Severe Pain (7 - 10)  melatonin 3 milliGRAM(s) Oral at bedtime PRN Insomnia  ondansetron Injectable 4 milliGRAM(s) IV Push every 8 hours PRN Nausea and/or Vomiting      [REVIEW OF SYSTEMS: ]  CONSTITUTIONAL: normal, no fever, no shakes, no chills   EYES: No eye pain, no visual disturbances, no discharge  ENMT:  no discharge  NECK: No pain, no stiffness  BREASTS: No pain, no masses, no nipple discharge  RESPIRATORY: No cough, no wheezing, no chills, no hemoptysis; No shortness of breath  CARDIOVASCULAR: No chest pain, no palpitations, no dizziness, no leg swelling  GASTROINTESTINAL: No abdominal, no epigastric pain. No nausea, no vomiting, no hematemesis; No diarrhea , no constipation. No melena, no hematochezia.  GENITOURINARY: No dysuria, no frequency, no hematuria, no incontinence  NEUROLOGICAL: No headaches, no memory loss, no loss of strength, no numbness, no tremors  SKIN: No itching, no burning, no rashes, no lesions   LYMPH NODES: No enlarged glands  ENDOCRINE: No heat or cold intolerance; No hair loss  MUSCULOSKELETAL: No joint pain or swelling; No muscle, +Pain back, no extremity pain  PSYCHIATRIC: No depression, no anxiety, no mood swings, no difficulty sleeping  HEME/LYMPH: No easy bruising, no bleeding gums    [VITALS SIGNS 24hrs]  Vital Signs Last 24 Hrs  T(C): 36.9 (2023 20:23), Max: 36.9 (2023 20:23)  T(F): 98.4 (2023 20:23), Max: 98.4 (2023 20:23)  HR: 90 (2023 20:23) (67 - 90)  BP: 125/71 (2023 20:23) (125/71 - 141/74)  BP(mean): --  RR: 20 (2023 20:23) (16 - 22)  SpO2: 93% (2023 20:23) (93% - 98%)    Parameters below as of 2023 20:23  Patient On (Oxygen Delivery Method): room air      Daily Height in cm: 149.86 (2023 20:58)    Daily     I&O's Summary      [PHYSICAL EXAM]  GEN:   HEENT: normocephalic and atraumatic. EOMI. PERRL.    NECK: Supple.  No lymphadenopathy   LUNGS: Clear to auscultation.  HEART: S1S2 Regular rate and rhythm, no MRG  ABDOMEN: Soft, nontender, and nondistended.  Positive bowel sounds.    : No CVA tenderness  EXTREMITIES: Without edema.  NEUROLOGIC: grossly intact.  PSYCHIATRIC: Appropriate affect .  SKIN: No rash     [LABS: ]                        9.0    4.82  )-----------( 211      ( 2023 06:00 )             27.1     CBC Full  -  ( 2023 06:00 )  WBC Count : 4.82 K/uL  RBC Count : 3.21 M/uL  Hemoglobin : 9.0 g/dL  Hematocrit : 27.1 %  Platelet Count - Automated : 211 K/uL  Mean Cell Volume : 84.4 fl  Mean Cell Hemoglobin : 28.0 pg  Mean Cell Hemoglobin Concentration : 33.2 gm/dL  Auto Neutrophil # : 2.24 K/uL  Auto Lymphocyte # : 1.14 K/uL  Auto Monocyte # : 0.90 K/uL  Auto Eosinophil # : 0.44 K/uL  Auto Basophil # : 0.07 K/uL  Auto Neutrophil % : 46.4 %  Auto Lymphocyte % : 23.7 %  Auto Monocyte % : 18.7 %  Auto Eosinophil % : 9.1 %  Auto Basophil % : 1.5 %        129<L>  |  98  |  16  ----------------------------<  100<H>  4.7   |  26  |  0.69    Ca    8.0<L>      2023 06:00    TPro  5.7<L>  /  Alb  2.9<L>  /  TBili  0.3  /  DBili  x   /  AST  15  /  ALT  30  /  AlkPhos  95        LIVER FUNCTIONS - ( 2023 06:00 )  Alb: 2.9 g/dL / Pro: 5.7 g/dL / ALK PHOS: 95 U/L / ALT: 30 U/L / AST: 15 U/L / GGT: x               Urinalysis Basic - ( 2023 07:03 )    Color: Yellow / Appearance: Clear / S.038 / pH: x  Gluc: x / Ketone: Negative mg/dL  / Bili: Negative / Urobili: 0.2 mg/dL   Blood: x / Protein: Trace mg/dL / Nitrite: Negative   Leuk Esterase: Negative / RBC: x / WBC x   Sq Epi: x / Non Sq Epi: x / Bacteria: x          CBC TREND (5 Days)  WBC Count: 4.82 K/uL ( @ 06:00)  WBC Count: 5.88 K/uL ( @ 22:10)    Hemoglobin: 9.0 g/dL ( @ 06:00)  Hemoglobin: 9.8 g/dL ( @ 22:10)    Hematocrit: 27.1 % ( @ 06:00)  Hematocrit: 30.1 % ( @ 22:10)    Platelet Count - Automated: 211 K/uL ( @ 06:00)  Platelet Count - Automated: 244 K/uL ( @ 22:10)    Reticulocyte Percent: 1.2 % (05-18 @ 10:00)      Ferritin, Serum: 72 ng/mL (05-18 @ 10:00)    Iron Total, Serum: 57 ug/dL (05-18 @ 10:00)     Vitamin B12, Serum: >2000 pg/mL (05-18 @ 10:00)     Folate, Serum: 10.2 ng/mL (05-18 @ 10:)     Sedimentation Rate, Erythrocyte: 14 mm/hr (:00)  Sedimentation Rate, Erythrocyte: 97 mm/hr (05-18 @ 10:00)      Serum Protein Electrophoresis Interp: One Beta-Migrating Paraprotein and One Weak Gamma-Migrating Paraprotein  Identified (05-18 @ 10:00)  Serum Protein Electrophoresis Interp: Beta-Migrating Paraprotein Identified ( @ 05:40)        Urine Electrophoresis Interpretation: One Gamma-Migrating Paraprotein and One Weak Gamma-Migrating Paraprotein  Identified (:25)     Immunofixation, Urine:   Reference Range: None Detected ( @ 09:25)    Quantitative Ig mg/dL ( 10:00)  Quantitative IgM: 49 mg/dL (05-18 @ 10:00)  Quantitative IgA: 1140 mg/dL (05-18 @ 10:00)     [place]/Lambda Free Light Chain Ratio, Serum: 0.00 Ratio ( @ 10:00)  [place]/Lambda Free Light Chain Ratio, Serum: 0.00 Ratio ( 10:00)       [MICROBIOLOGY /  VIROLOGY:]  COVID-19 PCR: Aneudytearic (2023 21:55)        [PATHOLOGY]     [RADIOLOGY & ADDITIONAL STUDIES:]

## 2023-07-27 NOTE — ED PROVIDER NOTE - CLINICAL SUMMARY MEDICAL DECISION MAKING FREE TEXT BOX
Patient is an 87-year-old female who presents to the emergency room with a chief complaint of back pain.  Past medical history of multiple myeloma, hypertension, GERD, chronic low back pain, dyspnea on exertion.  Patient has been experiencing lower back pain atraumatic for days significantly worsened today unrelieved with oxycodone.  Pain is worsened with any positional changes.  Denies fevers but does report associated chills and shortness of breath specially when the pain is severe.  Denies any numbness or tingling in the extremities.  No loss of bowel or bladder control.  Denies any chest pain anterior abdominal pain.  No dysuria urinary frequency. Patient presenting to the emergency room with a chief complaint of back pain in the setting of multiple myeloma.  High suspicion for likely compression fracture.  At this time no red flags are noted.  Also consider additional intra-abdominal pathology and renal pathology.  Will obtain screening labs medicate for pain obtain CT imaging of the lumbar spine monitor.  Patient will likely require admission due to extreme pain and inability to ambulate.  Signed out to night attending pending results of labs and imaging.

## 2023-07-27 NOTE — DISCHARGE NOTE PROVIDER - CARE PROVIDER_API CALL
Hon Jaswinder Topete  Medical Oncology  [address], [address]  [place] [address]  Phone: [phone]  Fax: [phone]  Follow Up Time:     Alvin Garcia  Internal Medicine  [address] [place] [address], [address]  [place] [address]  Phone: [phone]  Fax: [phone]  Established Patient  Follow Up Time:    Hon Efrem San  Medical Oncology  [address], [address]  [place] [address]  Phone: [phone]  Fax: [phone]  Follow Up Time:     Alvin Garcia  Internal Medicine  [address] [facility] [address] [facility], [address]  [place] [address]  Phone: [phone]  Fax: [phone]  Established Patient  Follow Up Time:     Filemon Feliciano  Orthopaedic Surgery  [address] [facility], [address] [place] [address]  Phone: [phone]  Fax: [phone]  Follow Up Time:

## 2023-07-27 NOTE — ED ADULT NURSE NOTE - NSFALLRISKINTERV_ED_ALL_ED

## 2023-07-27 NOTE — DISCHARGE NOTE PROVIDER - HOSPITAL COURSE
ADMISSION DATE:  07-27-23    ---  FROM ADMISSION H+P:   HPI:  86yo F with PMHx of MM (on chemo), Hypertension presents to ED c/o low back pain. History is provided by daughter (Katherine) at bedside. Pt started chemo for MM at the end of June with Heme/Onc Dr. San. Pt has chronic low back pain, however on Sunday, pt's low back pain became progressively worse. Today, pt could not stand or walk due to pain so she was brought to ED for management. Pt did not suffer from any inciting falls or trauma.     ED course:  Vitals: HR 75, /71, RR 22, SpO2 98% on RA  Labs: RBC 3.54, Hgb 9.8, Hct 30.1, Na 127, Ca 8.4, Alk phos 121  EKG: pending  CT chest: Expansile lucent/lytic lateral L 6th rib lesion compatible with reported history of multiple myeloma  CT Lumbar spine: Multilevel chronic vertebral body compression deformities at T11, L1, and L4. Superimposed acute or subacute components of the T11 and L1 fractures is not excluded  Given: IV Dilaudid 0.5mg x2, IV Zofran x1, 1L IV NS bolus x1 (27 Jul 2023 05:24)      ---  HOSPITAL COURSE/PERTINENT LABS/PROCEDURES PERFORMED/PENDING TESTS:   Pt was admitted for management of lumbar compression fracture 2/2 multiple myeloma. CT chest revealed expansile lucent/lytic lateral L 6th rib lesion. CT spine revealed acute T11 and L1 fractures, chronic T11, L1 and L4 compression deformities. Ortho was consulted and recommended TLSO bracing ***. MRI spine ***. Skeletal survey ***. Protein electrophoresis *** Heme/Onc was consulted ***. Nephrology was consulted for hyponatremia, recommended limit PO intake.       Patient is stable for discharge as per primary medical team and consultants.    PT consulted, recommends discharge ______    Patient showed improvement throughout hospitalization. Patient was seen and examined on day of discharge. Patient was medically optimized for discharge with close outpatient follow up.    ---  PATIENT CONDITION:  - stable    --  VITALS:   T(C): 36.5 (07-27-23 @ 12:08), Max: 36.8 (07-27-23 @ 08:03)  HR: 68 (07-27-23 @ 12:08) (67 - 78)  BP: 126/53 (07-27-23 @ 12:08) (126/53 - 141/74)  RR: 20 (07-27-23 @ 12:08) (16 - 22)  SpO2: 94% (07-27-23 @ 12:08) (94% - 98%)    PHYSICAL EXAM ON DAY OF DISCHARGE:    ---  CONSULTANTS:   - ** (Ortho)  -Dr. Brandt (Nephro)  -Dr. San (Heme/onc)    ---  ADVANCED CARE PLANNING:  - Code status:      - MOLST completed:      [  ] NO     [  ] YES    ---  TIME SPENT:  I, the attending physician, was physically present for the key portions of the evaluation and management (E/M) service provided. The total amount of time spent reviewing the hospital notes, laboratory values, imaging findings, assessing/counseling the patient, discussing with consultant physicians, social work, nursing staff was -- minutes       ADMISSION DATE:  07-27-23    ---  FROM ADMISSION H+P:   HPI:  88yo F with PMHx of MM (on chemo), Hypertension presents to ED c/o low back pain. History is provided by daughter (Katherine) at bedside. Pt started chemo for MM at the end of June with Heme/Onc Dr. San. Pt has chronic low back pain, however on Sunday, pt's low back pain became progressively worse. Today, pt could not stand or walk due to pain so she was brought to ED for management. Pt did not suffer from any inciting falls or trauma.     ED course:  Vitals: HR 75, /71, RR 22, SpO2 98% on RA  Labs: RBC 3.54, Hgb 9.8, Hct 30.1, Na 127, Ca 8.4, Alk phos 121  EKG: pending  CT chest: Expansile lucent/lytic lateral L 6th rib lesion compatible with reported history of multiple myeloma  CT Lumbar spine: Multilevel chronic vertebral body compression deformities at T11, L1, and L4. Superimposed acute or subacute components of the T11 and L1 fractures is not excluded  Given: IV Dilaudid 0.5mg x2, IV Zofran x1, 1L IV NS bolus x1 (27 Jul 2023 05:24)      ---  HOSPITAL COURSE/PERTINENT LABS/PROCEDURES PERFORMED/PENDING TESTS:   Pt was admitted for management of lumbar compression fracture 2/2 multiple myeloma. CT chest revealed expansile lucent/lytic lateral L 6th rib lesion. CT spine revealed acute T11 and L1 fractures, chronic T11, L1 and L4 compression deformities. Ortho was consulted and recommended TLSO bracing, Pt already had one at home. MRI spine revealed chronic compression deformities and nonspecific changes, no cord compression. Skeletal survey showed expansile lytic lesion left sixth rib and severe chronic compression. Protein electrophoresis studies still pending  Heme/Onc was consulted for her Hx of Multiple Myeloma, Revlimid was held during her hospital stay. Nephrology was consulted for hyponatremia, recommended limit PO fluid intake. Pt was medically optimized and ready for discharge.       Patient is stable for discharge as per primary medical team and consultants.    PT consulted, recommends discharge home PT.    Patient showed improvement throughout hospitalization. Patient was seen and examined on day of discharge. Patient was medically optimized for discharge with close outpatient follow up.    ---  PATIENT CONDITION:  - stable    --  VITALS:   T(C): 36.4 (07-28-23 @ 05:22), Max: 36.9 (07-27-23 @ 20:23)  HR: 78 (07-28-23 @ 08:53) (68 - 90)  BP: 178/71 (07-28-23 @ 09:43) (122/71 - 178/71)  RR: 18 (07-28-23 @ 08:53) (18 - 20)  SpO2: 95% (07-28-23 @ 08:53) (93% - 95%)    ---  CONSULTANTS:   -Dr. Feliciano (Ortho)  -Dr. Brandt (Nephro)  -Dr. San (Heme/onc)    ---  ADVANCED CARE PLANNING:  - Code status: Full Code  - MOLST completed:      [ x ] NO     [  ] YES    ---  TIME SPENT:  I, the attending physician, was physically present for the key portions of the evaluation and management (E/M) service provided. The total amount of time spent reviewing the hospital notes, laboratory values, imaging findings, assessing/counseling the patient, discussing with consultant physicians, social work, nursing staff was -- minutes       ADMISSION DATE:  07-27-23    ---  FROM ADMISSION H+P:   HPI:  86yo F with PMHx of MM (on chemo), Hypertension presents to ED c/o low back pain. History is provided by daughter (Katherine) at bedside. Pt started chemo for MM at the end of June with Heme/Onc Dr. San. Pt has chronic low back pain, however on Sunday, pt's low back pain became progressively worse. Today, pt could not stand or walk due to pain so she was brought to ED for management. Pt did not suffer from any inciting falls or trauma.     ED course:  Vitals: HR 75, /71, RR 22, SpO2 98% on RA  Labs: RBC 3.54, Hgb 9.8, Hct 30.1, Na 127, Ca 8.4, Alk phos 121  EKG: pending  CT chest: Expansile lucent/lytic lateral L 6th rib lesion compatible with reported history of multiple myeloma  CT Lumbar spine: Multilevel chronic vertebral body compression deformities at T11, L1, and L4. Superimposed acute or subacute components of the T11 and L1 fractures is not excluded  Given: IV Dilaudid 0.5mg x2, IV Zofran x1, 1L IV NS bolus x1 (27 Jul 2023 05:24)      ---  HOSPITAL COURSE/PERTINENT LABS/PROCEDURES PERFORMED/PENDING TESTS:   Pt was admitted for management of lumbar compression fracture 2/2 multiple myeloma. CT chest revealed expansile lucent/lytic lateral L 6th rib lesion. CT spine revealed acute T11 and L1 fractures, chronic T11, L1 and L4 compression deformities. Ortho was consulted and recommended TLSO bracing, Pt already had one at home. MRI spine revealed chronic compression deformities and nonspecific changes, no cord compression. Skeletal survey showed expansile lytic lesion left sixth rib and severe chronic compression. Protein electrophoresis studies still pending  Heme/Onc- Dr San  was consulted for her Hx of Multiple Myeloma, Revlimid was held during her hospital stay. Nephrology Dr Brandt was consulted for hyponatremia,  2/2 SIADH recommended limit PO Water  intake, 2 tab of Na Cl tabs ,  Pt was medically optimized and ready for discharge. HCPT arranged.  MRI T/L spine :  Marked compression deformities of T2 and L1 are nonspecific.   Superior endplate compression deformities T11,T12 and L4 have a benign   type appearance. Mild bony retropulsion at T2 without cord compression.   No abnormal intraspinal enhancement.    Orthopedic :  -Skeletal survey did not show any obvious mets to long bones or pelvis  -Neuroaxial MRI did not show epidural extension of mets. Mainly noted similar compression fractures from prior scans.  -WBAT  -TLSO brace ordered; recommended to wear while out of bed for comfort  -PT/OT      Patient is stable for discharge as per primary medical team and consultants.  PT consulted, recommends discharge home PT.HCPT    Patient showed improvement throughout hospitalization. Patient was seen and examined on day of discharge. Patient was medically optimized for discharge with close outpatient follow up.    ---  PATIENT CONDITION:  - stable    --  VITALS:   T(C): 36.4 (07-28-23 @ 05:22), Max: 36.9 (07-27-23 @ 20:23)  HR: 78 (07-28-23 @ 08:53) (68 - 90)  BP: 178/71 (07-28-23 @ 09:43) (122/71 - 178/71)  RR: 18 (07-28-23 @ 08:53) (18 - 20)  SpO2: 95% (07-28-23 @ 08:53) (93% - 95%)    ---  CONSULTANTS:   -Dr. Feliciano (Ortho)  -Dr. Brandt (Nephro)  -Dr. San (Heme/onc)    ---  ADVANCED CARE PLANNING:  - Code status: Full Code  - MOLST completed:      [ x ] NO     [  ] YES    ---  TIME SPENT:  I, the attending physician, was physically present for the key portions of the evaluation and management (E/M) service provided. The total amount of time spent reviewing the hospital notes, laboratory values, imaging findings, assessing/counseling the patient, discussing with consultant physicians, social work, nursing staff was 60 minutes

## 2023-07-27 NOTE — CAREGIVER ENGAGEMENT NOTE - CAREGIVER PHONE NUMBER
--per patient, has known about cirrhosis for >10 years. 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

## 2023-07-27 NOTE — CARE COORDINATION ASSESSMENT. - OTHER PERTINENT REFERRAL INFORMATION
Contacted dtr via phone. Role of CM explained w verbalized understanding. Dtr states pt lives w her in a PH w/o stairs. She has a RW and a commode. Dtr works from home and is available to assist as needed . No HCS, but dtr is agreeable to explained [facility] services if recommend by PT. DC needs pending PTE/hospital course. Dtr to transport when DC ready. CM to follow

## 2023-07-27 NOTE — PROGRESS NOTE ADULT - PROBLEM SELECTOR PLAN 4
- Chronic per chart review  - Na 127 on admission, baseline ~Na 129-130  - s/p 1L IV NS bolus in ED   - Monitor BMP - Chronic per chart review  - Na 127 on admission, baseline ~Na 129-130  - s/p 1L IV NS bolus in ED   - Monitor BMP  - water restriction  - consulted Nephro (Dr. 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 Brandt), f/u recs

## 2023-07-27 NOTE — H&P ADULT - MUSCULOSKELETAL
back exam Back pain Lower/no joint swelling/no joint erythema/no calf tenderness/decreased ROM due to pain/back exam negative

## 2023-07-27 NOTE — DISCHARGE NOTE PROVIDER - NSDCFUSCHEDAPPT_GEN_ALL_CORE_FT
[facility]  DIAGRAD [address]  Scheduled Appointment: 07/29/2023    [facility]  DIAGRAD [address]  Scheduled Appointment: 07/29/2023    [facility]  MRI [address]  Scheduled Appointment: 08/04/2023    [facility]  MRI [address]  Scheduled Appointment: 08/04/2023

## 2023-07-27 NOTE — DISCHARGE NOTE PROVIDER - NSDCMRMEDTOKEN_GEN_ALL_CORE_FT
acyclovir 400 mg oral tablet: 1 orally 2 times a day  dexAMETHasone 4 mg oral tablet: 5 tab(s) orally once a day Every Monday with chemo  Lenalidomide 25 mg oral capsule: 1 orally once a day  losartan 25 mg oral tablet: 0.5 tab(s) orally once a day  morphine 30 mg/24 hours oral capsule, extended release: 1 orally 2 times a day as needed for  severe pain  Percocet 5 mg-325 mg oral tablet: 1 orally 2 times a day as needed for  severe pain   acyclovir 400 mg oral tablet: 1 orally 2 times a day  dexAMETHasone 4 mg oral tablet: 5 tab(s) orally once a day Every Monday with chemo  Lenalidomide 25 mg oral capsule: 1 orally once a day  losartan 25 mg oral tablet: 0.5 tab(s) orally once a day  morphine 30 mg/24 hours oral capsule, extended release: 1 orally 2 times a day as needed for  severe pain  Percocet 5 mg-325 mg oral tablet: 1 orally 2 times a day as needed for  severe pain  TLSO Brace, R26.2: Wear while out of bed or for comfort MDD: 1   acyclovir 400 mg oral tablet: 1 orally 2 times a day  dexAMETHasone 4 mg oral tablet: 5 tab(s) orally once a day Every Monday with chemo  Lenalidomide 25 mg oral capsule: 1 orally once a day  losartan 25 mg oral tablet: 0.5 tab(s) orally once a day  morphine 30 mg/24 hours oral capsule, extended release: 1 orally 2 times a day as needed for  severe pain  Percocet 5 mg-325 mg oral tablet: 1 orally 2 times a day as needed for  severe pain  polyethylene glycol 3350 oral powder for reconstitution: 17 gram(s) orally once a day  senna leaf extract oral tablet: 2 tab(s) orally once a day (at bedtime)  BENJI Rangel, R26.2: Wear while out of bed or for comfort MDD: 1

## 2023-07-27 NOTE — H&P ADULT - PROBLEM SELECTOR PLAN 4
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

## 2023-07-27 NOTE — CARE COORDINATION ASSESSMENT. - NSCAREPROVIDERS_GEN_ALL_CORE_FT
CARE PROVIDERS:  Accepting Physician: Omega Sandhu  Administration: Castillo Monterroso  Administration: Mimi Bourgeois  Admitting: Omega Sandhu  Attending: Omega Sandhu  Consultant: Hakeem Aponte  Covering Nurse: Luke Ash  Covering Team: Ibis Carmona  ED Attending: Araceli Ness  ED Nurse: Veena Benton  Emergency Medicine: Keaton Moreau  Infection Control: Jodie Turner  Nurse: Katherin Diallo  Nurse: Carmen Pedersen  Oncology: Hon Jaswinder  Ordered: ADM, User  Outpatient Provider: Uriah Tripathi  Outpatient Provider: Mikhail Brandt  Outpatient Provider: Jossue Swanson  Override: Carmen Pedersen  PCA/Nursing Assistant: Hill Modi  Primary Team: Almas Ritter  Primary Team: Tuyet Guaman  Primary Team: Vivian Sandhu  Respiratory Therapy: Tyron Radford  UR// Supp. Assoc.: Jyothi Jones  UR// Supp. Assoc.: Shelley Shin

## 2023-07-27 NOTE — PROGRESS NOTE ADULT - PROBLEM SELECTOR PLAN 2
- Continue home Acyclovir, Lenalidomide  - Follows with Heme/Onc Dr. San  - Heme/Onc (Dr. San) consulted, f/u recs - As per daughter, Pt is on lenalidomide but takes it for 21 days and is off 7 days. She is currently in her off cycle. Instructed daughter to let us know when to resume. She will check the calendar at home and let us know at a later time.   - Hold lenalidomide as stated above.  - Continue home Acyclovir  - ESR wnl  - f/u CRP, UPEP, SPEP, LDH to eval for possible metastasis or epidural tumor   - Heme/Onc (Dr. San) consulted, f/u recs

## 2023-07-27 NOTE — ED PROVIDER NOTE - OBJECTIVE STATEMENT
Patient is an 87-year-old female who presents to the emergency room with a chief complaint of back pain.  Past medical history of multiple myeloma, hypertension, GERD, chronic low back pain, dyspnea on exertion.  Patient has been experiencing lower back pain atraumatic for days significantly worsened today unrelieved with oxycodone.  Pain is worsened with any positional changes.  Denies fevers but does report associated chills and shortness of breath specially when the pain is severe.  Denies any numbness or tingling in the extremities.  No loss of bowel or bladder control.  Denies any chest pain anterior abdominal pain.  No dysuria urinary frequency.

## 2023-07-27 NOTE — CONSULT NOTE ADULT - CONSULT REASON
Consulted 0630  Pt seen 0800  Problem: LBP, VCF
Hyponatremia
M84. 48XA.  Pathologic fracture left lateral  sixth rib. Multiple vertebral body compression fractures.  D63.8 Anemia due to chronic disease  E87.1  Hyponatremia  I10  Hypertension  S22.000A  Back pain, LS spine compression fracture  C90.0   MM  D50.0 Iron deficiency anemia

## 2023-07-27 NOTE — H&P ADULT - NSHPSOCIALHISTORY_GEN_ALL_CORE
Tobacco: denies  EtOH: denies   Recreational drug use: denies  Lives with: daughter  Ambulates: with a walker  ADLs: with assistance

## 2023-07-27 NOTE — ED ADULT NURSE NOTE - NSFALLASSESSNEED_ED_ALL_ED
Pt called, informed pt of testing results. Pt states he is not better but not worse. Pt asking if there are any antibiotics or anything to help with the cough, cough worse at night, can't sleep. pharmacy  [facility] [address] verified. yes

## 2023-07-28 ENCOUNTER — TRANSCRIPTION ENCOUNTER (OUTPATIENT)
Age: 88
End: 2023-07-28

## 2023-07-28 VITALS
HEART RATE: 90 BPM | TEMPERATURE: 98 F | OXYGEN SATURATION: 97 % | SYSTOLIC BLOOD PRESSURE: 110 MMHG | DIASTOLIC BLOOD PRESSURE: 70 MMHG | RESPIRATION RATE: 20 BRPM

## 2023-07-28 LAB
ALBUMIN SERPL ELPH-MCNC: 3.1 G/DL — LOW (ref 3.3–5)
ALP SERPL-CCNC: 106 U/L — SIGNIFICANT CHANGE UP (ref 40–120)
ALT FLD-CCNC: 32 U/L — SIGNIFICANT CHANGE UP (ref 12–78)
ANION GAP SERPL CALC-SCNC: 7 MMOL/L — SIGNIFICANT CHANGE UP (ref 5–17)
AST SERPL-CCNC: 20 U/L — SIGNIFICANT CHANGE UP (ref 15–37)
BASOPHILS # BLD AUTO: 0.07 K/UL — SIGNIFICANT CHANGE UP (ref 0–0.2)
BASOPHILS NFR BLD AUTO: 1.8 % — SIGNIFICANT CHANGE UP (ref 0–2)
BILIRUB SERPL-MCNC: 0.3 MG/DL — SIGNIFICANT CHANGE UP (ref 0.2–1.2)
BUN SERPL-MCNC: 9 MG/DL — SIGNIFICANT CHANGE UP (ref 7–23)
CALCIUM SERPL-MCNC: 8.5 MG/DL — SIGNIFICANT CHANGE UP (ref 8.5–10.1)
CHLORIDE SERPL-SCNC: 101 MMOL/L — SIGNIFICANT CHANGE UP (ref 96–108)
CO2 SERPL-SCNC: 24 MMOL/L — SIGNIFICANT CHANGE UP (ref 22–31)
CREAT SERPL-MCNC: 0.45 MG/DL — LOW (ref 0.5–1.3)
EGFR: 94 ML/MIN/1.73M2 — SIGNIFICANT CHANGE UP
EOSINOPHIL # BLD AUTO: 0.17 K/UL — SIGNIFICANT CHANGE UP (ref 0–0.5)
EOSINOPHIL NFR BLD AUTO: 4.4 % — SIGNIFICANT CHANGE UP (ref 0–6)
GLUCOSE SERPL-MCNC: 107 MG/DL — HIGH (ref 70–99)
HCT VFR BLD CALC: 30.5 % — LOW (ref 34.5–45)
HGB BLD-MCNC: 10.1 G/DL — LOW (ref 11.5–15.5)
IMM GRANULOCYTES NFR BLD AUTO: 0.8 % — SIGNIFICANT CHANGE UP (ref 0–0.9)
LYMPHOCYTES # BLD AUTO: 0.88 K/UL — LOW (ref 1–3.3)
LYMPHOCYTES # BLD AUTO: 22.7 % — SIGNIFICANT CHANGE UP (ref 13–44)
MAGNESIUM SERPL-MCNC: 2.1 MG/DL — SIGNIFICANT CHANGE UP (ref 1.6–2.6)
MCHC RBC-ENTMCNC: 27.8 PG — SIGNIFICANT CHANGE UP (ref 27–34)
MCHC RBC-ENTMCNC: 33.1 GM/DL — SIGNIFICANT CHANGE UP (ref 32–36)
MCV RBC AUTO: 84 FL — SIGNIFICANT CHANGE UP (ref 80–100)
MONOCYTES # BLD AUTO: 0.67 K/UL — SIGNIFICANT CHANGE UP (ref 0–0.9)
MONOCYTES NFR BLD AUTO: 17.3 % — HIGH (ref 2–14)
NEUTROPHILS # BLD AUTO: 2.05 K/UL — SIGNIFICANT CHANGE UP (ref 1.8–7.4)
NEUTROPHILS NFR BLD AUTO: 53 % — SIGNIFICANT CHANGE UP (ref 43–77)
NRBC # BLD: 0 /100 WBCS — SIGNIFICANT CHANGE UP (ref 0–0)
OSMOLALITY UR: 483 MOSM/KG — SIGNIFICANT CHANGE UP (ref 50–1200)
PHOSPHATE SERPL-MCNC: 2.8 MG/DL — SIGNIFICANT CHANGE UP (ref 2.5–4.5)
PLATELET # BLD AUTO: 251 K/UL — SIGNIFICANT CHANGE UP (ref 150–400)
POTASSIUM SERPL-MCNC: 3.8 MMOL/L — SIGNIFICANT CHANGE UP (ref 3.5–5.3)
POTASSIUM SERPL-SCNC: 3.8 MMOL/L — SIGNIFICANT CHANGE UP (ref 3.5–5.3)
PROT SERPL-MCNC: 6 G/DL — SIGNIFICANT CHANGE UP (ref 6–8.3)
PROT SERPL-MCNC: 6 G/DL — SIGNIFICANT CHANGE UP (ref 6–8.3)
PROT SERPL-MCNC: 6.3 G/DL — SIGNIFICANT CHANGE UP (ref 6–8.3)
RBC # BLD: 3.63 M/UL — LOW (ref 3.8–5.2)
RBC # FLD: 16.2 % — HIGH (ref 10.3–14.5)
SODIUM SERPL-SCNC: 132 MMOL/L — LOW (ref 135–145)
SODIUM UR-SCNC: 59 MMOL/L — SIGNIFICANT CHANGE UP
URATE SERPL-MCNC: 3 MG/DL — SIGNIFICANT CHANGE UP (ref 2.5–7)
WBC # BLD: 3.87 K/UL — SIGNIFICANT CHANGE UP (ref 3.8–10.5)
WBC # FLD AUTO: 3.87 K/UL — SIGNIFICANT CHANGE UP (ref 3.8–10.5)

## 2023-07-28 PROCEDURE — 84165 PROTEIN E-PHORESIS SERUM: CPT

## 2023-07-28 PROCEDURE — 99285 EMERGENCY DEPT VISIT HI MDM: CPT

## 2023-07-28 PROCEDURE — 96375 TX/PRO/DX INJ NEW DRUG ADDON: CPT

## 2023-07-28 PROCEDURE — 36415 COLL VENOUS BLD VENIPUNCTURE: CPT

## 2023-07-28 PROCEDURE — A9579: CPT

## 2023-07-28 PROCEDURE — 81003 URINALYSIS AUTO W/O SCOPE: CPT

## 2023-07-28 PROCEDURE — 72100 X-RAY EXAM L-S SPINE 2/3 VWS: CPT

## 2023-07-28 PROCEDURE — 85025 COMPLETE CBC W/AUTO DIFF WBC: CPT

## 2023-07-28 PROCEDURE — 77075 RADEX OSSEOUS SURVEY COMPL: CPT

## 2023-07-28 PROCEDURE — 83735 ASSAY OF MAGNESIUM: CPT

## 2023-07-28 PROCEDURE — 87086 URINE CULTURE/COLONY COUNT: CPT

## 2023-07-28 PROCEDURE — 97162 PT EVAL MOD COMPLEX 30 MIN: CPT

## 2023-07-28 PROCEDURE — 85652 RBC SED RATE AUTOMATED: CPT

## 2023-07-28 PROCEDURE — 97165 OT EVAL LOW COMPLEX 30 MIN: CPT

## 2023-07-28 PROCEDURE — 83935 ASSAY OF URINE OSMOLALITY: CPT

## 2023-07-28 PROCEDURE — 83690 ASSAY OF LIPASE: CPT

## 2023-07-28 PROCEDURE — 93005 ELECTROCARDIOGRAM TRACING: CPT

## 2023-07-28 PROCEDURE — 72158 MRI LUMBAR SPINE W/O & W/DYE: CPT

## 2023-07-28 PROCEDURE — 72131 CT LUMBAR SPINE W/O DYE: CPT | Mod: MA

## 2023-07-28 PROCEDURE — 84300 ASSAY OF URINE SODIUM: CPT

## 2023-07-28 PROCEDURE — 84100 ASSAY OF PHOSPHORUS: CPT

## 2023-07-28 PROCEDURE — 71275 CT ANGIOGRAPHY CHEST: CPT | Mod: MA

## 2023-07-28 PROCEDURE — 84550 ASSAY OF BLOOD/URIC ACID: CPT

## 2023-07-28 PROCEDURE — 80053 COMPREHEN METABOLIC PANEL: CPT

## 2023-07-28 PROCEDURE — 72157 MRI CHEST SPINE W/O & W/DYE: CPT

## 2023-07-28 PROCEDURE — 96374 THER/PROPH/DIAG INJ IV PUSH: CPT

## 2023-07-28 PROCEDURE — 86140 C-REACTIVE PROTEIN: CPT

## 2023-07-28 PROCEDURE — 74177 CT ABD & PELVIS W/CONTRAST: CPT | Mod: MA

## 2023-07-28 PROCEDURE — 83615 LACTATE (LD) (LDH) ENZYME: CPT

## 2023-07-28 PROCEDURE — 72156 MRI NECK SPINE W/O & W/DYE: CPT

## 2023-07-28 PROCEDURE — 84484 ASSAY OF TROPONIN QUANT: CPT

## 2023-07-28 PROCEDURE — 84155 ASSAY OF PROTEIN SERUM: CPT

## 2023-07-28 PROCEDURE — 96376 TX/PRO/DX INJ SAME DRUG ADON: CPT

## 2023-07-28 RX ORDER — POLYETHYLENE GLYCOL 3350 17 G/17G
17 POWDER, FOR SOLUTION ORAL
Qty: 0 | Refills: 0 | DISCHARGE
Start: 2023-07-28

## 2023-07-28 RX ORDER — SENNA PLUS 8.6 MG/1
2 TABLET ORAL
Qty: 0 | Refills: 0 | DISCHARGE
Start: 2023-07-28

## 2023-07-28 RX ADMIN — HYDROMORPHONE HYDROCHLORIDE 0.25 MILLIGRAM(S): 2 INJECTION INTRAMUSCULAR; INTRAVENOUS; SUBCUTANEOUS at 16:27

## 2023-07-28 RX ADMIN — ENOXAPARIN SODIUM 40 MILLIGRAM(S): 100 INJECTION SUBCUTANEOUS at 17:43

## 2023-07-28 RX ADMIN — HYDROMORPHONE HYDROCHLORIDE 0.5 MILLIGRAM(S): 2 INJECTION INTRAMUSCULAR; INTRAVENOUS; SUBCUTANEOUS at 09:21

## 2023-07-28 RX ADMIN — Medication 400 MILLIGRAM(S): at 17:41

## 2023-07-28 RX ADMIN — LOSARTAN POTASSIUM 12.5 MILLIGRAM(S): 100 TABLET, FILM COATED ORAL at 05:41

## 2023-07-28 RX ADMIN — HYDROMORPHONE HYDROCHLORIDE 0.25 MILLIGRAM(S): 2 INJECTION INTRAMUSCULAR; INTRAVENOUS; SUBCUTANEOUS at 16:51

## 2023-07-28 RX ADMIN — Medication 400 MILLIGRAM(S): at 05:41

## 2023-07-28 RX ADMIN — POLYETHYLENE GLYCOL 3350 17 GRAM(S): 17 POWDER, FOR SOLUTION ORAL at 11:48

## 2023-07-28 RX ADMIN — HYDROMORPHONE HYDROCHLORIDE 0.5 MILLIGRAM(S): 2 INJECTION INTRAMUSCULAR; INTRAVENOUS; SUBCUTANEOUS at 08:55

## 2023-07-28 NOTE — PHYSICAL THERAPY INITIAL EVALUATION ADULT - PERTINENT HX OF CURRENT PROBLEM, REHAB EVAL
Patient is 87 year old female PMHx of MM (on chemo), Hypertension presents to ED c/o low back pain. Pt started chemo for MM at the end of June with Heme/Onc Dr. San. Pt has chronic low back pain, however on Sunday, pt's low back pain became progressively worse. Today, pt could not stand or walk due to pain so she was brought to ED for management. Pt did not suffer from any inciting falls or trauma. CT: Multilevel chronic vertebral body compression deformities at T11, L1, and L4.

## 2023-07-28 NOTE — PHYSICAL THERAPY INITIAL EVALUATION ADULT - ADDITIONAL COMMENTS
Patient lives with her daughter in a house with a few steps to enter, no steps inside. Pt's daughter works, however, as per EMR, daughter stated she can assist pt as needed. Pt ambulated with a rolling walker. Pt owns a commode and a stall shower with curtains. Pt reports her daughter assists with ADLs as needed.

## 2023-07-28 NOTE — CASE MANAGEMENT PROGRESS NOTE - NSCMPROGRESSNOTE_GEN_ALL_CORE
Patient recommended for PT/ OT. The patient is interested in [facility] for SN/ PT/ OT by [facility]. The referral started pend DC readiness. CM will continue to monitor.

## 2023-07-28 NOTE — DISCHARGE NOTE NURSING/CASE MANAGEMENT/SOCIAL WORK - NSDCPEFALRISK_GEN_ALL_CORE
For information on Fall & Injury Prevention, visit: [email or web address] OR  [email or web address] OR  https://www.cdc.gov/steadi/patient.html

## 2023-07-28 NOTE — PROGRESS NOTE ADULT - PROBLEM SELECTOR PLAN 4
- Chronic -2/2 Likely SIADH   - Na 127 on admission, baseline ~Na 129-130  - s/p 1L IV NS bolus in ED   - continue Free water restriction 1 lit/24 hrs   - consulted Nephro (Dr. Brandt), f/u recs - Chronic -2/2 Likely SIADH 2/2 pain  - Na 127 on admission, baseline ~Na 129-130  - s/p 1L IV NS bolus in ED   - continue Free water restriction 1 lit/24 hrs   - Nephro (Dr. Brandt), f/u recs-S/P 2 tab of Nacl  BMP as out pt

## 2023-07-28 NOTE — PROGRESS NOTE ADULT - ASSESSMENT
Hyponatremia; SIADH  Anemia, Multiple Myeloma  Hypertension  Back pain, LS spine compression fracture    07/27/23: Check Lynette, Uosm. PO fluid restriction. Sodium levels improving. Avoid excessive PO fluids. Monitor BP trend. Titrate BP meds as needed.  Stable renal indices. Hematology evaluation. D/w patients family at bedside. Will follow electrolytes and renal function trend.   07/28/23: Labs pending. Will follow sodium levels. Avoid hypotonic fluids. PO fluid restriction. 
[ASSESSMENT and  PLAN]  M84. 48XA.  Pathologic fracture left lateral  sixth rib. Multiple vertebral body compression fractures.  D63.8 Anemia due to chronic disease  E87.1  Hyponatremia  I10  Hypertension  S22.000A  Back pain, LS spine compression fracture  C90.0   MM  D50.0 Iron deficiency anemia    Patient is a 87y old  Female who presents with a chief complaint of back pain     Pt had admission in May with similar pain. At time dx with compression fractures, osteoporosis and Multiple myeloma, hyponatremia.   Pt developed hypercalcemia due to malignancy shortly after DC, treated with Zometa IV.     At time imaging showed   Imaging showed Closed compression fracture of lumbosacral spine.   Pathologic fracture left lateral  sixth rib.   Multiple vertebral body compression fractures. T2, T11, and L1, L4,.   Severe compression fx T2, Mild T11 superior endplate compression deformity with faint fracture line  Severe L1 compression fracture, moderate-severe L4 compression fracture    Myeloma Workup  Work-up with marked elevated STEPHANE kappa of 188.2.  IgG decreased 336, IgA increased at 1140, IgM 49.  IgA-lambda monoclonal protein and likely multiple myeloma    Pt started tx wih Darzalex, Revlimid, Dex. Last dose given Tues.     Fe def  Prior Ferritin 72 inappropriate low given elevated ESR and CRP.      Acute T12 compression fx as cause of pain  sx started Sun. Abated Tues. Recommended outpt resume TLSO brace use more frequently and avoid any wt loadng  Outpt imaging planned; family advised to come to ED if sx worse as concern for new fx.   May need pain control.  Came to ED Wed AM    Osteoporosis      RECOMMENDATIONS    -IgA lambda myeloma on darzalex, revlimid , decadron last dose 7/25, w known painful spinal compression fx, on a brace as coutpatient, T12, and hx hypercalcemia. Adm w increased back pain    -back pain improved, now back on brace, started on Dilaudid 0.5mg 0.25mg prn  -anemia-due to chronic ds, myeloma, chemo-Hgb adequate, today incr to 10, no acute intervention needed  -myeloma treatment-was instructed to hold Revlimid during acute illness/hosptial admission. will reassess after discharge    discussed w pt  
86yo F with PMHx of MM (on chemo), Hypertension presents to ED c/o low back pain, admitted for lumbar compression fracture 2/2 MM.
86yo F with PMHx of MM (on chemo), Hypertension presents to ED c/o low back pain, admitted for lumbar compression fracture 2/2 MM.

## 2023-07-28 NOTE — PROGRESS NOTE ADULT - SUBJECTIVE AND OBJECTIVE BOX
All interim records and events noted.    back pain improved, now back in brace as well  alert up in chair      MEDICATIONS  (STANDING):  acyclovir   Oral Tab/Cap 400 milliGRAM(s) Oral two times a day  enoxaparin Injectable 40 milliGRAM(s) SubCutaneous every 24 hours  losartan 12.5 milliGRAM(s) Oral daily  polyethylene glycol 3350 17 Gram(s) Oral daily  senna 2 Tablet(s) Oral at bedtime    MEDICATIONS  (PRN):  HYDROmorphone  Injectable 0.5 milliGRAM(s) IV Push every 6 hours PRN Severe Pain (7 - 10)  HYDROmorphone  Injectable 0.25 milliGRAM(s) IV Push every 6 hours PRN Moderate Pain (4 - 6)  melatonin 3 milliGRAM(s) Oral at bedtime PRN Insomnia  ondansetron Injectable 4 milliGRAM(s) IV Push every 8 hours PRN Nausea and/or Vomiting      Vital Signs Last 24 Hrs  T(C): 36.4 (28 Jul 2023 05:22), Max: 36.9 (27 Jul 2023 20:23)  T(F): 97.6 (28 Jul 2023 05:22), Max: 98.4 (27 Jul 2023 20:23)  HR: 78 (28 Jul 2023 08:53) (68 - 90)  BP: 178/71 (28 Jul 2023 09:43) (122/71 - 178/71)  BP(mean): --  RR: 18 (28 Jul 2023 08:53) (18 - 20)  SpO2: 95% (28 Jul 2023 08:53) (93% - 95%)    Parameters below as of 28 Jul 2023 08:53  Patient On (Oxygen Delivery Method): room air        PHYSICAL EXAM  General:, in no acute distress  Head: atraumatic, normocephalic  ENT: sclera anicteric, buccal mucosa moist  Neck: supple, trachea midline  CV: S1 S2, regular rate and rhythm  Lungs: clear to auscultation, no wheezes/rhonchi  Abdomen: soft, nontender, bowel sounds present, no palpable masses. Pouchy  Extrem: no clubbing/cyanosis/edema  Skin: no significant increased ecchymosis/petechiae  Neuro: alert and oriented X3,  no focal deficits      LABS:             10.1   3.87  )-----------( 251      ( 07-28 @ 07:56 )             30.5                9.0    4.82  )-----------( 211      ( 07-27 @ 06:00 )             27.1                9.8    5.88  )-----------( 244      ( 07-26 @ 22:10 )             30.1       07-27    129<L>  |  98  |  16  ----------------------------<  100<H>  4.7   |  26  |  0.69    Ca    8.0<L>      27 Jul 2023 06:00    TPro  6.0  /  Alb  x   /  TBili  x   /  DBili  x   /  AST  x   /  ALT  x   /  AlkPhos  x   07-27        RADIOLOGY & ADDITIONAL STUDIES:    IMPRESSION/RECOMMENDATIONS:

## 2023-07-28 NOTE — PROGRESS NOTE ADULT - PROBLEM SELECTOR PLAN 2
- As per daughter, Pt is on lenalidomide but takes it for 21 days and is off 7 days. She is currently in her off cycle. Instructed daughter to let us know when to resume. She will check the calendar at home and let us know at a later time.   - Hold lenalidomide as stated above.  - Continue home Acyclovir  - ESR wnl, CRP wnl,   - f/u UPEP, SPEP to eval for possible metastasis or epidural tumor   - Heme/Onc (Dr. San) consulted, f/u recs - As per daughter, Pt is on lenalidomide but takes it for 21 days and is off 7 days. She is currently in her off cycle. Instructed daughter to let us know when to resume. She will check the calendar at home and let us know at a later time.   -Restart Chemo with Dr San next week   - Hold lenalidomide as stated above.  - Continue home Acyclovir 2x day   - ESR wnl, CRP wnl,   - f/u UPEP, SPEP to eval for possible metastasis or epidural tumor   - Heme/Onc (Dr. San) -d/w continue Pain meds Percocet  & Morphine ER sent by Dr San this week

## 2023-07-28 NOTE — PHYSICAL THERAPY INITIAL EVALUATION ADULT - GAIT TRAINING, PT EVAL
Patient will ambulate 100 feet with supervision with rolling walker by 2 weeks to allow for increased independence in the community.

## 2023-07-28 NOTE — DISCHARGE NOTE NURSING/CASE MANAGEMENT/SOCIAL WORK - PATIENT PORTAL LINK FT
You can access the FollowMyHealth Patient Portal offered by [facility] by registering at the following website: [email or web address]. By joining [employer]’s FollowMyHealth portal, you will also be able to view your health information using other applications (apps) compatible with our system.

## 2023-07-28 NOTE — PATIENT CHOICE NOTE. - NSPTCHOICESTATE_GEN_ALL_CORE

## 2023-07-28 NOTE — CHART NOTE - NSCHARTNOTEFT_GEN_A_CORE
Pt seen at bedside to fit and deliver tlso to be used oob daily.  Pt and daughter instructed to yolie and lupe  Written instructions provided      [place] [facility]  [phone]

## 2023-07-28 NOTE — PHYSICAL THERAPY INITIAL EVALUATION ADULT - BED MOBILITY TRAINING, PT EVAL
Patient will perform supine<->sit with supervision by 2 weeks to allow patient to get in/out of bed safely.

## 2023-07-28 NOTE — PROGRESS NOTE ADULT - ATTENDING COMMENTS
88yo F with PMHx of MM (on chemo), Hypertension presents to ED c/o low back pain, admitted for lumbar compression fracture likely 2/2 MM.  Pt seen, Examined, case & care plan d/w pt, residents at detail.  AM labs   Consults:  H/O DR San/Dr Flores  d/w at detail -Ok for D/C with out pt follow up  Orthopedics eval done, Medical management ,WBAT,  - D/W Dtr --> Add Bowel regimen with Pain meds   PT Eval---> HCPT   PO diet  DVT PPX   D/C Home today with HCPT  D/W Family -Dtrs at detail.  Total care time is 60 minutes,
86yo F with PMHx of MM (on chemo), Hypertension presents to ED c/o low back pain, admitted for lumbar compression fracture likely 2/2 MM.  Pt seen, Examined, case & care plan d/w pt, residents at detail.  AM labs   Consults:  H/O DR San d/w at detail   Orthopedics eval done, work up in [place]   PT Eval  PO diet  DVT PPX   AM labs  D/W Family at detail.

## 2023-07-28 NOTE — OCCUPATIONAL THERAPY INITIAL EVALUATION ADULT - NSOTDMEREC_GEN_A_CORE
Pt already owns a commode, RW. Educated pt on benefits of a shower chair and educated on obtaining, pt expressed understanding via [place] intrepreter.

## 2023-07-28 NOTE — OCCUPATIONAL THERAPY INITIAL EVALUATION ADULT - PERTINENT HX OF CURRENT PROBLEM, REHAB EVAL
As per EMR, "Patient is 87F who presents with acute T12 fx and chronic T11, L1, and L4 vertebral compression fxs."

## 2023-07-28 NOTE — PROGRESS NOTE ADULT - PROBLEM SELECTOR PLAN 1
-Lower Back pain - Acute on chronic  Lumbar compression fracture likely Pathological; 2/2 MM & Osteoporosis   - pt admits to severe LBP worsening since Sun 7/23, denies inciting fall or trauma   - CT chest: Expansile lucent/lytic lateral L 6th rib lesion compatible with reported history of multiple myeloma  - CT Lumbar spine: Multilevel chronic vertebral body compression deformities at T11, L1, and L4. Superimposed acute or subacute components of the T11 and L1 fractures is not excluded  - Given: IV Dilaudid 0.5mg x2, IV Zofran x1, 1L IV NS bolus x1 in ED  - Pain regimen: IV Dilaudid 0.25mg for moderate pain, IV Dilaudid 0.5mg for severe pain   - CTA Chest - no acute abdominal pathology   - XR lumbar spine - Acute fracture of the superior endplate of T12 and chronic compression deformities, better characterized on concurrent CT  - MRI spine showed nothing acute.   - skeletal survey - Expansile lytic lesion left sixth rib consistent with history of multiple   myeloma better characterized on CT.  - Pt has TLSO brace at home, but forgot to bring it to the hospital. Daughter brought it in today.   - Ortho consulted, f/u recs  - PT/OT consulted-TLSO Brace -Lower Back pain - Acute on chronic Lumbar -Old, T12 likely new Fx   Lumbar compression fracture likely Pathological; 2/2 MM & Osteoporosis   - pt admits to severe LBP worsening since Sun 7/23, denies inciting fall or trauma   - CT chest: Expansile lucent/lytic lateral L 6th rib lesion compatible with reported history of multiple myeloma  - CT Lumbar spine: Multilevel chronic vertebral body compression deformities at T11, L1, and L4. Superimposed acute or subacute components of the T11 and L1 fractures is not excluded  - Pain regimen: IV Dilaudid 0.25mg for moderate pain, IV Dilaudid 0.5mg for severe pain   - CTA Chest - no acute abdominal pathology   - XR lumbar spine - Acute fracture of the superior endplate of T12 and chronic compression deformities, better characterized on concurrent CT  - MRI spine :  IMPRESSION: Marked compression deformities of T2 and L1 are nonspecific.   Superior endplate compression deformities T11,T12 and L4 have a benign   type appearance. Mild bony retropulsion at T2 without cord compression.   No abnormal intraspinal enhancement.  - skeletal survey - Expansile lytic lesion left sixth rib consistent with history of multiple   myeloma better characterized on CT.  - Pt has TLSO brace at home, but forgot to bring it to the hospital. Daughter brought it in today.   - Ortho follow up -WBAT PT, TLSO Brace- PT/OT consulted-TLSO Brace

## 2023-07-28 NOTE — PROGRESS NOTE ADULT - SUBJECTIVE AND OBJECTIVE BOX
Patient is a 87y old  Female who presents with a chief complaint of Lumbar compression fracture (28 Jul 2023 10:40)    HPI:  86yo F with PMHx of MM (on chemo), Hypertension presents to ED c/o low back pain. History is provided by daughter (Katherine) at bedside. Pt started chemo for MM at the end of June with Heme/Onc Dr. San. Pt has chronic low back pain, however on Sunday, pt's low back pain became progressively worse. Today, pt could not stand or walk due to pain so she was brought to ED for management. Pt did not suffer from any inciting falls or trauma.     ED course:  Vitals: HR 75, /71, RR 22, SpO2 98% on RA  Labs: RBC 3.54, Hgb 9.8, Hct 30.1, Na 127, Ca 8.4, Alk phos 121  EKG: pending  CT chest: Expansile lucent/lytic lateral L 6th rib lesion compatible with reported history of multiple myeloma  CT Lumbar spine: Multilevel chronic vertebral body compression deformities at T11, L1, and L4. Superimposed acute or subacute components of the T11 and L1 fractures is not excluded  Given: IV Dilaudid 0.5mg x2, IV Zofran x1, 1L IV NS bolus x1 (27 Jul 2023 05:24)    INTERVAL HPI:  7/27/23 - Patient was seen and examined at bedside. No overnight events. Pt is Uzbek speaking and daughter assisted in translating. Pt reports 8/10 low pack pain that is non-radiating. She has not received her stronger pain meds for it, so instructed to ask as needed. Pt reports no trauma to her low back. Reports history of fractures from her multiple myeloma. Otherwise Pt has no other complaints. Denies HA, n/v/d, chest pain, abdominal pain, SOB. Last bowel movement yesterday.   7/28/23 - Patient was seen and examined at bedside. No overnight events. Pt is Uzbek speaking,  ID [id number]. Pt reports 5/10 low back pain that is non-radiating today. Worsens w/ movement. States the pain regimen has been helping with the pain. Pt reports having a brace at home and just did not bring it to the hospital. Patient would like to use the bathroom, states she hasnt had a bowel movement since getting to the hospital. Given senna and miralax. Denies HA, n/v/d, chest pain, abdominal pain, SOB.     OVERNIGHT EVENTS:  None    Home Medications:  acyclovir 400 mg oral tablet: 1 orally 2 times a day (27 Jul 2023 05:38)  dexAMETHasone 4 mg oral tablet: 5 tab(s) orally once a day Every Monday with chemo (27 Jul 2023 05:38)  Lenalidomide 25 mg oral capsule: 1 orally once a day (27 Jul 2023 05:38)  losartan 25 mg oral tablet: 0.5 tab(s) orally once a day (27 Jul 2023 05:38)  morphine 30 mg/24 hours oral capsule, extended release: 1 orally 2 times a day as needed for  severe pain (27 Jul 2023 05:38)  Percocet 5 mg-325 mg oral tablet: 1 orally 2 times a day as needed for  severe pain (27 Jul 2023 05:38)      MEDICATIONS  (STANDING):  acyclovir   Oral Tab/Cap 400 milliGRAM(s) Oral two times a day  enoxaparin Injectable 40 milliGRAM(s) SubCutaneous every 24 hours  losartan 12.5 milliGRAM(s) Oral daily  polyethylene glycol 3350 17 Gram(s) Oral daily  senna 2 Tablet(s) Oral at bedtime    MEDICATIONS  (PRN):  HYDROmorphone  Injectable 0.25 milliGRAM(s) IV Push every 6 hours PRN Moderate Pain (4 - 6)  HYDROmorphone  Injectable 0.5 milliGRAM(s) IV Push every 6 hours PRN Severe Pain (7 - 10)  melatonin 3 milliGRAM(s) Oral at bedtime PRN Insomnia  ondansetron Injectable 4 milliGRAM(s) IV Push every 8 hours PRN Nausea and/or Vomiting      Allergies    No Known Allergies    Intolerances        Social History:  Tobacco: denies  EtOH: denies   Recreational drug use: denies  Lives with: daughter  Ambulates: with a walker  ADLs: with assistance (27 Jul 2023 05:24)      REVIEW OF SYSTEMS:  CONSTITUTIONAL: No fever, No chills, No fatigue, No myalgia, No Body ache, No Weakness  EYES: No eye pain,  No visual disturbances, No discharge, NO Redness  ENMT:  No ear pain, No nose bleed, No vertigo; No sinus pain, NO throat pain, No Congestion  NECK: No pain, No stiffness  RESPIRATORY: No cough, NO wheezing, No  hemoptysis, NO  shortness of breath  CARDIOVASCULAR: No chest pain, palpitations  GASTROINTESTINAL: No abdominal pain, NO epigastric pain. No nausea, No vomiting; No diarrhea, No constipation. [ x ] BM - 2 days ago  GENITOURINARY: No dysuria, No frequency, No urgency, No hematuria, NO incontinence  NEUROLOGICAL: No headaches, No dizziness, No numbness, No tingling, No tremors, No weakness  EXT: No Swelling, No Pain, No Edema  SKIN:  [ x ] No itching, burning, rashes, or lesions   MUSCULOSKELETAL: No joint pain ,No Jt swelling; No muscle pain, No back pain, No extremity pain  PSYCHIATRIC: No depression,  No anxiety,  No mood swings ,No difficulty sleeping at night  PAIN SCALE: [  ] None  [ x ] Other- low back pain 5/10, nonradiating  REST OF REVIEW Of SYSTEM - [ x ] Normal     Vital Signs Last 24 Hrs  T(C): 36.7 (28 Jul 2023 11:52), Max: 36.9 (27 Jul 2023 20:23)  T(F): 98.1 (28 Jul 2023 11:52), Max: 98.4 (27 Jul 2023 20:23)  HR: 90 (28 Jul 2023 11:52) (77 - 90)  BP: 110/70 (28 Jul 2023 11:52) (110/70 - 178/71)  BP(mean): --  RR: 20 (28 Jul 2023 11:52) (18 - 20)  SpO2: 97% (28 Jul 2023 11:52) (93% - 97%)    Parameters below as of 28 Jul 2023 11:52  Patient On (Oxygen Delivery Method): room air      Finger Stick        07-27 @ 07:01 - 07-28 @ 07:00  --------------------------------------------------------  IN: 100 mL / OUT: 300 mL / NET: -200 mL        PHYSICAL EXAM:  GENERAL:  [ x ] NAD , [ x ] well appearing, [  ] Agitated, [  ] Drowsy,  [  ] Lethargy, [  ] confused   HEAD:  [ x ] Normal, [  ] Other  EYES:  [ x ] EOMI, [ x ] PERRLA, [ x ] conjunctiva and sclera clear normal, [  ] Other,  [  ] Pallor,[  ] Discharge  ENMT:  [ x ] Normal, [  ] Moist mucous membranes, [  ] Good dentition, [  ] No Thrush  NECK:  [ x ] Supple, [  ] No JVD, [  ] Normal thyroid, [  ] Lymphadenopathy [  ] Other  CHEST/LUNG:  [ x ] Clear to auscultation bilaterally, [  ] Breath Sounds equal B/L / Decrease, [  ] poor effort  [ x ] No rales, [ x ] No rhonchi  [ x ]  No wheezing,   HEART:  [ x ] Regular rate and rhythm, [  ] tachycardia, [  ] Bradycardia,  [  ] irregular  [ x ] No murmurs, No rubs, No gallops, [  ] PPM in place (Mfr:  )  ABDOMEN:  [ x ] Soft, [ x ] Nontender, [ x ] Nondistended, [  ] No mass, [ x ] Bowel sounds present, [  ] obese  NERVOUS SYSTEM:  [ x ] Alert & Oriented X3, [ x ] Nonfocal  [  ] Confusion  [  ] Encephalopathic [  ] Sedated [  ] Unable to assess, [  ] Dementia [  ] Other-  EXTREMITIES: [ x ] 2+ Peripheral Pulses (radial), No clubbing, No cyanosis,  [  ] edema B/L lower EXT. [  ] PVD stasis skin changes B/L Lower EXT, [  ] wound  LYMPH: No lymphadenopathy noted  SKIN:  [ x ] No rashes or lesions, [  ] Pressure Ulcers, [  ] ecchymosis, [  ] Skin Tears, [  ] Other    DIET: Diet, Regular:   1000mL Fluid Restriction ([id number]) (07-27-23 @ 13:25)      LABS:                        10.1   3.87  )-----------( 251      ( 28 Jul 2023 07:56 )             30.5     28 Jul 2023 07:56    132    |  101    |  9      ----------------------------<  107    3.8     |  24     |  0.45     Ca    8.5        28 Jul 2023 07:56  Phos  2.8       28 Jul 2023 07:56  Mg     2.1       28 Jul 2023 07:56    TPro  6.3    /  Alb  3.1    /  TBili  0.3    /  DBili  x      /  AST  20     /  ALT  32     /  AlkPhos  106    28 Jul 2023 07:56      Urinalysis Basic - ( 28 Jul 2023 07:56 )    Color: x / Appearance: x / SG: x / pH: x  Gluc: 107 mg/dL / Ketone: x  / Bili: x / Urobili: x   Blood: x / Protein: x / Nitrite: x   Leuk Esterase: x / RBC: x / WBC x   Sq Epi: x / Non Sq Epi: x / Bacteria: x                           Anemia Panel:      Thyroid Panel:        Lipase: 114 U/L (07-26-23 @ 22:10)          RADIOLOGY & ADDITIONAL TESTS:  < from: MR Cervical Spine w/wo IV Cont (07.27.23 @ 20:11) >  INTERPRETATION:  CLINICAL INDICATION: Lumbar compression fractures,   multiple myeloma on chemotherapy    Magnetic resonance imaging of the cervical, thoracic, and lumbosacral   spine was carried out with sagittal surface coil imaging from C1to  S4    using T1 and fast spin echo T2 weighted images with and without fat   saturation technique with axial T1 and fast spin echo T2 weighted imaging   on a 1.5 Jelly magnet. Post contrast axial and sagittal T1 weighted   images were obtained. 6.5 cc of Gadavist were intravenously injected, 1.0   cc were discarded.    The cervical vertebrae are normal in signal intensity. There is mild loss   of height of the T7 vertebral bodies suggesting old compression   deformities. There is no bony retropulsion or canal compression. After   contrast administration there is normal osseous and vascular enhancement.    Evaluation of the thoracic spine demonstrates a marked compression   deformity of the T2 vertebral body consistent with vertebral planum with   mild bony retropulsion without significant cord compression.    There is a superior endplate compression deformity of T11 and T12 which   hasn't abuts benign type of appearance. There is no bony retropulsion.   After contrast administration there is nonspecific enhancement of the T11   and T12 vertebral bodies. The the conus terminates normally at the T12-L1   level.    Evaluation of the lumbar spine demonstrates a marked compression   deformity of the L1 vertebral body with abnormal signal and a moderate   compression deformity of L4 which is nonspecific. There is nonspecific   enhancement of the L1 vertebral body. There is mild degenerative   anterolisthesis of L4 and L5 with mild degenerative spinal stenosis.    Degenerative facet changes are identified at L5-S1. There is a   hypoplastic thecal sac at L5-S1.    The paraspinal soft tissues are unremarkable.    IMPRESSION: Marked compression deformities of T2 and L1 are nonspecific.   Superior endplatecompression deformities T11,T12 and L4 have a benign   type appearance. Mild bony retropulsion at T2 without cord compression.   No abnormal intraspinal enhancement.    < from: Xray Skeletal Survey, Complete (07.27.23 @ 12:17) >    IMPRESSION:    Expansile lytic lesion left sixth rib consistent with history of multiple   myeloma better characterized on CT.    Severe chronic compression deformities T2, L1 and L4. Increased T11   compression deformity, better characterized on CT.    --- End of Report ---    < end of copied text >                    --- End of Report ---            MARIA DEL CARMEN MIRELES MD; Attending Radiologist  This document has been electronically signed. Jul 27 2023  9:01PM    < end of copied text >      HEALTH ISSUES - PROBLEM Dx:  Multiple myeloma    Need for prophylactic measure    Hypertension    Anemia    Lumbar compression fracture    Hyponatremia            Consultant(s) Notes Reviewed:  [ x ] YES     Care Discussed with [X] Consultants  [ x ] Patient  [ x ] Family [  ] HCP [  ]   [  ] Social Service  [  ] RN, [  ] Physical Therapy,[  ] Palliative care team  DVT PPX: [ x ] Lovenox, [  ] S C Heparin, [  ] Coumadin, [  ] Xarelto, [  ] Eliquis, [  ] Pradaxa, [  ] IV Heparin drip, [  ] SCD [  ] Contraindication 2 to GI Bleed,[  ] Ambulation [  ] Contraindicated 2 to  bleed [  ] Contraindicated 2 to Brain Bleed  Advanced directive: [ x ] None, [  ] DNR/DNI Patient is a 87y old  Female who presents with a chief complaint of Lumbar compression fracture (28 Jul 2023 10:40)    HPI:  88yo F with PMHx of MM (on chemo), Hypertension presents to ED c/o low back pain. History is provided by daughter (Katherine) at bedside. Pt started chemo for MM at the end of June with Heme/Onc Dr. San. Pt has chronic low back pain, however on Sunday, pt's low back pain became progressively worse. Today, pt could not stand or walk due to pain so she was brought to ED for management. Pt did not suffer from any inciting falls or trauma.     ED course:  Vitals: HR 75, /71, RR 22, SpO2 98% on RA  Labs: RBC 3.54, Hgb 9.8, Hct 30.1, Na 127, Ca 8.4, Alk phos 121  EKG: pending  CT chest: Expansile lucent/lytic lateral L 6th rib lesion compatible with reported history of multiple myeloma  CT Lumbar spine: Multilevel chronic vertebral body compression deformities at T11, L1, and L4. Superimposed acute or subacute components of the T11 and L1 fractures is not excluded  Given: IV Dilaudid 0.5mg x2, IV Zofran x1, 1L IV NS bolus x1 (27 Jul 2023 05:24)    INTERVAL HPI:  7/27/23 - Patient was seen and examined at bedside. No overnight events. Pt is Bulgarian speaking and daughter assisted in translating. Pt reports 8/10 low pack pain that is non-radiating. She has not received her stronger pain meds for it, so instructed to ask as needed. Pt reports no trauma to her low back. Reports history of fractures from her multiple myeloma. Otherwise Pt has no other complaints. Denies HA, n/v/d, chest pain, abdominal pain, SOB. Last bowel movement yesterday.   7/28/23 - Patient was seen and examined at bedside. No overnight events. Pt is Bulgarian speaking,  ID [id number]. Pt reports 5/10 low back pain that is non-radiating today. Worsens w/ movement. States the pain regimen has been helping with the pain. Pt reports having a brace at home and just did not bring it to the hospital. Patient would like to use the bathroom, states she hasnt had a bowel movement since getting to the hospital. Given senna and miralax. Denies HA, n/v/d, chest pain, abdominal pain, SOB. D/C Plan home today.    OVERNIGHT EVENTS:  None    Home Medications:  acyclovir 400 mg oral tablet: 1 orally 2 times a day (27 Jul 2023 05:38)  dexAMETHasone 4 mg oral tablet: 5 tab(s) orally once a day Every Monday with chemo (27 Jul 2023 05:38)  Lenalidomide 25 mg oral capsule: 1 orally once a day (27 Jul 2023 05:38)  losartan 25 mg oral tablet: 0.5 tab(s) orally once a day (27 Jul 2023 05:38)  morphine 30 mg/24 hours oral capsule, extended release: 1 orally 2 times a day as needed for  severe pain (27 Jul 2023 05:38)  Percocet 5 mg-325 mg oral tablet: 1 orally 2 times a day as needed for  severe pain (27 Jul 2023 05:38)      MEDICATIONS  (STANDING):  acyclovir   Oral Tab/Cap 400 milliGRAM(s) Oral two times a day  enoxaparin Injectable 40 milliGRAM(s) SubCutaneous every 24 hours  losartan 12.5 milliGRAM(s) Oral daily  polyethylene glycol 3350 17 Gram(s) Oral daily  senna 2 Tablet(s) Oral at bedtime    MEDICATIONS  (PRN):  HYDROmorphone  Injectable 0.25 milliGRAM(s) IV Push every 6 hours PRN Moderate Pain (4 - 6)  HYDROmorphone  Injectable 0.5 milliGRAM(s) IV Push every 6 hours PRN Severe Pain (7 - 10)  melatonin 3 milliGRAM(s) Oral at bedtime PRN Insomnia  ondansetron Injectable 4 milliGRAM(s) IV Push every 8 hours PRN Nausea and/or Vomiting      Allergies    No Known Allergies    Intolerances        Social History:  Tobacco: denies  EtOH: denies   Recreational drug use: denies  Lives with: daughter  Ambulates: with a walker  ADLs: with assistance (27 Jul 2023 05:24)      REVIEW OF SYSTEMS:  CONSTITUTIONAL: No fever, No chills, No fatigue, No myalgia, No Body ache, No Weakness  EYES: No eye pain,  No visual disturbances, No discharge, NO Redness  ENMT:  No ear pain, No nose bleed, No vertigo; No sinus pain, NO throat pain, No Congestion  NECK: No pain, No stiffness  RESPIRATORY: No cough, NO wheezing, No  hemoptysis, NO  shortness of breath  CARDIOVASCULAR: No chest pain, palpitations  GASTROINTESTINAL: No abdominal pain, NO epigastric pain. No nausea, No vomiting; No diarrhea, No constipation. [ x ] BM - 2 days ago  GENITOURINARY: No dysuria, No frequency, No urgency, No hematuria, NO incontinence  NEUROLOGICAL: No headaches, No dizziness, No numbness, No tingling, No tremors, No weakness  EXT: No Swelling, No Pain, No Edema  SKIN:  [ x ] No itching, burning, rashes, or lesions   MUSCULOSKELETAL: No joint pain ,No Jt swelling; No muscle pain, No back pain, No extremity pain  PSYCHIATRIC: No depression,  No anxiety,  No mood swings ,No difficulty sleeping at night  PAIN SCALE: [  ] None  [ x ] Other- low back pain 5/10, nonradiating   REST OF REVIEW Of SYSTEM - [ x ] Normal     Vital Signs Last 24 Hrs  T(C): 36.7 (28 Jul 2023 11:52), Max: 36.9 (27 Jul 2023 20:23)  T(F): 98.1 (28 Jul 2023 11:52), Max: 98.4 (27 Jul 2023 20:23)  HR: 90 (28 Jul 2023 11:52) (77 - 90)  BP: 110/70 (28 Jul 2023 11:52) (110/70 - 178/71)  BP(mean): --  RR: 20 (28 Jul 2023 11:52) (18 - 20)  SpO2: 97% (28 Jul 2023 11:52) (93% - 97%)    Parameters below as of 28 Jul 2023 11:52  Patient On (Oxygen Delivery Method): room air      Finger Stick        07-27 @ 07:01  -  07-28 @ 07:00  --------------------------------------------------------  IN: 100 mL / OUT: 300 mL / NET: -200 mL        PHYSICAL EXAM:  GENERAL:  [ x ] NAD , [ x ] well appearing, [  ] Agitated, [  ] Drowsy,  [  ] Lethargy, [  ] confused   HEAD:  [ x ] Normal, [  ] Other  EYES:  [ x ] EOMI, [ x ] PERRLA, [ x ] conjunctiva and sclera clear normal, [  ] Other,  [  x] Pallor,[  ] Discharge  ENMT:  [ x ] Normal, [ x ] Moist mucous membranes, [x  ] Good dentition, [x  ] No Thrush  NECK:  [ x ] Supple, [ x ] No JVD, [ x ] Normal thyroid, [  ] Lymphadenopathy [  ] Other  CHEST/LUNG:  [ x ] Clear to auscultation bilaterally, [  ] Breath Sounds equal B/L / Decrease, [  ] poor effort  [ x ] No rales, [ x ] No rhonchi  [ x ]  No wheezing,   HEART:  [ x ] Regular rate and rhythm, [  ] tachycardia, [  ] Bradycardia,  [  ] irregular  [ x ] No murmurs, No rubs, No gallops, [  ] PPM in place (Mfr:  )  ABDOMEN:  [ x ] Soft, [ x ] Nontender, [ x ] Nondistended, [  ] No mass, [ x ] Bowel sounds present, [ x ] obese  NERVOUS SYSTEM:  [ x ] Alert & Oriented X3, [ x ] Nonfocal  [  ] Confusion  [  ] Encephalopathic [  ] Sedated [  ] Unable to assess, [  ] Dementia [  ] Other-  EXTREMITIES: [ x ] 2+ Peripheral Pulses (radial), No clubbing, No cyanosis,  [  ] edema B/L lower EXT. [  ] PVD stasis skin changes B/L Lower EXT, [  ] wound  LYMPH: No lymphadenopathy noted  SKIN:  [ x ] No rashes or lesions, [  ] Pressure Ulcers, [  ] ecchymosis, [  ] Skin Tears, [  ] Other    DIET: Diet, Regular:   1000mL Fluid Restriction ([id number]) (07-27-23 @ 13:25)      LABS:                        10.1   3.87  )-----------( 251      ( 28 Jul 2023 07:56 )             30.5     28 Jul 2023 07:56    132    |  101    |  9      ----------------------------<  107    3.8     |  24     |  0.45     Ca    8.5        28 Jul 2023 07:56  Phos  2.8       28 Jul 2023 07:56  Mg     2.1       28 Jul 2023 07:56    TPro  6.3    /  Alb  3.1    /  TBili  0.3    /  DBili  x      /  AST  20     /  ALT  32     /  AlkPhos  106    28 Jul 2023 07:56      Urinalysis Basic - ( 28 Jul 2023 07:56 )    Color: x / Appearance: x / SG: x / pH: x  Gluc: 107 mg/dL / Ketone: x  / Bili: x / Urobili: x   Blood: x / Protein: x / Nitrite: x   Leuk Esterase: x / RBC: x / WBC x   Sq Epi: x / Non Sq Epi: x / Bacteria: x    Lipase: 114 U/L (07-26-23 @ 22:10)          RADIOLOGY & ADDITIONAL TESTS:  < from: MR Cervical Spine w/wo IV Cont (07.27.23 @ 20:11) >  INTERPRETATION:  CLINICAL INDICATION: Lumbar compression fractures,   multiple myeloma on chemotherapy    Magnetic resonance imaging of the cervical, thoracic, and lumbosacral   spine was carried out with sagittal surface coil imaging from C1to  S4    using T1 and fast spin echo T2 weighted images with and without fat   saturation technique with axial T1 and fast spin echo T2 weighted imaging   on a 1.5 Jelly magnet. Post contrast axial and sagittal T1 weighted   images were obtained. 6.5 cc of Gadavist were intravenously injected, 1.0   cc were discarded.    The cervical vertebrae are normal in signal intensity. There is mild loss   of height of the T7 vertebral bodies suggesting old compression   deformities. There is no bony retropulsion or canal compression. After   contrast administration there is normal osseous and vascular enhancement.    Evaluation of the thoracic spine demonstrates a marked compression   deformity of the T2 vertebral body consistent with vertebral planum with   mild bony retropulsion without significant cord compression.    There is a superior endplate compression deformity of T11 and T12 which   hasn't abuts benign type of appearance. There is no bony retropulsion.   After contrast administration there is nonspecific enhancement of the T11   and T12 vertebral bodies. The the conus terminates normally at the T12-L1   level.    Evaluation of the lumbar spine demonstrates a marked compression   deformity of the L1 vertebral body with abnormal signal and a moderate   compression deformity of L4 which is nonspecific. There is nonspecific   enhancement of the L1 vertebral body. There is mild degenerative   anterolisthesis of L4 and L5 with mild degenerative spinal stenosis.    Degenerative facet changes are identified at L5-S1. There is a   hypoplastic thecal sac at L5-S1.    The paraspinal soft tissues are unremarkable.    IMPRESSION: Marked compression deformities of T2 and L1 are nonspecific.   Superior endplatecompression deformities T11,T12 and L4 have a benign   type appearance. Mild bony retropulsion at T2 without cord compression.   No abnormal intraspinal enhancement.    < from: Xray Skeletal Survey, Complete (07.27.23 @ 12:17) >    IMPRESSION:    Expansile lytic lesion left sixth rib consistent with history of multiple   myeloma better characterized on CT.    Severe chronic compression deformities T2, L1 and L4. Increased T11   compression deformity, better characterized on CT.    --- End of Report ---    < end of copied text >                    --- End of Report ---            MARIA DEL CARMEN MIRELES MD; Attending Radiologist  This document has been electronically signed. Jul 27 2023  9:01PM    < end of copied text >      HEALTH ISSUES - PROBLEM Dx:  Multiple myeloma    Need for prophylactic measure    Hypertension    Anemia    Lumbar compression fracture    Hyponatremia            Consultant(s) Notes Reviewed:  [ x ] YES     Care Discussed with [X] Consultants  [ x ] Patient  [ x ] Family- dtr  [  ] HCP [x  ]   [  ] Social Service  [ x ] RN, [x  ] Physical Therapy,[  ] Palliative care team  DVT PPX: [ x ] Lovenox, [  ] S C Heparin, [  ] Coumadin, [  ] Xarelto, [  ] Eliquis, [  ] Pradaxa, [  ] IV Heparin drip, [  ] SCD [  ] Contraindication 2 to GI Bleed,[  ] Ambulation [  ] Contraindicated 2 to  bleed [  ] Contraindicated 2 to Brain Bleed  Advanced directive: [ x ] None, [  ] DNR/DNI

## 2023-07-28 NOTE — PROGRESS NOTE ADULT - SUBJECTIVE AND OBJECTIVE BOX
Patient is 87F who presents with acute T12 fx and chronic T11, L1, and L4 vertebral compression fxs. Patient seen and examined and bedside. Patient complains of mild pain in her back. Patient denies any numbness, tingling, fever, SOB, chest pain, nausea, vomiting, or diarrhea.    Vital Signs (24 Hrs):  T(C): 36.4 (07-28-23 @ 05:22), Max: 36.9 (07-27-23 @ 20:23)  HR: 77 (07-28-23 @ 05:22) (67 - 90)  BP: 122/71 (07-28-23 @ 05:22) (122/71 - 138/79)  RR: 20 (07-28-23 @ 05:22) (16 - 20)  SpO2: 95% (07-28-23 @ 05:22) (93% - 97%)  Wt(kg): --    LABS:                          9.0    4.82  )-----------( 211      ( 27 Jul 2023 06:00 )             27.1     07-27    129<L>  |  98  |  16  ----------------------------<  100<H>  4.7   |  26  |  0.69    Ca    8.0<L>      27 Jul 2023 06:00    TPro  5.7<L>  /  Alb  2.9<L>  /  TBili  0.3  /  DBili  x   /  AST  15  /  ALT  30  /  AlkPhos  95  07-27    LIVER FUNCTIONS - ( 27 Jul 2023 06:00 )  Alb: 2.9 g/dL / Pro: 5.7 g/dL / ALK PHOS: 95 U/L / ALT: 30 U/L / AST: 15 U/L / GGT: x           Physical Exam:  General: NAD, patient resting comfortably in bed    Spine:  Motor:                 C5                C6              C7               C8           T1   R            5/5                5/5            5/5             5/5          5/5  L             5/5               5/5             5/5             5/5          5/5                L2             L3            L4             L5            S1  R         5/5           5/5          5/5             5/5           5/5  L          5/5          5/5           5/5             5/5           5/5    Sensory:            C5         C6         C7      C8       T1        (0=absent, 1=impaired, 2=normal, NT=not testable)  R         2            2           2        2         2  L          2            2           2        2         2               L2          L3         L4      L5       S1         (0=absent, 1=impaired, 2=normal, NT=not testable)  R         2            2            2        2        2  L          2            2           2        2         2    Negative Babinski  Negative Montelongo  Negative Clonus    Compartments soft and compressible  Calves nontender to palpation    A&P:  Patient is 87F who presents with acute T12 fx and chronic T11, L1, and L4 vertebral compression fxs.    -MRI and skeletal survey negative for metastatic processes  -WBAT  -PT/OT  -Analgesia  -f/u morning labs  -Medical management appreciated  -DVT ppx per primary team  -No further orthopedic interventions required at this time  -Plan discussed with Dr. Feliciano who agrees with above   Patient is 87F who presents with acute T12 fx and chronic T11, L1, and L4 vertebral compression fxs. Patient seen and examined and bedside. Patient complains of mild pain in her back. Patient denies any numbness, tingling, fever, SOB, chest pain, nausea, vomiting, or diarrhea.    Vital Signs (24 Hrs):  T(C): 36.4 (07-28-23 @ 05:22), Max: 36.9 (07-27-23 @ 20:23)  HR: 77 (07-28-23 @ 05:22) (67 - 90)  BP: 122/71 (07-28-23 @ 05:22) (122/71 - 138/79)  RR: 20 (07-28-23 @ 05:22) (16 - 20)  SpO2: 95% (07-28-23 @ 05:22) (93% - 97%)  Wt(kg): --    LABS:                          9.0    4.82  )-----------( 211      ( 27 Jul 2023 06:00 )             27.1     07-27    129<L>  |  98  |  16  ----------------------------<  100<H>  4.7   |  26  |  0.69    Ca    8.0<L>      27 Jul 2023 06:00    TPro  5.7<L>  /  Alb  2.9<L>  /  TBili  0.3  /  DBili  x   /  AST  15  /  ALT  30  /  AlkPhos  95  07-27    LIVER FUNCTIONS - ( 27 Jul 2023 06:00 )  Alb: 2.9 g/dL / Pro: 5.7 g/dL / ALK PHOS: 95 U/L / ALT: 30 U/L / AST: 15 U/L / GGT: x           Physical Exam:  General: NAD, patient resting comfortably in bed    Spine:  Motor:                 C5                C6              C7               C8           T1   R            5/5                5/5            5/5             5/5          5/5  L             5/5               5/5             5/5             5/5          5/5                L2             L3            L4             L5            S1  R         5/5           5/5          5/5             5/5           5/5  L          5/5          5/5           5/5             5/5           5/5    Sensory:            C5         C6         C7      C8       T1        (0=absent, 1=impaired, 2=normal, NT=not testable)  R         2            2           2        2         2  L          2            2           2        2         2               L2          L3         L4      L5       S1         (0=absent, 1=impaired, 2=normal, NT=not testable)  R         2            2            2        2        2  L          2            2           2        2         2    Negative Babinski  Negative Montelongo  Negative Clonus    Compartments soft and compressible  Calves nontender to palpation    Imaging:  MR C/T/L spine: Marked compression deformities of T2 and L1 are nonspecific. Superior endplate compression deformities T11,T12 and L4 have a benign type appearance. Mild bony retropulsion at T2 without cord compression. No abnormal intraspinal enhancement.      A&P:  Patient is 87F who presents with acute T12 fx and chronic T11, L1, and L4 vertebral compression fxs.    -Skeletal survey did not show any obvious mets to long bones or pelvis  -Neuroaxial MRI did not show epidural extension of mets. Mainly noted similar compression fractures from prior scans.  -WBAT  -TLSO brace ordered; recommended to wear while out of bed for comfort  -PT/OT  -Analgesia  -f/u morning labs  -Medical management appreciated  -Heme/onc following, recommendations appreciated  -DVT ppx per primary team  -No further orthopedic interventions required at this time  -Can follow up with Dr. Feliciano in 3-4 weeks in the office, call for appointment  -Plan discussed with Dr. Feliciano who agrees with above

## 2023-07-28 NOTE — OCCUPATIONAL THERAPY INITIAL EVALUATION ADULT - ADDITIONAL COMMENTS
Pt lives with her daughter in a house with a few SKY, no steps inside. Pt's daughter works, however, as per EMR, daughter stated she can assist pt as needed. Pt uses a RW at baseline. Pt owns a commode and a stall shower with curtains. Pt reports her daughter assists with ADLs as needed.    Educated pt on benefits of a shower chair and how to obtain, pt expressed understanding to education via .

## 2023-07-28 NOTE — PHYSICAL THERAPY INITIAL EVALUATION ADULT - PATIENT PROFILE REVIEW, REHAB EVAL
PT orders received: ambulate with assistance. Consult with GERRI Chew, pt may participate in PT evaluation./yes

## 2023-07-28 NOTE — OCCUPATIONAL THERAPY INITIAL EVALUATION ADULT - GENERAL OBSERVATIONS, REHAB EVAL
Pt received supine in bed, in NAD, +primafit, on RA, agreeable to OT eval. Pt left OOB in bedside chair, primafit and TLSO intact, in NAD, all needs in reach and family present.

## 2023-07-28 NOTE — PHYSICAL THERAPY INITIAL EVALUATION ADULT - GENERAL OBSERVATIONS, REHAB EVAL
Patient was received semi-supine in bed, c/o pain but willing to participate in PT evaluation. Niece presenting at bedside.

## 2023-07-28 NOTE — PROGRESS NOTE ADULT - PROBLEM SELECTOR PLAN 5
- Continue home Losartan 12.5mg qd with hold parameters
- Continue home Losartan 12.5mg qd with hold parameters

## 2023-07-28 NOTE — PROGRESS NOTE ADULT - SUBJECTIVE AND OBJECTIVE BOX
Patient is a 87y old  Female who presents with a chief complaint of Lumbar compression fracture (2023 07:16)    Patient seen in follow up for hyponatremia.        PAST MEDICAL HISTORY:  Hypertension    GERD (gastroesophageal reflux disease)    History of ankle surgery    Ankle fracture, left    Gastro-esophageal reflux disease without esophagitis    Benign essential hypertension    Chronic cough    Chronic low back pain    Dyspnea on exertion    Back pain with sciatica    Regurgitation of food    Rib fracture    Fall at home    Multiple myeloma    Anemia of chronic disease    Hyponatremia    Lumbar compression fracture      MEDICATIONS  (STANDING):  acyclovir   Oral Tab/Cap 400 milliGRAM(s) Oral two times a day  enoxaparin Injectable 40 milliGRAM(s) SubCutaneous every 24 hours  losartan 12.5 milliGRAM(s) Oral daily  polyethylene glycol 3350 17 Gram(s) Oral daily  senna 2 Tablet(s) Oral at bedtime    MEDICATIONS  (PRN):  HYDROmorphone  Injectable 0.5 milliGRAM(s) IV Push every 6 hours PRN Severe Pain (7 - 10)  HYDROmorphone  Injectable 0.25 milliGRAM(s) IV Push every 6 hours PRN Moderate Pain (4 - 6)  melatonin 3 milliGRAM(s) Oral at bedtime PRN Insomnia  ondansetron Injectable 4 milliGRAM(s) IV Push every 8 hours PRN Nausea and/or Vomiting    T(C): 36.4 (23 @ 05:22), Max: 36.9 (23 @ 20:23)  HR: 77 (23 @ 05:22) (67 - 90)  BP: 122/71 (23 @ 05:22) (122/71 - 141/74)  RR: 20 (23 @ 05:22) (16 - 22)  SpO2: 95% (23 @ 05:22) (93% - 98%)  Wt(kg): --  I&O's Detail    2023 07:01  -  2023 07:00  --------------------------------------------------------  IN:    IV PiggyBack: 100 mL  Total IN: 100 mL    OUT:    Voided (mL): 300 mL  Total OUT: 300 mL    Total NET: -200 mL          PHYSICAL EXAM:  General: No distress  Respiratory: b/l air entry  Cardiovascular: S1 S2  Gastrointestinal: soft  Extremities:  edema                              9.0    4.82  )-----------( 211      ( 2023 06:00 )             27.1         129<L>  |  98  |  16  ----------------------------<  100<H>  4.7   |  26  |  0.69    Ca    8.0<L>      2023 06:00    TPro  6.0  /  Alb  x   /  TBili  x   /  DBili  x   /  AST  x   /  ALT  x   /  AlkPhos  x           LIVER FUNCTIONS - ( 2023 09:00 )  Alb: x     / Pro: 6.0 g/dL / ALK PHOS: x     / ALT: x     / AST: x     / GGT: x           Urinalysis Basic - ( 2023 07:03 )    Color: Yellow / Appearance: Clear / S.038 / pH: x  Gluc: x / Ketone: Negative mg/dL  / Bili: Negative / Urobili: 0.2 mg/dL   Blood: x / Protein: Trace mg/dL / Nitrite: Negative   Leuk Esterase: Negative / RBC: x / WBC x   Sq Epi: x / Non Sq Epi: x / Bacteria: x        Sodium, Serum: 129 ( @ 06:00)  Sodium, Serum: 127 ( @ 22:10)    Creatinine, Serum: 0.69 ( @ 06:00)  Creatinine, Serum: 0.89 ( @ 22:10)    Potassium, Serum: 4.7 ( @ 06:00)  Potassium, Serum: 3.8 ( @ 22:10)    Hemoglobin: 9.0 ( @ 06:00)  Hemoglobin: 9.8 ( @ 22:10)

## 2023-07-28 NOTE — PROGRESS NOTE ADULT - PROBLEM SELECTOR PLAN 3
Likely anemia of chronic disease 2/2 MM & Chemo Rx   - Hgb 9.8 on admission, (baseline Hgb ~9-10 per chart review) -> Hgb 9 today.  - No signs/symptoms of bleeding  - Transfuse for Hgb <7  CBC in AM Likely anemia of chronic disease 2/2 MM & Chemo Rx   - Hgb 9.8 on admission, (baseline Hgb ~9-10 per chart review) -> Hgb 9 today.  - No signs/symptoms of bleeding  - Transfuse for Hgb <7  CBC with Dr San

## 2023-07-29 ENCOUNTER — APPOINTMENT (OUTPATIENT)
Dept: RADIOLOGY | Facility: CLINIC | Age: 88
End: 2023-07-29

## 2023-07-29 ENCOUNTER — TRANSCRIPTION ENCOUNTER (OUTPATIENT)
Age: 88
End: 2023-07-29

## 2023-07-29 LAB
CULTURE RESULTS: SIGNIFICANT CHANGE UP
SPECIMEN SOURCE: SIGNIFICANT CHANGE UP

## 2023-07-31 ENCOUNTER — TRANSCRIPTION ENCOUNTER (OUTPATIENT)
Age: 88
End: 2023-07-31

## 2023-07-31 LAB
% ALBUMIN: SIGNIFICANT CHANGE UP %
% ALPHA 1: SIGNIFICANT CHANGE UP %
% ALPHA 2: SIGNIFICANT CHANGE UP %
% BETA: SIGNIFICANT CHANGE UP %
% GAMMA: SIGNIFICANT CHANGE UP %
% M SPIKE: SIGNIFICANT CHANGE UP %
ALBUMIN SERPL ELPH-MCNC: SIGNIFICANT CHANGE UP G/DL (ref 3.6–5.5)
ALPHA1 GLOB SERPL ELPH-MCNC: SIGNIFICANT CHANGE UP G/DL (ref 0.1–0.4)
ALPHA2 GLOB SERPL ELPH-MCNC: SIGNIFICANT CHANGE UP G/DL (ref 0.5–1)
B-GLOBULIN SERPL ELPH-MCNC: SIGNIFICANT CHANGE UP G/DL (ref 0.5–1)
GAMMA GLOBULIN: SIGNIFICANT CHANGE UP G/DL (ref 0.6–1.6)
M-SPIKE: SIGNIFICANT CHANGE UP G/DL (ref 0–0)
PROT PATTERN SERPL ELPH-IMP: SIGNIFICANT CHANGE UP

## 2023-08-01 ENCOUNTER — NON-APPOINTMENT (OUTPATIENT)
Age: 88
End: 2023-08-01

## 2023-08-04 ENCOUNTER — APPOINTMENT (OUTPATIENT)
Dept: MRI IMAGING | Facility: CLINIC | Age: 88
End: 2023-08-04

## 2023-08-08 ENCOUNTER — TRANSCRIPTION ENCOUNTER (OUTPATIENT)
Age: 88
End: 2023-08-08

## 2023-08-26 ENCOUNTER — TRANSCRIPTION ENCOUNTER (OUTPATIENT)
Age: 88
End: 2023-08-26

## 2023-09-05 NOTE — ED ADULT NURSE NOTE - DISCHARGE DATE/TIME
01-Apr-2023 16:14 SSKI Counseling:  I discussed with the patient the risks of SSKI including but not limited to thyroid abnormalities, metallic taste, GI upset, fever, headache, acne, arthralgias, paraesthesias, lymphadenopathy, easy bleeding, arrhythmias, and allergic reaction.

## 2023-09-28 NOTE — ED PROVIDER NOTE - DIFFERENTIAL DIAGNOSIS
Patient presenting to the emergency room with a chief complaint of back pain in the setting of multiple myeloma.  High suspicion for likely compression fracture.  At this time no red flags are noted.  Also consider additional intra-abdominal pathology and renal pathology.  Will obtain screening labs medicate for pain obtain CT imaging of the lumbar spine monitor.  Patient will likely require admission due to extreme pain and inability to ambulate.  Signed out to night attending pending results of labs and imaging. Differential Diagnosis no

## 2023-10-27 NOTE — PATIENT PROFILE ADULT - FUNCTIONAL ASSESSMENT - BASIC MOBILITY 3.
Danielle Sanders called requesting a refill of the below medication which has been pended for you:     Requested Prescriptions     Pending Prescriptions Disp Refills    spironolactone (ALDACTONE) 50 MG tablet [Pharmacy Med Name: SPIRONOLACTONE 50MG TABLETS] 90 tablet 0     Sig: TAKE 1 TABLET BY MOUTH DAILY    atorvastatin (LIPITOR) 20 MG tablet [Pharmacy Med Name: ATORVASTATIN 20MG TABLETS] 90 tablet 0     Sig: TAKE 1 TABLET BY MOUTH DAILY       Last Appointment Date: 8/31/2023  Next Appointment Date: Visit date not found    Allergies   Allergen Reactions    Claritin-D 12 Hour [Loratadine-Pseudoephedrine Er] Other (See Comments)     \"it intensifies my migraines\"    Demerol Hcl [Meperidine] Other (See Comments)     Aggression    Percocet [Oxycodone-Acetaminophen] Rash 4 = No assist / stand by assistance

## 2023-12-12 PROBLEM — D63.8 ANEMIA IN OTHER CHRONIC DISEASES CLASSIFIED ELSEWHERE: Chronic | Status: ACTIVE | Noted: 2023-07-27

## 2023-12-12 PROBLEM — E87.1 HYPO-OSMOLALITY AND HYPONATREMIA: Chronic | Status: ACTIVE | Noted: 2023-07-27

## 2023-12-12 PROBLEM — S32.000A WEDGE COMPRESSION FRACTURE OF UNSPECIFIED LUMBAR VERTEBRA, INITIAL ENCOUNTER FOR CLOSED FRACTURE: Chronic | Status: ACTIVE | Noted: 2023-07-27

## 2023-12-12 PROBLEM — C90.00 MULTIPLE MYELOMA NOT HAVING ACHIEVED REMISSION: Chronic | Status: ACTIVE | Noted: 2023-07-27

## 2023-12-13 ENCOUNTER — APPOINTMENT (OUTPATIENT)
Dept: INTERNAL MEDICINE | Facility: CLINIC | Age: 88
End: 2023-12-13
Payer: MEDICARE

## 2023-12-13 VITALS
BODY MASS INDEX: 28.27 KG/M2 | HEART RATE: 77 BPM | DIASTOLIC BLOOD PRESSURE: 69 MMHG | RESPIRATION RATE: 17 BRPM | TEMPERATURE: 97.7 F | OXYGEN SATURATION: 99 % | HEIGHT: 60 IN | WEIGHT: 144 LBS | SYSTOLIC BLOOD PRESSURE: 152 MMHG

## 2023-12-13 DIAGNOSIS — K40.90 UNILATERAL INGUINAL HERNIA, W/OUT OBSTRUCTION OR GANGRENE, NOT SPECIFIED AS RECURRENT: ICD-10-CM

## 2023-12-13 DIAGNOSIS — I10 ESSENTIAL (PRIMARY) HYPERTENSION: ICD-10-CM

## 2023-12-13 PROCEDURE — 99214 OFFICE O/P EST MOD 30 MIN: CPT

## 2023-12-13 NOTE — HISTORY OF PRESENT ILLNESS
[FreeTextEntry8] : 88 years old female with MM presents c/o right groin bulge , noticed last week, painless, denies any precipitating event, not lifted any weight, she is currently on chemotherapy for multiple myeloma .

## 2023-12-13 NOTE — PHYSICAL EXAM
[Normal] : no acute distress, well nourished, well developed and well-appearing [Non Tender] : non-tender [Normal Bowel Sounds] : normal bowel sounds [de-identified] : right inguinal hernia

## 2023-12-13 NOTE — PLAN
[FreeTextEntry1] : * referral for CT abdomen and pelvis * general surgery referral * c/w losartan * f/u with oncologist on chemotherapy * will call back for CT results.

## 2023-12-15 ENCOUNTER — APPOINTMENT (OUTPATIENT)
Dept: ORTHOPEDIC SURGERY | Facility: CLINIC | Age: 88
End: 2023-12-15
Payer: MEDICARE

## 2023-12-15 DIAGNOSIS — M79.605 PAIN IN LEFT LEG: ICD-10-CM

## 2023-12-15 PROCEDURE — 99213 OFFICE O/P EST LOW 20 MIN: CPT | Mod: 25

## 2023-12-15 PROCEDURE — 73552 X-RAY EXAM OF FEMUR 2/>: CPT | Mod: LT

## 2023-12-30 ENCOUNTER — EMERGENCY (EMERGENCY)
Facility: HOSPITAL | Age: 88
LOS: 1 days | Discharge: HOME | End: 2023-12-30
Attending: EMERGENCY MEDICINE | Admitting: EMERGENCY MEDICINE
Payer: COMMERCIAL

## 2023-12-30 VITALS
WEIGHT: 145.06 LBS | OXYGEN SATURATION: 98 % | SYSTOLIC BLOOD PRESSURE: 166 MMHG | DIASTOLIC BLOOD PRESSURE: 75 MMHG | HEART RATE: 107 BPM | RESPIRATION RATE: 18 BRPM | TEMPERATURE: 98 F | HEIGHT: 60 IN

## 2023-12-30 DIAGNOSIS — Z90.89 ACQUIRED ABSENCE OF OTHER ORGANS: Chronic | ICD-10-CM

## 2023-12-30 DIAGNOSIS — Z98.890 OTHER SPECIFIED POSTPROCEDURAL STATES: Chronic | ICD-10-CM

## 2023-12-30 DIAGNOSIS — S82.892A OTHER FRACTURE OF LEFT LOWER LEG, INITIAL ENCOUNTER FOR CLOSED FRACTURE: Chronic | ICD-10-CM

## 2023-12-30 PROCEDURE — 99285 EMERGENCY DEPT VISIT HI MDM: CPT

## 2023-12-30 NOTE — ED ADULT NURSE NOTE - NSFALLUNIVINTERV_ED_ALL_ED
Bed/Stretcher in lowest position, wheels locked, appropriate side rails in place/Call bell, personal items and telephone in reach/Instruct patient to call for assistance before getting out of bed/chair/stretcher/Non-slip footwear applied when patient is off stretcher/[place] to call system/Physically safe environment - no spills, clutter or unnecessary equipment/Purposeful proactive rounding/Room/bathroom lighting operational, light cord in reach Bed/Stretcher in lowest position, wheels locked, appropriate side rails in place/Call bell, personal items and telephone in reach/Instruct patient to call for assistance before getting out of bed/chair/stretcher/Non-slip footwear applied when patient is off stretcher/[place] to call system/Physically safe environment - no spills, clutter or unnecessary equipment/Purposeful proactive rounding/Room/bathroom lighting operational, light cord in reach

## 2023-12-30 NOTE — ED ADULT NURSE NOTE - OBJECTIVE STATEMENT
87 y/o female received ambulatory from triage. Alert and oriented x4. C/o abdominal pain and cold symptoms, denies sick contacts. Respirations even and unlabored.

## 2023-12-30 NOTE — ED ADULT NURSE NOTE - CAS DISCH TRANSFER METHOD
Discharge home with mom in car seat  Continue  care at home   Follow up with PMD in 1-2 days, or earlier if problems develop ( fever, weight loss, jaundice).   [facility] ER available if PCP is not available Private car

## 2023-12-31 VITALS
HEART RATE: 91 BPM | RESPIRATION RATE: 18 BRPM | DIASTOLIC BLOOD PRESSURE: 72 MMHG | OXYGEN SATURATION: 98 % | SYSTOLIC BLOOD PRESSURE: 148 MMHG | TEMPERATURE: 98 F

## 2023-12-31 LAB
ALBUMIN SERPL ELPH-MCNC: 3.4 G/DL — SIGNIFICANT CHANGE UP (ref 3.3–5)
ALBUMIN SERPL ELPH-MCNC: 3.4 G/DL — SIGNIFICANT CHANGE UP (ref 3.3–5)
ALP SERPL-CCNC: 95 U/L — SIGNIFICANT CHANGE UP (ref 40–120)
ALP SERPL-CCNC: 95 U/L — SIGNIFICANT CHANGE UP (ref 40–120)
ALT FLD-CCNC: 16 U/L — SIGNIFICANT CHANGE UP (ref 12–78)
ALT FLD-CCNC: 16 U/L — SIGNIFICANT CHANGE UP (ref 12–78)
ANION GAP SERPL CALC-SCNC: 6 MMOL/L — SIGNIFICANT CHANGE UP (ref 5–17)
ANION GAP SERPL CALC-SCNC: 6 MMOL/L — SIGNIFICANT CHANGE UP (ref 5–17)
APTT BLD: 28 SEC — SIGNIFICANT CHANGE UP (ref 24.5–35.6)
APTT BLD: 28 SEC — SIGNIFICANT CHANGE UP (ref 24.5–35.6)
AST SERPL-CCNC: 11 U/L — LOW (ref 15–37)
AST SERPL-CCNC: 11 U/L — LOW (ref 15–37)
BASOPHILS # BLD AUTO: 0.08 K/UL — SIGNIFICANT CHANGE UP (ref 0–0.2)
BASOPHILS # BLD AUTO: 0.08 K/UL — SIGNIFICANT CHANGE UP (ref 0–0.2)
BASOPHILS NFR BLD AUTO: 0.8 % — SIGNIFICANT CHANGE UP (ref 0–2)
BASOPHILS NFR BLD AUTO: 0.8 % — SIGNIFICANT CHANGE UP (ref 0–2)
BILIRUB SERPL-MCNC: 0.6 MG/DL — SIGNIFICANT CHANGE UP (ref 0.2–1.2)
BILIRUB SERPL-MCNC: 0.6 MG/DL — SIGNIFICANT CHANGE UP (ref 0.2–1.2)
BUN SERPL-MCNC: 14 MG/DL — SIGNIFICANT CHANGE UP (ref 7–23)
BUN SERPL-MCNC: 14 MG/DL — SIGNIFICANT CHANGE UP (ref 7–23)
CALCIUM SERPL-MCNC: 8.4 MG/DL — LOW (ref 8.5–10.1)
CALCIUM SERPL-MCNC: 8.4 MG/DL — LOW (ref 8.5–10.1)
CHLORIDE SERPL-SCNC: 105 MMOL/L — SIGNIFICANT CHANGE UP (ref 96–108)
CHLORIDE SERPL-SCNC: 105 MMOL/L — SIGNIFICANT CHANGE UP (ref 96–108)
CO2 SERPL-SCNC: 25 MMOL/L — SIGNIFICANT CHANGE UP (ref 22–31)
CO2 SERPL-SCNC: 25 MMOL/L — SIGNIFICANT CHANGE UP (ref 22–31)
CREAT SERPL-MCNC: 0.52 MG/DL — SIGNIFICANT CHANGE UP (ref 0.5–1.3)
CREAT SERPL-MCNC: 0.52 MG/DL — SIGNIFICANT CHANGE UP (ref 0.5–1.3)
EGFR: 90 ML/MIN/1.73M2 — SIGNIFICANT CHANGE UP
EGFR: 90 ML/MIN/1.73M2 — SIGNIFICANT CHANGE UP
EOSINOPHIL # BLD AUTO: 0.17 K/UL — SIGNIFICANT CHANGE UP (ref 0–0.5)
EOSINOPHIL # BLD AUTO: 0.17 K/UL — SIGNIFICANT CHANGE UP (ref 0–0.5)
EOSINOPHIL NFR BLD AUTO: 1.7 % — SIGNIFICANT CHANGE UP (ref 0–6)
EOSINOPHIL NFR BLD AUTO: 1.7 % — SIGNIFICANT CHANGE UP (ref 0–6)
FLUAV AG NPH QL: SIGNIFICANT CHANGE UP
FLUAV AG NPH QL: SIGNIFICANT CHANGE UP
FLUBV AG NPH QL: SIGNIFICANT CHANGE UP
FLUBV AG NPH QL: SIGNIFICANT CHANGE UP
GLUCOSE SERPL-MCNC: 116 MG/DL — HIGH (ref 70–99)
GLUCOSE SERPL-MCNC: 116 MG/DL — HIGH (ref 70–99)
HCT VFR BLD CALC: 36.3 % — SIGNIFICANT CHANGE UP (ref 34.5–45)
HCT VFR BLD CALC: 36.3 % — SIGNIFICANT CHANGE UP (ref 34.5–45)
HGB BLD-MCNC: 12 G/DL — SIGNIFICANT CHANGE UP (ref 11.5–15.5)
HGB BLD-MCNC: 12 G/DL — SIGNIFICANT CHANGE UP (ref 11.5–15.5)
IMM GRANULOCYTES NFR BLD AUTO: 0.4 % — SIGNIFICANT CHANGE UP (ref 0–0.9)
IMM GRANULOCYTES NFR BLD AUTO: 0.4 % — SIGNIFICANT CHANGE UP (ref 0–0.9)
INR BLD: 1.03 RATIO — SIGNIFICANT CHANGE UP (ref 0.85–1.18)
INR BLD: 1.03 RATIO — SIGNIFICANT CHANGE UP (ref 0.85–1.18)
LACTATE SERPL-SCNC: 1 MMOL/L — SIGNIFICANT CHANGE UP (ref 0.7–2)
LACTATE SERPL-SCNC: 1 MMOL/L — SIGNIFICANT CHANGE UP (ref 0.7–2)
LIDOCAIN IGE QN: 23 U/L — SIGNIFICANT CHANGE UP (ref 13–75)
LIDOCAIN IGE QN: 23 U/L — SIGNIFICANT CHANGE UP (ref 13–75)
LYMPHOCYTES # BLD AUTO: 1.41 K/UL — SIGNIFICANT CHANGE UP (ref 1–3.3)
LYMPHOCYTES # BLD AUTO: 1.41 K/UL — SIGNIFICANT CHANGE UP (ref 1–3.3)
LYMPHOCYTES # BLD AUTO: 14.2 % — SIGNIFICANT CHANGE UP (ref 13–44)
LYMPHOCYTES # BLD AUTO: 14.2 % — SIGNIFICANT CHANGE UP (ref 13–44)
MCHC RBC-ENTMCNC: 27.1 PG — SIGNIFICANT CHANGE UP (ref 27–34)
MCHC RBC-ENTMCNC: 27.1 PG — SIGNIFICANT CHANGE UP (ref 27–34)
MCHC RBC-ENTMCNC: 33.1 GM/DL — SIGNIFICANT CHANGE UP (ref 32–36)
MCHC RBC-ENTMCNC: 33.1 GM/DL — SIGNIFICANT CHANGE UP (ref 32–36)
MCV RBC AUTO: 81.9 FL — SIGNIFICANT CHANGE UP (ref 80–100)
MCV RBC AUTO: 81.9 FL — SIGNIFICANT CHANGE UP (ref 80–100)
MONOCYTES # BLD AUTO: 0.92 K/UL — HIGH (ref 0–0.9)
MONOCYTES # BLD AUTO: 0.92 K/UL — HIGH (ref 0–0.9)
MONOCYTES NFR BLD AUTO: 9.2 % — SIGNIFICANT CHANGE UP (ref 2–14)
MONOCYTES NFR BLD AUTO: 9.2 % — SIGNIFICANT CHANGE UP (ref 2–14)
NEUTROPHILS # BLD AUTO: 7.33 K/UL — SIGNIFICANT CHANGE UP (ref 1.8–7.4)
NEUTROPHILS # BLD AUTO: 7.33 K/UL — SIGNIFICANT CHANGE UP (ref 1.8–7.4)
NEUTROPHILS NFR BLD AUTO: 73.7 % — SIGNIFICANT CHANGE UP (ref 43–77)
NEUTROPHILS NFR BLD AUTO: 73.7 % — SIGNIFICANT CHANGE UP (ref 43–77)
NRBC # BLD: 0 /100 WBCS — SIGNIFICANT CHANGE UP (ref 0–0)
NRBC # BLD: 0 /100 WBCS — SIGNIFICANT CHANGE UP (ref 0–0)
PLATELET # BLD AUTO: 226 K/UL — SIGNIFICANT CHANGE UP (ref 150–400)
PLATELET # BLD AUTO: 226 K/UL — SIGNIFICANT CHANGE UP (ref 150–400)
POTASSIUM SERPL-MCNC: 3.8 MMOL/L — SIGNIFICANT CHANGE UP (ref 3.5–5.3)
POTASSIUM SERPL-MCNC: 3.8 MMOL/L — SIGNIFICANT CHANGE UP (ref 3.5–5.3)
POTASSIUM SERPL-SCNC: 3.8 MMOL/L — SIGNIFICANT CHANGE UP (ref 3.5–5.3)
POTASSIUM SERPL-SCNC: 3.8 MMOL/L — SIGNIFICANT CHANGE UP (ref 3.5–5.3)
PROT SERPL-MCNC: 6.2 G/DL — SIGNIFICANT CHANGE UP (ref 6–8.3)
PROT SERPL-MCNC: 6.2 G/DL — SIGNIFICANT CHANGE UP (ref 6–8.3)
PROTHROM AB SERPL-ACNC: 12 SEC — SIGNIFICANT CHANGE UP (ref 9.5–13)
PROTHROM AB SERPL-ACNC: 12 SEC — SIGNIFICANT CHANGE UP (ref 9.5–13)
RBC # BLD: 4.43 M/UL — SIGNIFICANT CHANGE UP (ref 3.8–5.2)
RBC # BLD: 4.43 M/UL — SIGNIFICANT CHANGE UP (ref 3.8–5.2)
RBC # FLD: 17 % — HIGH (ref 10.3–14.5)
RBC # FLD: 17 % — HIGH (ref 10.3–14.5)
RSV RNA NPH QL NAA+NON-PROBE: DETECTED
RSV RNA NPH QL NAA+NON-PROBE: DETECTED
SARS-COV-2 RNA SPEC QL NAA+PROBE: SIGNIFICANT CHANGE UP
SARS-COV-2 RNA SPEC QL NAA+PROBE: SIGNIFICANT CHANGE UP
SODIUM SERPL-SCNC: 136 MMOL/L — SIGNIFICANT CHANGE UP (ref 135–145)
SODIUM SERPL-SCNC: 136 MMOL/L — SIGNIFICANT CHANGE UP (ref 135–145)
WBC # BLD: 9.95 K/UL — SIGNIFICANT CHANGE UP (ref 3.8–10.5)
WBC # BLD: 9.95 K/UL — SIGNIFICANT CHANGE UP (ref 3.8–10.5)
WBC # FLD AUTO: 9.95 K/UL — SIGNIFICANT CHANGE UP (ref 3.8–10.5)
WBC # FLD AUTO: 9.95 K/UL — SIGNIFICANT CHANGE UP (ref 3.8–10.5)

## 2023-12-31 PROCEDURE — 85610 PROTHROMBIN TIME: CPT

## 2023-12-31 PROCEDURE — 99284 EMERGENCY DEPT VISIT MOD MDM: CPT | Mod: 25

## 2023-12-31 PROCEDURE — 96374 THER/PROPH/DIAG INJ IV PUSH: CPT

## 2023-12-31 PROCEDURE — 87637 SARSCOV2&INF A&B&RSV AMP PRB: CPT

## 2023-12-31 PROCEDURE — 71045 X-RAY EXAM CHEST 1 VIEW: CPT

## 2023-12-31 PROCEDURE — 74177 CT ABD & PELVIS W/CONTRAST: CPT | Mod: MA

## 2023-12-31 PROCEDURE — 85025 COMPLETE CBC W/AUTO DIFF WBC: CPT

## 2023-12-31 PROCEDURE — 36415 COLL VENOUS BLD VENIPUNCTURE: CPT

## 2023-12-31 PROCEDURE — 83690 ASSAY OF LIPASE: CPT

## 2023-12-31 PROCEDURE — 96375 TX/PRO/DX INJ NEW DRUG ADDON: CPT

## 2023-12-31 PROCEDURE — 71045 X-RAY EXAM CHEST 1 VIEW: CPT | Mod: 26

## 2023-12-31 PROCEDURE — 74177 CT ABD & PELVIS W/CONTRAST: CPT | Mod: 26,MA

## 2023-12-31 PROCEDURE — 85730 THROMBOPLASTIN TIME PARTIAL: CPT

## 2023-12-31 PROCEDURE — 80053 COMPREHEN METABOLIC PANEL: CPT

## 2023-12-31 PROCEDURE — 83605 ASSAY OF LACTIC ACID: CPT

## 2023-12-31 RX ORDER — SODIUM CHLORIDE 9 MG/ML
1000 INJECTION INTRAMUSCULAR; INTRAVENOUS; SUBCUTANEOUS ONCE
Refills: 0 | Status: COMPLETED | OUTPATIENT
Start: 2023-12-31 | End: 2023-12-31

## 2023-12-31 RX ORDER — ACETAMINOPHEN 500 MG
1000 TABLET ORAL ONCE
Refills: 0 | Status: COMPLETED | OUTPATIENT
Start: 2023-12-31 | End: 2023-12-31

## 2023-12-31 RX ORDER — ONDANSETRON 8 MG/1
4 TABLET, FILM COATED ORAL ONCE
Refills: 0 | Status: COMPLETED | OUTPATIENT
Start: 2023-12-31 | End: 2023-12-31

## 2023-12-31 RX ADMIN — Medication 400 MILLIGRAM(S): at 03:30

## 2023-12-31 RX ADMIN — ONDANSETRON 4 MILLIGRAM(S): 8 TABLET, FILM COATED ORAL at 03:31

## 2023-12-31 RX ADMIN — SODIUM CHLORIDE 1000 MILLILITER(S): 9 INJECTION INTRAMUSCULAR; INTRAVENOUS; SUBCUTANEOUS at 03:30

## 2023-12-31 NOTE — ED PROVIDER NOTE - PATIENT PORTAL LINK FT
You can access the FollowMyHealth Patient Portal offered by [facility] by registering at the following website: [email or web address]. By joining [employer]’s FollowMyHealth portal, you will also be able to view your health information using other applications (apps) compatible with our system. You can access the FollowMyHealth Patient Portal offered by [facility] by registering at the following website: [email or web address]. By joining [employer]’s FollowMyHealth portal, you will also be able to view your health information using other applications (apps) compatible with our system.

## 2023-12-31 NOTE — CONSULT NOTE ADULT - SUBJECTIVE AND OBJECTIVE BOX
SURGERY PA CONSULT NOTE:    CHIEF COMPLAINT:  Patient is a 88y old  Female who presents with a chief complaint of flu-like symptoms    HPI:  Patient is a an 88 year old female with PMHx multiple myeloma (on chemo), HTN, s/p Hiatal hernia repair (with Dr. Britt & Dr. Mas, 10/22), presenting to [facility] ED with flu-like symptoms.  History provided with assistance by daughter at bedside.  2 days ago, patient began feeling unwell with rhinorrhea, cough, nausea, and chills.  Yesterday afternoon, Patient experienced sharp epigastric abdominal pain.  Since abdominal pain started patient has had loss of appetite.  She is unsure of her last BM.  Of note, patient had seen her PCP last year who found a right inguinal hernia, she was scheduled to have imaging done to assess hernia next week.      PAST MEDICAL HISTORY:  PAST MEDICAL & SURGICAL HISTORY:  Hypertension      GERD (gastroesophageal reflux disease)      Ankle fracture, left      Gastro-esophageal reflux disease without esophagitis      Benign essential hypertension      Chronic cough      Chronic low back pain      Dyspnea on exertion      Back pain with sciatica  Sciatica affects LEFT Side      Regurgitation of food      Rib fracture  right May 2023      Fall at home  March 2023      Multiple myeloma      Anemia of chronic disease      Hyponatremia      Lumbar compression fracture      Ankle fracture, left  1980 (Hardware placed)      H/O endoscopy      H/O colonoscopy      History of tonsillectomy  As a child      History of repair of hiatal hernia  Oct 2022          PAST SURGICAL HISTORY:    REVIEW OF SYSTEMS:  General/Constitutional: Chills  HEENT: rhinorrhea   Neck: Denies neck pain/stiffness, denies swelling/lumps/hoarseness   Lymphatic: Denies lumps or swelling in the axillae, groin, or neck bilaterally   Respiratory: cough  Cardiac: Denies chest pain, palpitations  Abdomen: nausea, abdominal pain   Extremities: Denies sores, swelling, discoloration bilat UE/LE  Genitourinary: Denies urinary issues or complaints, denies dysuria/hematuria  Neuro: Denies weakness, paraesthesias, paralysis, syncope, loss of vision  Skin: Denies pruritus, pain, rashes  Psych: Denies hallucinations, visual disturbances, or depression    MEDICATIONS:  Home Medications:  acyclovir 400 mg oral tablet: 1 orally 2 times a day (30 Dec 2023 20:22)  dexAMETHasone 4 mg oral tablet: 5 tab(s) orally once a day Every Monday with chemo (30 Dec 2023 20:22)  Lenalidomide 25 mg oral capsule: 1 orally once a day (30 Dec 2023 20:22)  losartan 25 mg oral tablet: 0.5 tab(s) orally once a day (30 Dec 2023 20:22)  morphine 30 mg/24 hours oral capsule, extended release: 1 orally 2 times a day as needed for  severe pain (30 Dec 2023 20:22)  Percocet 5 mg-325 mg oral tablet: 1 orally 2 times a day as needed for  severe pain (30 Dec 2023 20:22)  polyethylene glycol 3350 oral powder for reconstitution: 17 gram(s) orally once a day (28 Jul 2023 16:51)  senna leaf extract oral tablet: 2 tab(s) orally once a day (at bedtime) (28 Jul 2023 16:51)    MEDICATIONS  (STANDING):    MEDICATIONS  (PRN):      ALLERGIES:  Allergies    No Known Allergies    Intolerances        SOCIAL HISTORY:  Social History:    Smoking: Yes [ ]  No [x]   ______pk yrs  ETOH  Yes [ ]  No [x]  Social [ ]  DRUGS:  Yes [ ]  No [x]  if so what______________    FAMILY HISTORY:  FAMILY HISTORY:  No pertinent family history in first degree relatives        VITAL SIGNS:  Vital Signs Last 24 Hrs  T(C): 36.5 (30 Dec 2023 20:19), Max: 36.5 (30 Dec 2023 20:19)  T(F): 97.7 (30 Dec 2023 20:19), Max: 97.7 (30 Dec 2023 20:19)  HR: 107 (30 Dec 2023 20:19) (107 - 107)  BP: 166/75 (30 Dec 2023 20:19) (166/75 - 166/75)  BP(mean): --  RR: 18 (30 Dec 2023 20:19) (18 - 18)  SpO2: 98% (30 Dec 2023 20:19) (98% - 98%)    Parameters below as of 30 Dec 2023 20:19  Patient On (Oxygen Delivery Method): room air        PHYSICAL EXAM:  General: No acute distress,   HEENT:  NC/AC. Rhinorrhea noted.   Chest: Lungs are clear to P&A, no wheezing, no rales, no ronchi, with good inspiratory effort  Heart: Heart rhythm regular, no murmurs  Abdomen: Soft, mild tenderness in epigastrum, good bowel sounds present in all four quadrants.  No guarding, rebound, and no peritoneal signs.  No evidence of hepatosplenomegaly.  small right inguinal hernia noted, without overlying erythema or skin changes or further signs of ischemic bowel, hernia reducible.    Extremity: No swelling, or open sores, no gross deformities,  good range of motion, 2+ peripheral pulses bilat UE/LE, no edema,  negative Rebecca's sign, no lymphadenopathy  Neuro: Alert and oriented x3, motor and sensory intact  Skin: Good color, turgor, texture with no gross lesions, no eruptions, no rashes, no subcutaneous nodules and normal temperature.     LABS:                        12.0   9.95  )-----------( 226      ( 31 Dec 2023 03:35 )             36.3     12-31    136  |  105  |  14  ----------------------------<  116<H>  3.8   |  25  |  0.52    Ca    8.4<L>      31 Dec 2023 03:35    TPro  6.2  /  Alb  3.4  /  TBili  0.6  /  DBili  x   /  AST  11<L>  /  ALT  16  /  AlkPhos  95  12-31    PT/INR - ( 31 Dec 2023 03:35 )   PT: 12.0 sec;   INR: 1.03 ratio         PTT - ( 31 Dec 2023 03:35 )  PTT:28.0 sec  Urinalysis Basic - ( 31 Dec 2023 03:35 )    Color: x / Appearance: x / SG: x / pH: x  Gluc: 116 mg/dL / Ketone: x  / Bili: x / Urobili: x   Blood: x / Protein: x / Nitrite: x   Leuk Esterase: x / RBC: x / WBC x   Sq Epi: x / Non Sq Epi: x / Bacteria: x      LIVER FUNCTIONS - ( 31 Dec 2023 03:35 )  Alb: 3.4 g/dL / Pro: 6.2 g/dL / ALK PHOS: 95 U/L / ALT: 16 U/L / AST: 11 U/L / GGT: x               RADIOLOGY & ADDITIONAL STUDIES:  < from: CT Abdomen and Pelvis w/ IV Cont (12.31.23 @ 03:50) >    ACC: [id number] EXAM:  CT ABDOMEN AND PELVIS IC   ORDERED BY: YOLANDA MOROCHO     PROCEDURE DATE:  12/31/2023          INTERPRETATION:  CLINICAL INFORMATION: Mid abdominal pain x1 day    COMPARISON: CT abdomen/pelvis 7/26/2023    CONTRAST/COMPLICATIONS:  IV Contrast: Omnipaque 350  90 cc administered   10 cc discarded  Oral Contrast: NONE  Complications: None reported at time of study completion    PROCEDURE:  CT of the Abdomen and Pelvis was performed.  Sagittal and coronal reformats were performed.    FINDINGS:  LOWER CHEST: Aortic valve leaflet, mitral annular, and coronary artery   calcifications. Basilar atelectasis versus scarring.    LIVER: Within normal limits.  BILE DUCTS: Normal caliber.  GALLBLADDER: Within normal limits.  SPLEEN: Within normal limits.  PANCREAS: Within normal limits.  ADRENALS: Within normal limits.  KIDNEYS/URETERS: Within normal limits.    BLADDER: Within normal limits.  REPRODUCTIVE ORGANS: Uterus and adnexa within normal limits.    BOWEL: Postsurgical changes of the gastroesophageal junction. No bowel   obstruction. Scattered colonic diverticula without diverticulitis.   Appendix is normal.  PERITONEUM: No ascites.  VESSELS: Atherosclerotic changes.  RETROPERITONEUM/LYMPH NODES: No lymphadenopathy.  ABDOMINAL WALL: Right inguinal hernia containing nonobstructed segment of   distal ileum with adjacent fluid, new from prior.  BONES: Degenerative changes. Decreased bone mineral density with   increased height loss at T12  (greater than 50%). Stable chronic   compression deformities of T11, L1, and L4.    IMPRESSION:  Interval increased height loss at T12, recommend correlation for acute   fracture.    Right inguinal hernia containing nonobstructed segment of distal ileum.   New adjacent fluid, for which strangulation/ischemia is not excluded.   Recommend surgical consultation.       TONYA BURCIAGA MD; Attending Radiologist  This document has been electronically signed. Dec 31 2023  4:13AM    < end of copied text >    ASSESSMENT:  88 year old female with PMHx multiple myeloma (on chemo), HTN, s/p Hiatal hernia repair (with Dr. Britt & Dr. Mas, 10/22), presenting to [facility] ED with flu-like symptoms and found to be positive for RSV.    CT scan performed showing right inguinal hernia with non-obstructed segment of distal ileum and fluid whereby incarceration or ischemic bowel cannot be excluded.  VSS.  Labs unremarkable, lactate 1.0.  Abdominal exam with small right inguinal hernia, reducible by surgery team.      PLAN:  - Patient without complaints related to right inguinal hernia at time of examination and no signs of ischemia or incarceration given normal lactate and reducible hernia  - Abdominal pain possibly related to underlying viral illness  - No acute general surgical intervention indicated at this time  - Patient may follow up with Dr. Wilkerson for further evaluation of hernia as outpatient  - Educated daughter on signs of hernia recurrence and reduction techniques  - Remainder of care per primary team   - Discussed case w/ Dr. Wilkerson  SURGERY PA CONSULT NOTE:    CHIEF COMPLAINT:  Patient is a 88y old  Female who presents with a chief complaint of flu-like symptoms    HPI:  Patient is a an 88 year old female with PMHx multiple myeloma (on chemo), HTN, s/p Hiatal hernia repair (with Dr. Britt & Dr. Mas, 10/22), presenting to [facility] ED with flu-like symptoms.  History provided with assistance by daughter at bedside.  2 days ago, patient began feeling unwell with rhinorrhea, cough, nausea, and chills.  Yesterday afternoon, Patient experienced sharp epigastric abdominal pain.  Since abdominal pain started patient has had loss of appetite.  She is unsure of her last BM.  Of note, patient had seen her PCP last year who found a right inguinal hernia, she was scheduled to have imaging done to assess hernia next week.      PAST MEDICAL HISTORY:  PAST MEDICAL & SURGICAL HISTORY:  Hypertension      GERD (gastroesophageal reflux disease)      Ankle fracture, left      Gastro-esophageal reflux disease without esophagitis      Benign essential hypertension      Chronic cough      Chronic low back pain      Dyspnea on exertion      Back pain with sciatica  Sciatica affects LEFT Side      Regurgitation of food      Rib fracture  right May 2023      Fall at home  March 2023      Multiple myeloma      Anemia of chronic disease      Hyponatremia      Lumbar compression fracture      Ankle fracture, left  1980 (Hardware placed)      H/O endoscopy      H/O colonoscopy      History of tonsillectomy  As a child      History of repair of hiatal hernia  Oct 2022          PAST SURGICAL HISTORY:    REVIEW OF SYSTEMS:  General/Constitutional: Chills  HEENT: rhinorrhea   Neck: Denies neck pain/stiffness, denies swelling/lumps/hoarseness   Lymphatic: Denies lumps or swelling in the axillae, groin, or neck bilaterally   Respiratory: cough  Cardiac: Denies chest pain, palpitations  Abdomen: nausea, abdominal pain   Extremities: Denies sores, swelling, discoloration bilat UE/LE  Genitourinary: Denies urinary issues or complaints, denies dysuria/hematuria  Neuro: Denies weakness, paraesthesias, paralysis, syncope, loss of vision  Skin: Denies pruritus, pain, rashes  Psych: Denies hallucinations, visual disturbances, or depression    MEDICATIONS:  Home Medications:  acyclovir 400 mg oral tablet: 1 orally 2 times a day (30 Dec 2023 20:22)  dexAMETHasone 4 mg oral tablet: 5 tab(s) orally once a day Every Monday with chemo (30 Dec 2023 20:22)  Lenalidomide 25 mg oral capsule: 1 orally once a day (30 Dec 2023 20:22)  losartan 25 mg oral tablet: 0.5 tab(s) orally once a day (30 Dec 2023 20:22)  morphine 30 mg/24 hours oral capsule, extended release: 1 orally 2 times a day as needed for  severe pain (30 Dec 2023 20:22)  Percocet 5 mg-325 mg oral tablet: 1 orally 2 times a day as needed for  severe pain (30 Dec 2023 20:22)  polyethylene glycol 3350 oral powder for reconstitution: 17 gram(s) orally once a day (28 Jul 2023 16:51)  senna leaf extract oral tablet: 2 tab(s) orally once a day (at bedtime) (28 Jul 2023 16:51)    MEDICATIONS  (STANDING):    MEDICATIONS  (PRN):      ALLERGIES:  Allergies    No Known Allergies    Intolerances        SOCIAL HISTORY:  Social History:    Smoking: Yes [ ]  No [x]   ______pk yrs  ETOH  Yes [ ]  No [x]  Social [ ]  DRUGS:  Yes [ ]  No [x]  if so what______________    FAMILY HISTORY:  FAMILY HISTORY:  No pertinent family history in first degree relatives        VITAL SIGNS:  Vital Signs Last 24 Hrs  T(C): 36.5 (30 Dec 2023 20:19), Max: 36.5 (30 Dec 2023 20:19)  T(F): 97.7 (30 Dec 2023 20:19), Max: 97.7 (30 Dec 2023 20:19)  HR: 107 (30 Dec 2023 20:19) (107 - 107)  BP: 166/75 (30 Dec 2023 20:19) (166/75 - 166/75)  BP(mean): --  RR: 18 (30 Dec 2023 20:19) (18 - 18)  SpO2: 98% (30 Dec 2023 20:19) (98% - 98%)    Parameters below as of 30 Dec 2023 20:19  Patient On (Oxygen Delivery Method): room air        PHYSICAL EXAM:  General: No acute distress,   HEENT:  NC/AC. Rhinorrhea noted.   Chest: Lungs are clear to P&A, no wheezing, no rales, no ronchi, with good inspiratory effort  Heart: Heart rhythm regular, no murmurs  Abdomen: Soft, mild tenderness in epigastrum, good bowel sounds present in all four quadrants.  No guarding, rebound, and no peritoneal signs.  No evidence of hepatosplenomegaly.  small right inguinal hernia noted, without overlying erythema or skin changes or further signs of ischemic bowel, hernia reducible.    Extremity: No swelling, or open sores, no gross deformities,  good range of motion, 2+ peripheral pulses bilat UE/LE, no edema,  negative Rebecca's sign, no lymphadenopathy  Neuro: Alert and oriented x3, motor and sensory intact  Skin: Good color, turgor, texture with no gross lesions, no eruptions, no rashes, no subcutaneous nodules and normal temperature.     LABS:                        12.0   9.95  )-----------( 226      ( 31 Dec 2023 03:35 )             36.3     12-31    136  |  105  |  14  ----------------------------<  116<H>  3.8   |  25  |  0.52    Ca    8.4<L>      31 Dec 2023 03:35    TPro  6.2  /  Alb  3.4  /  TBili  0.6  /  DBili  x   /  AST  11<L>  /  ALT  16  /  AlkPhos  95  12-31    PT/INR - ( 31 Dec 2023 03:35 )   PT: 12.0 sec;   INR: 1.03 ratio         PTT - ( 31 Dec 2023 03:35 )  PTT:28.0 sec  Urinalysis Basic - ( 31 Dec 2023 03:35 )    Color: x / Appearance: x / SG: x / pH: x  Gluc: 116 mg/dL / Ketone: x  / Bili: x / Urobili: x   Blood: x / Protein: x / Nitrite: x   Leuk Esterase: x / RBC: x / WBC x   Sq Epi: x / Non Sq Epi: x / Bacteria: x      LIVER FUNCTIONS - ( 31 Dec 2023 03:35 )  Alb: 3.4 g/dL / Pro: 6.2 g/dL / ALK PHOS: 95 U/L / ALT: 16 U/L / AST: 11 U/L / GGT: x               RADIOLOGY & ADDITIONAL STUDIES:  < from: CT Abdomen and Pelvis w/ IV Cont (12.31.23 @ 03:50) >    ACC: [id number] EXAM:  CT ABDOMEN AND PELVIS IC   ORDERED BY: YOLANDA MOROCHO     PROCEDURE DATE:  12/31/2023          INTERPRETATION:  CLINICAL INFORMATION: Mid abdominal pain x1 day    COMPARISON: CT abdomen/pelvis 7/26/2023    CONTRAST/COMPLICATIONS:  IV Contrast: Omnipaque 350  90 cc administered   10 cc discarded  Oral Contrast: NONE  Complications: None reported at time of study completion    PROCEDURE:  CT of the Abdomen and Pelvis was performed.  Sagittal and coronal reformats were performed.    FINDINGS:  LOWER CHEST: Aortic valve leaflet, mitral annular, and coronary artery   calcifications. Basilar atelectasis versus scarring.    LIVER: Within normal limits.  BILE DUCTS: Normal caliber.  GALLBLADDER: Within normal limits.  SPLEEN: Within normal limits.  PANCREAS: Within normal limits.  ADRENALS: Within normal limits.  KIDNEYS/URETERS: Within normal limits.    BLADDER: Within normal limits.  REPRODUCTIVE ORGANS: Uterus and adnexa within normal limits.    BOWEL: Postsurgical changes of the gastroesophageal junction. No bowel   obstruction. Scattered colonic diverticula without diverticulitis.   Appendix is normal.  PERITONEUM: No ascites.  VESSELS: Atherosclerotic changes.  RETROPERITONEUM/LYMPH NODES: No lymphadenopathy.  ABDOMINAL WALL: Right inguinal hernia containing nonobstructed segment of   distal ileum with adjacent fluid, new from prior.  BONES: Degenerative changes. Decreased bone mineral density with   increased height loss at T12  (greater than 50%). Stable chronic   compression deformities of T11, L1, and L4.    IMPRESSION:  Interval increased height loss at T12, recommend correlation for acute   fracture.    Right inguinal hernia containing nonobstructed segment of distal ileum.   New adjacent fluid, for which strangulation/ischemia is not excluded.   Recommend surgical consultation.       TONYA BURCIAGA MD; Attending Radiologist  This document has been electronically signed. Dec 31 2023  4:13AM    < end of copied text >    ASSESSMENT:  88 year old female with PMHx multiple myeloma (on chemo), HTN, s/p Hiatal hernia repair (with Dr. Britt & Dr. Mas, 10/22), presenting to [facility] ED with flu-like symptoms and found to be positive for RSV.    CT scan performed showing right inguinal hernia with non-obstructed segment of distal ileum and fluid whereby incarceration or ischemic bowel cannot be excluded.  VSS.  Labs unremarkable, lactate 1.0.  Abdominal exam with small right inguinal hernia, reducible by surgery team.      PLAN:  - Patient without complaints related to right inguinal hernia at time of examination and no signs of ischemia or incarceration given normal lactate and reducible hernia  - Abdominal pain possibly related to underlying viral illness  - No acute general surgical intervention indicated at this time  - Patient may follow up with Dr. Wilkerson for further evaluation of hernia as outpatient  - Educated daughter on signs of hernia recurrence and reduction techniques  - Remainder of care per primary team   - Discussed case w/ Dr. Wilkerson

## 2023-12-31 NOTE — ED PROVIDER NOTE - CLINICAL SUMMARY MEDICAL DECISION MAKING FREE TEXT BOX
88-year-old female history of multiple myeloma complaining of flulike symptoms for 1 day with associated cough and now having mid abdominal pain.  Admits to some nausea no vomiting.  Denies any fever or chills.  No medications taken for this.  Admits to drinking and eating less today.    r/o viral etiology, r/o obstruction, colitis, labs, CT a/p, viral swab, IV fluids, antiemetics, analgesia

## 2023-12-31 NOTE — ED PROVIDER NOTE - PHYSICAL EXAMINATION
Gen: Alert, NAD  Head/eyes: NC/AT, PERRL  ENT: airway patent  Neck: supple  Pulm/lung: Bilateral clear BS  CV/heart: RRR  GI/Abd: soft, +ttp mid abdomen/ND, +BS, no guarding/rebound tenderness  Musculoskeletal: no edema/erythema/cyanosis  Skin: no rash  Neuro: AAOx3, grossly intact

## 2023-12-31 NOTE — ED PROVIDER NOTE - NSFOLLOWUPINSTRUCTIONS_ED_ALL_ED_FT
1) Follow-up with your Primary Medical Doctor and Dr. Wilkerson. Call today / next business day for prompt follow-up.  2) Return to Emergency room for any worsening or persistent pain, weakness, fever, or any other concerning symptoms.  3) See attached instruction sheets for additional information, including information regarding signs and symptoms to look out for, reasons to seek immediate care and other important instructions.  4) Follow-up with any specialists as discussed / noted as well.    Respiratory Syncytial Virus Infection, Adult    Respiratory syncytial virus (RSV) infection is an infection caused by RSV, a common virus. This virus is similar to viruses that cause the common cold and the flu. RSV infection can affect the nose, throat, windpipe, and lungs (respiratory system). When the infection is severe, it can cause:  Bronchiolitis. This condition causes inflammation of the air passages in the lungs (bronchioles).  Pneumonia. This condition causes inflammation of the air sacs in the lungs.  RSV infection spreads from person to person (is contagious) through droplets from coughs and sneezes (respiratory secretions). This condition is rarely serious when it occurs in adults.    What are the causes?  This condition is caused by contact with RSV. This can happen by:  Breathing respiratory secretions from someone who has the infection.  Touching something that has been exposed to the virus (is contaminated) and then touching your mouth, nose, or eyes.  Coming in close contact with someone who has this infection. This may happen if you:  Hug or kiss.  Shake or hold hands.  Eat or drink using the same dishes or utensils.  What increases the risk?  The following factors may make you more likely to develop this condition:  Being 65 years of age or older.  Having certain health conditions, including:  A long-term (chronic) lung condition, such as chronic obstructive pulmonary disease (COPD).  An immune system that is weak. This is your body's defense system.  Down syndrome.  Heart disease.  Working in a hospital or other health care facility.  Living in a long-term health care facility.  RSV infections are most common from the months of November to April, but they can happen any time of year.    What are the signs or symptoms?  Symptoms of this condition include:  Having a runny nose.  Coughing. You may have a cough that brings up mucus (productive cough).  Sneezing.  Having a fever.  Wanting to eat less than usual.  Breathing loudly (wheezing).  Having shortness of breath.  Having fluid build up in the lungs (respiratory distress).  How is this diagnosed?  This condition may be diagnosed based on:  Your symptoms.  Your medical history.  A physical exam.  A chest X-ray to rule out pneumonia.  Blood tests or tests of mucus from your lungs (sputum). These tests may be done for older adults.  A test of a sample of your respiratory secretions.  How is this treated?  In most cases, the RSV infection will go away after 1–2 weeks of caring for yourself at home.    Sometimes, RSV infection is severe and can cause bronchiolitis or pneumonia. If you develop one or both of these conditions, you may need to be treated in the hospital. You may be given:  Oxygen therapy.  Antiviral medicine.  Medicines to open your bronchioles (bronchodilators).  Follow these instructions at home:  Medicines    Take over-the-counter and prescription medicines only as told by your health care provider.  If you were prescribed an antiviral medicine, take it as told by your health care provider. Do not stop using the antiviral even if you start to feel better.  Lifestyle      Eat a healthy diet.  Do not drink alcohol.  Do not use any products that contain nicotine or tobacco, such as cigarettes, e-cigarettes, and chewing tobacco. If you need help quitting, ask your health care provider.  Rest at home until your symptoms go away.  Return to your normal activities as told by your health care provider. Ask your health care provider what activities are safe for you.  General instructions      Drink enough fluid to keep your urine pale yellow.  Gargle with a salt–water mixture 3–4 times a day or as needed. To make a salt–water mixture, completely dissolve ½–1 tsp (3–6 g) of salt in 1 cup (237 mL) of warm water.  Keep all follow-up visits as told by your health care provider. This is important.  How is this prevented?    To prevent catching and spreading RSV:  Wash your hands often with soap and water for at least 20 seconds. If soap and water are not available, use hand . Do not touch your face without first cleaning your hands.  Stay home if you have symptoms of the common cold or the flu.  Cover your nose and mouth when you cough or sneeze.  Avoid large groups of people.  Keep a safe distance of about 6 feet (1.8 m) from people who are coughing or sneezing.  Where to find more information  Centers for Disease Control and Prevention: www.cdc.gov  Contact a health care provider if:  Your symptoms get worse or have not changed after 2 weeks.  You have:  A fever.  Hot flashes, sweating, or chills that keep happening.  A cough that brings up much more mucus than usual.  A cough that brings up blood.  You feel:  Very tired (lethargic).  Confused.  Get help right away if:  You have increased or severe trouble breathing.  You lose consciousness.  These symptoms may represent a serious problem that is an emergency. Do not wait to see if the symptoms will go away. Get medical help right away. Call your local emergency services (911 in the [place] Do not drive yourself to the hospital.    Summary  Respiratory syncytial virus (RSV) infection is an infection caused by RSV, a common virus. RSV infection can affect the nose, throat, windpipe, and lungs (respiratory system).  When the infection is severe, it can cause bronchiolitis or pneumonia.  Take over-the-counter and prescription medicines only as told by your health care provider.  Contact a health care provider if your symptoms get worse or have not changed after 2 weeks.  This information is not intended to replace advice given to you by your health care provider. Make sure you discuss any questions you have with your health care provider.

## 2023-12-31 NOTE — ED PROVIDER NOTE - CARE PROVIDER_API CALL
[facility]  [address], [address]  [place] [address]  Phone: [phone]  Fax: [phone]  Follow Up Time:    [facility]  [address], [address]  [place] [address]  Phone: [phone]  Fax: [phone]  Follow Up Time:

## 2023-12-31 NOTE — ED PROVIDER NOTE - NSICDXPASTMEDICALHX_GEN_ALL_CORE_FT
PAST MEDICAL HISTORY:  Anemia of chronic disease     Ankle fracture, left     Back pain with sciatica Sciatica affects LEFT Side    Benign essential hypertension     Chronic cough     Chronic low back pain     Dyspnea on exertion     Fall at home March 2023    Gastro-esophageal reflux disease without esophagitis     GERD (gastroesophageal reflux disease)     Hypertension     Hyponatremia     Lumbar compression fracture     Multiple myeloma     Regurgitation of food     Rib fracture right May 2023

## 2023-12-31 NOTE — ED PROVIDER NOTE - OBJECTIVE STATEMENT
88-year-old female history of multiple myeloma complaining of flulike symptoms for 1 day with associated cough and now having mid abdominal pain.  Admits to some nausea no vomiting.  Denies any fever or chills.  No medications taken for this.  Admits to drinking and eating less today.

## 2024-01-05 ENCOUNTER — APPOINTMENT (OUTPATIENT)
Dept: ORTHOPEDIC SURGERY | Facility: CLINIC | Age: 89
End: 2024-01-05

## 2024-01-13 ENCOUNTER — APPOINTMENT (OUTPATIENT)
Dept: CT IMAGING | Facility: CLINIC | Age: 89
End: 2024-01-13

## 2024-01-13 ENCOUNTER — APPOINTMENT (OUTPATIENT)
Dept: ULTRASOUND IMAGING | Facility: CLINIC | Age: 89
End: 2024-01-13

## 2024-01-19 ENCOUNTER — APPOINTMENT (OUTPATIENT)
Dept: ORTHOPEDIC SURGERY | Facility: CLINIC | Age: 89
End: 2024-01-19
Payer: MEDICARE

## 2024-01-19 VITALS — WEIGHT: 140 LBS | BODY MASS INDEX: 27.48 KG/M2 | HEIGHT: 60 IN

## 2024-01-19 DIAGNOSIS — M54.9 DORSALGIA, UNSPECIFIED: ICD-10-CM

## 2024-01-19 PROCEDURE — 99214 OFFICE O/P EST MOD 30 MIN: CPT

## 2024-01-19 NOTE — HISTORY OF PRESENT ILLNESS
[de-identified] : Patient is a 88 year old female who presents for follow-up evaluation of her lower back pain and left lower extremity sxs. Today, she reports pain in lower back pain radiating down her RT leg. Continuing to ambulate with walker.   03.24.23 87 year old female who presents for follow-up evaluation of her lower back pain and left lower extremity sxs. Today, the patient reports she received another epidural injection that provided no relief to her lower back sxs. Patient reports her back sxs have exacerbated recently and have been affecting her daily lifestyle. Patient reports taking Tylenol/Motril for sxs with minor relief.  Denies any saddle anesthesia, bowel/bladder incontinence.   01/30/23 Today the patient states that she is still dealing with some fairly significant back and left lower extremity symptoms. She states she is interested in another epidural injection, she had good relief with the last epidural injection for 2-3 months.  09/01/22 Today the patient states that she is still dealing with some fairly significant back and left lower extremity symptoms.  She does have pain which radiates into her left medial thigh as well as her left lateral thigh, posterior lateral calf.  She does state that the symptoms can be disabling at times.  They are largely unchanged since her last visit.  Unfortunate her last epidural steroid injection did not substantially improve her symptoms.  She is here today to discuss next steps in treatment.  03/25/22 Today the patient states that she has had a return of her lower extremity symptoms.  She does have pain which radiates down her posterior thigh bilaterally, left worse than right.  She recently had an injection in January which did help her symptoms significantly.  Unfortunately her symptoms have returned.  She denies any bowel bladder issues.  She denies any saddle anesthesia.  11/17/21 Today the patient states that overall her symptoms have improved.  She still does have bilateral posterior thigh posterior calf pain.  Left side is worse than her right side.  She denies any bowel bladder issues.  She denies any saddle anesthesia.  She still has decreased walking tolerance.  She is here today to discuss next steps in care.  11/04/21 This is an 86-year-old female that is here today for evaluation of her back and leg symptoms.  She complains of bilateral leg symptoms.  Left is worse than her right side.  She complains of left and right anterior thigh pain.  She also complains of left posterior lateral thigh posterior lateral calf pain.  She complains of intermittent cramping in her legs especially whenever she walks for any prolonged amount of time.  At this point she cannot even walk 1 block due to significant pain.  She has had these symptoms for years but over the past 1 year she has had acute worsening of her overall symptoms.  She denies any bowel bladder issues.  She denies any saddle anesthesia.

## 2024-01-19 NOTE — ADDENDUM
[FreeTextEntry1] :  I, India Barton, acted solely as a scribe for Dr. Eddie Augustin MD on this date 01/19/2024   All medical record entries made by the Scribe were at my, Dr. Eddie Augustin MD., direction and personally dictated by me on 01/19/2024. I have reviewed the chart and agree that the record accurately reflects my personal performance of the history, physical exam, assessment and plan. I have also personally directed, reviewed, and agreed with the chart.

## 2024-01-19 NOTE — ASSESSMENT
[FreeTextEntry1] : I had a lengthy discussion with the patient in regard to their treatment plan and diagnosis. Their symptoms have persisted despite the conservative management they have attempted thus far. As a result, I would like to proceed with a referral to a pain management specialist to see if they are eligible for an epidural injection and other non-operative treatment options. In tandem with this they should continue with physical therapy/home therapy program. The patient can take Tylenol/NSAIDs as needed for pain control if medically able to. I will have patient follow-up in 2 months for repeat clinical evaluation. I encouraged them to follow-up sooner if their symptoms worsen or change in any way.

## 2024-01-19 NOTE — PHYSICAL EXAM
[de-identified] : Lumbar Physical Exam\par  \par  Gait -antalgic gait\par  \par  Station -forward pitched\par  \par  Sagittal balance -positive\par  \par  Compensatory mechanism? -Bent hips and bent knees\par  \par  \par  \par  Reflexes\par  Patellar - normal\par  Gastroc - normal\par  Clonus - No\par  \par  Hip Exam - Normal\par  \par  Straight leg raise - none\par  \par  Pulses - 2+ dp/pt\par  \par  Range of motion - normal\par  \par  Sensation \par  Sensation intact to light touch in L1, L2, L3, L4, L5 and S1 dermatomes bilaterally\par  \par  Motor\par  	IP	Quad	HS	TA	Gastroc	EHL\par  Right	4/5	5/5	5/5	5/5	5/5	5/5\par  Left	4/5	5/5	5/5	5/5	5/5	5/5 [de-identified] : Scoliosis radiographs\par  Approximately 70 degrees of pelvic incidence\par  55 degrees of lumbar lordosis\par  \par  Lumbar radiographs\par  Multiple areas of disc degeneration and facet arthropathy\par  \par  Lumbar MRI\par  L5-S1  spondylolisthesis\par  Moderate to severe spinal stenosis at L5-S1

## 2024-01-29 RX ORDER — LOSARTAN POTASSIUM AND HYDROCHLOROTHIAZIDE 12.5; 5 MG/1; MG/1
50-12.5 TABLET ORAL
Qty: 90 | Refills: 1 | Status: ACTIVE | COMMUNITY
Start: 2022-10-03 | End: 1900-01-01

## 2024-01-31 ENCOUNTER — APPOINTMENT (OUTPATIENT)
Dept: ULTRASOUND IMAGING | Facility: CLINIC | Age: 89
End: 2024-01-31
Payer: MEDICARE

## 2024-01-31 PROCEDURE — 93970 EXTREMITY STUDY: CPT

## 2024-03-25 ENCOUNTER — APPOINTMENT (OUTPATIENT)
Dept: ORTHOPEDIC SURGERY | Facility: CLINIC | Age: 89
End: 2024-03-25
Payer: MEDICARE

## 2024-03-25 ENCOUNTER — NON-APPOINTMENT (OUTPATIENT)
Age: 89
End: 2024-03-25

## 2024-03-25 VITALS
SYSTOLIC BLOOD PRESSURE: 182 MMHG | WEIGHT: 136 LBS | HEART RATE: 65 BPM | BODY MASS INDEX: 26.7 KG/M2 | HEIGHT: 60 IN | DIASTOLIC BLOOD PRESSURE: 82 MMHG

## 2024-03-25 DIAGNOSIS — C90.00 MULTIPLE MYELOMA NOT HAVING ACHIEVED REMISSION: ICD-10-CM

## 2024-03-25 DIAGNOSIS — M54.16 RADICULOPATHY, LUMBAR REGION: ICD-10-CM

## 2024-03-25 PROCEDURE — 99214 OFFICE O/P EST MOD 30 MIN: CPT

## 2024-04-01 NOTE — DISCUSSION/SUMMARY
[Medication Risks Reviewed] : Medication risks reviewed [de-identified] : Patient today presents with intermittent low back pain over the past year.  The pain is 9 out of 10.  X-rays obtained today and MRI obtained previously were independent reviewed by me and findings discussed with the patient and her family member her daughter.  She has multiple compression fractures across the lumbar spine.  It is unclear if there is acute versus subacute and whether they have healed satisfactorily.  Recommended that we Check MRI L spine which was ordered today. trial of gabapentin prescribed for radicular pain associated the spondylolisthesis and likely spinal stenosis of the lumbar spine. consider kyphoplasty for fractures and consideration of lumbar epidural steroid injection for radiculopathy.  The patient was educated regarding their condition, treatment options as well as prescribed course of treatment.  Risks and benefits as well as alternatives to the proposed treatment were also provided to the patient  They were given the opportunity to have all their questions answered to their satisfaction.  Vital signs were reviewed with the patient and the patient was instructed to followup with their primary care provider for further management. There were no PAs or scribes used in the evaluation, exam or treatment plan discussion. The surgeon was the primary evaluating or treating physician as noted above.

## 2024-04-01 NOTE — PHYSICAL EXAM
[Stooped] : stooped [Walker] : ambulates with walker [SLR] : negative straight leg raise [] : Motor: [Motor Strength Lower Extremities] : left (weak) [LE] : Sensory: Intact in bilateral lower extremities [de-identified] : 4 views lumbar spine obtained today demonstrate left-sided lumbar scoliosis measuring approximately 22 degrees from L1-L5.  Diffuse osteopenia.  Pars defect noted at L5-S1 with grade 1-2 spondylolisthesis at L5-S1.  Compression deformity of L4 L1 T12 and T11 which are all age-indeterminate.  No dynamic instability between flexion extension is obvious.  Calcification abdominal aorta.  ___  ACC: [id number] EXAM: MR SPINE LUMBAR WAW IC ORDERED BY: SD PARKS CABRERA  ACC: [id number] EXAM: MR SPINE THORACIC WAW IC ORDERED BY: SD PARKS CABRERA  ACC: [id number] EXAM: MR SPINE CERVICAL WAW IC ORDERED BY: BEAVERMERLYN PARKS CABRERA  PROCEDURE DATE: 07/27/2023    INTERPRETATION: CLINICAL INDICATION: Lumbar compression fractures, multiple myeloma on chemotherapy  Magnetic resonance imaging of the cervical, thoracic, and lumbosacral spine was carried out with sagittal surface coil imaging from C1to S4 using T1 and fast spin echo T2 weighted images with and without fat saturation technique with axial T1 and fast spin echo T2 weighted imaging on a 1.5 Jelly magnet. Post contrast axial and sagittal T1 weighted images were obtained. 6.5 cc of Gadavist were intravenously injected, 1.0 cc were discarded.  The cervical vertebrae are normal in signal intensity. There is mild loss of height of the T7 vertebral bodies suggesting old compression deformities. There is no bony retropulsion or canal compression. After contrast administration there is normal osseous and vascular enhancement.  Evaluation of the thoracic spine demonstrates a marked compression deformity of the T2 vertebral body consistent with vertebral planum with mild bony retropulsion without significant cord compression.  There is a superior endplate compression deformity of T11 and T12 which hasn't abuts benign type of appearance. There is no bony retropulsion. After contrast administration there is nonspecific enhancement of the T11 and T12 vertebral bodies. The the conus terminates normally at the T12-L1 level.  Evaluation of the lumbar spine demonstrates a marked compression deformity of the L1 vertebral body with abnormal signal and a moderate compression deformity of L4 which is nonspecific. There is nonspecific enhancement of the L1 vertebral body. There is mild degenerative anterolisthesis of L4 and L5 with mild degenerative spinal stenosis.  Degenerative facet changes are identified at L5-S1. There is a hypoplastic thecal sac at L5-S1.  The paraspinal soft tissues are unremarkable.  IMPRESSION: Marked compression deformities of T2 and L1 are nonspecific. Superior endplate compression deformities T11,T12 and L4 have a benign type appearance. Mild bony retropulsion at T2 without cord compression. No abnormal intraspinal enhancement.  --- End of Report ---  MARIA DEL CARMEN MIRELES MD; Attending Radiologist This document has been electronically signed. Jul 27 2023 9:01PM [de-identified] : Seen with her daughter Gait -antalgic gait  Station -forward pitched  Sagittal balance -positive  Compensatory mechanism? -Bent hips and bent knees    Reflexes Patellar - normal Gastroc - normal Clonus - No  No groin pain with hip internal/external rotation.

## 2024-04-01 NOTE — HISTORY OF PRESENT ILLNESS
[___ yrs] : [unfilled] year(s) ago [9] : a current pain level of 9/10 [Intermit.] : ~He/She~ states the symptoms seem to be intermittent [Opioid Analgesics] : relieved by opioid analgesics [Worsening] : worsening [de-identified] : Patient presents today with lower back pain that comes and goes x 1 year as per patient's daughter. Patient complains of pain that is radiating down LLE. Patient prescribed Oxycodone prn with some relief from Oncologist.  Is on medications for multiple myeloma dx in 5/2024. Pain in lower back and down left leg posyeriorly

## 2024-04-10 ENCOUNTER — APPOINTMENT (OUTPATIENT)
Dept: MRI IMAGING | Facility: CLINIC | Age: 89
End: 2024-04-10
Payer: MEDICARE

## 2024-04-10 ENCOUNTER — OUTPATIENT (OUTPATIENT)
Dept: OUTPATIENT SERVICES | Facility: HOSPITAL | Age: 89
LOS: 1 days | End: 2024-04-10
Payer: MEDICARE

## 2024-04-10 DIAGNOSIS — M54.50 LOW BACK PAIN, UNSPECIFIED: ICD-10-CM

## 2024-04-10 DIAGNOSIS — Z98.890 OTHER SPECIFIED POSTPROCEDURAL STATES: Chronic | ICD-10-CM

## 2024-04-10 DIAGNOSIS — Z90.89 ACQUIRED ABSENCE OF OTHER ORGANS: Chronic | ICD-10-CM

## 2024-04-10 DIAGNOSIS — S82.892A OTHER FRACTURE OF LEFT LOWER LEG, INITIAL ENCOUNTER FOR CLOSED FRACTURE: Chronic | ICD-10-CM

## 2024-04-10 PROCEDURE — 72148 MRI LUMBAR SPINE W/O DYE: CPT | Mod: 26

## 2024-04-10 PROCEDURE — 72148 MRI LUMBAR SPINE W/O DYE: CPT

## 2024-04-17 ENCOUNTER — APPOINTMENT (OUTPATIENT)
Dept: ORTHOPEDIC SURGERY | Facility: CLINIC | Age: 89
End: 2024-04-17
Payer: MEDICARE

## 2024-04-17 VITALS
BODY MASS INDEX: 30.73 KG/M2 | SYSTOLIC BLOOD PRESSURE: 117 MMHG | WEIGHT: 180 LBS | DIASTOLIC BLOOD PRESSURE: 80 MMHG | HEIGHT: 64 IN | HEART RATE: 74 BPM

## 2024-04-17 VITALS
SYSTOLIC BLOOD PRESSURE: 133 MMHG | BODY MASS INDEX: 26.5 KG/M2 | HEIGHT: 60 IN | DIASTOLIC BLOOD PRESSURE: 73 MMHG | WEIGHT: 135 LBS | HEART RATE: 78 BPM

## 2024-04-17 DIAGNOSIS — S32.000A WEDGE COMPRESSION FRACTURE OF UNSPECIFIED LUMBAR VERTEBRA, INITIAL ENCOUNTER FOR CLOSED FRACTURE: ICD-10-CM

## 2024-04-17 DIAGNOSIS — M48.062 SPINAL STENOSIS, LUMBAR REGION WITH NEUROGENIC CLAUDICATION: ICD-10-CM

## 2024-04-17 DIAGNOSIS — M54.16 RADICULOPATHY, LUMBAR REGION: ICD-10-CM

## 2024-04-17 PROCEDURE — 99214 OFFICE O/P EST MOD 30 MIN: CPT

## 2024-04-17 RX ORDER — GABAPENTIN 100 MG/1
100 CAPSULE ORAL
Qty: 30 | Refills: 2 | Status: ACTIVE | COMMUNITY
Start: 2024-04-17 | End: 1900-01-01

## 2024-04-17 NOTE — REASON FOR VISIT
[Follow-Up Visit] : a follow-up visit for [Family Member] : family member [FreeTextEntry2] : Compression fracture T2 and L1

## 2024-04-17 NOTE — DISCUSSION/SUMMARY
[Medication Risks Reviewed] : Medication risks reviewed [de-identified] : MRI reviewed and findings discussed with patient and her daughter.  She has chronic compression fractures in the thoracic and lumbar spine with no new fractures noted.  She does have spinal listhesis L5-S1 with severe spinal stenosis at that level.  She is reporting primarily some axial low back pain with left radiculopathy. Continue oxycodone prn - urged her to minimize oxycodone use Consider tylenol for arthritis pain tyrial of gabapentin prescribed Schedule left L4, L5 [facility] PT Rx provided as well Given her advanced age the patient and her family are not interested in surgical interventions  This would involve a lumbar laminectomy at the L5-S1 level.  The patient was educated regarding their condition, treatment options as well as prescribed course of treatment.  Risks and benefits as well as alternatives to the proposed treatment were also provided to the patient  They were given the opportunity to have all their questions answered to their satisfaction.  Vital signs were reviewed with the patient and the patient was instructed to followup with their primary care provider for further management. There were no PAs or scribes used in the evaluation, exam or treatment plan discussion. The surgeon was the primary evaluating or treating physician as noted above.

## 2024-04-17 NOTE — HISTORY OF PRESENT ILLNESS
[9] : a current pain level of 9/10 [Daily] : ~He/She~ states the symptoms seem to be occuring daily [Rest] : relieved by rest [de-identified] : Patient is here today to review mri lumbar spine 4/10/2024, [de-identified] : Any type of movement Home Suture Removal Text: Patient was provided instructions on removing sutures and will remove their sutures at home.  If they have any questions or difficulties they will call the office.

## 2024-04-17 NOTE — PHYSICAL EXAM
[Stooped] : stooped [Walker] : ambulates with walker [] : Motor: [Motor Strength Lower Extremities] : left (weak) [LE] : Sensory: Intact in bilateral lower extremities [SLR] : negative straight leg raise [de-identified] : Seen with her daughter Gait -antalgic gait  Station -forward pitched  Sagittal balance -positive  Compensatory mechanism? -Bent hips and bent knees    Reflexes Patellar - normal Gastroc - normal Clonus - No  No groin pain with hip internal/external rotation.    [de-identified] : EXAM: [id number] - MR SPINE LUMBAR - ORDERED BY: CAMMY SHERMAN  PROCEDURE DATE: 04/10/2024  INTERPRETATION: CLINICAL INFORMATION: Low back pain, multiple myeloma  ADDITIONAL CLINICAL INFORMATION: Spondylolisthesis M43.16  TECHNIQUE: Multiplanar, multisequence MRI was performed of the lumbar spine. IV Contrast: NONE  PRIOR STUDIES: 7/27/2023  FINDINGS:  LOCALIZER: No additional findings. BONES: There is slight interval progression of a severe T12 compression deformity (resulting in approximately 65% loss of vertebral body height.) No significant retropulsion or epidural hematoma. There is no significant change in chronic T11 compression deformity, severe L1 compression deformity and moderate L4 compression deformity. Unchanged mild retropulsion of the posterior L1 vertebral body contributing to mild central canal narrowing. ALIGNMENT: There is grade 1 anterolisthesis at L5-S1. SACROILIAC JOINTS/SACRUM: There is no sacral fracture. The SI joints are partially visualized but are intact. CONUS AND CAUDA EQUINA: The distal cord and conus are normal in signal. Conus terminates at L1. VISUALIZED INTRAPELVIC/INTRA-ABDOMINAL SOFT TISSUES: Normal. PARASPINAL SOFT TISSUES: Normal.   INDIVIDUAL LEVELS:  LOWER THORACIC SPINE: No spinal canal or neuroforaminal stenosis.  L1-L2: Mild retropulsion of the L1 vertebral body and mild bilateral facet arthropathy result in mild bilateral neural foraminal narrowing and mild central canal narrowing. L2-L3: Small posterior disc bulge and mild to moderate bilateral facet arthropathy result in mild bilateral neural foraminal narrowing but no significant central canal narrowing. L3-L4: Moderate bilateral facet arthropathy contributes to mild bilateral neural foraminal narrowing but no significant central canal narrowing. L4-L5: Broad-based posterior disc bulge and moderate bilateral facet arthropathy result in mild to moderate bilateral neural foraminal narrowing and mild central canal narrowing. There is bilateral lateral recess stenosis. L5-S1: Grade 1 anterolisthesis. Broad-based posterior disc bulge and moderate bilateral facet arthropathy result in moderate to severe central canal narrowing and mild to moderate bilateral neural foraminal narrowing. There is bilateral lateral recess stenosis.   IMPRESSION:  There is slight interval progression of a severe T12 compression deformity where there is approximately 65% loss of vertebral body height. No significant retropulsion or epidural hematoma. There is no significant change in chronic T11 compression deformity, severe L1 compression deformity and moderate L4 compression deformity.  Multilevel degenerative disease and facet arthropathy of the lumbar spine, most pronounced at L5-S1 where there is moderate to severe central canal narrowing, mild to moderate bilateral neural foraminal narrowing and bilateral lateral recess stenosis. Additional varying degrees of central canal and neural foraminal narrowing, as above. These findings are not significantly changed as compared to 7/27/2023.  --- End of Report ---   JACK GALAN MBA-CARMELINA RIVAS; Attending Radiologist This document has been electronically signed. Apr 15 2024 11:40AM

## 2024-05-13 ENCOUNTER — TRANSCRIPTION ENCOUNTER (OUTPATIENT)
Age: 89
End: 2024-05-13

## 2024-05-17 ENCOUNTER — APPOINTMENT (OUTPATIENT)
Dept: ORTHOPEDIC SURGERY | Facility: HOSPITAL | Age: 89
End: 2024-05-17

## 2024-05-31 NOTE — ASU PATIENT PROFILE, ADULT - NS TRANSFER EYEGLASSES PAIRS
none Z Plasty Text: The lesion was extirpated to the level of the fat with a #15c scalpel blade.  Given the location of the defect, shape of the defect and the proximity to free margins a Z-plasty was deemed most appropriate for repair.  Using a sterile surgical marker, the appropriate transposition arms of the Z-plasty were drawn incorporating the defect and placing the expected incisions within the relaxed skin tension lines where possible.    The area thus outlined was incised deep to adipose tissue with a #15 scalpel blade.  The skin margins were undermined to an appropriate distance in all directions utilizing iris scissors.  The opposing transposition arms were then transposed into place in opposite direction and anchored with interrupted buried subcutaneous sutures.

## 2024-05-31 NOTE — ASU PATIENT PROFILE, ADULT - FALL HARM RISK - HARM RISK INTERVENTIONS

## 2024-06-03 VITALS — HEIGHT: 59 IN | WEIGHT: 134.92 LBS

## 2024-06-03 RX ORDER — MORPHINE SULFATE 50 MG/1
1 CAPSULE, EXTENDED RELEASE ORAL
Refills: 0 | DISCHARGE

## 2024-06-06 ENCOUNTER — TRANSCRIPTION ENCOUNTER (OUTPATIENT)
Age: 89
End: 2024-06-06

## 2024-06-06 ENCOUNTER — OUTPATIENT (OUTPATIENT)
Dept: OUTPATIENT SERVICES | Facility: HOSPITAL | Age: 89
LOS: 1 days | End: 2024-06-06
Payer: COMMERCIAL

## 2024-06-06 VITALS
DIASTOLIC BLOOD PRESSURE: 68 MMHG | SYSTOLIC BLOOD PRESSURE: 132 MMHG | RESPIRATION RATE: 14 BRPM | OXYGEN SATURATION: 99 % | HEART RATE: 61 BPM

## 2024-06-06 DIAGNOSIS — Z98.890 OTHER SPECIFIED POSTPROCEDURAL STATES: Chronic | ICD-10-CM

## 2024-06-06 DIAGNOSIS — C90.00 MULTIPLE MYELOMA NOT HAVING ACHIEVED REMISSION: ICD-10-CM

## 2024-06-06 DIAGNOSIS — S82.892A OTHER FRACTURE OF LEFT LOWER LEG, INITIAL ENCOUNTER FOR CLOSED FRACTURE: Chronic | ICD-10-CM

## 2024-06-06 DIAGNOSIS — Z90.89 ACQUIRED ABSENCE OF OTHER ORGANS: Chronic | ICD-10-CM

## 2024-06-06 PROCEDURE — 85097 BONE MARROW INTERPRETATION: CPT

## 2024-06-06 PROCEDURE — 88305 TISSUE EXAM BY PATHOLOGIST: CPT | Mod: 26,59

## 2024-06-06 PROCEDURE — 20225 BONE BIOPSY TROCAR/NDL DEEP: CPT

## 2024-06-06 PROCEDURE — 88291 CYTO/MOLECULAR REPORT: CPT

## 2024-06-06 PROCEDURE — 88300 SURGICAL PATH GROSS: CPT | Mod: 26,59

## 2024-06-06 PROCEDURE — 88312 SPECIAL STAINS GROUP 1: CPT | Mod: 26

## 2024-06-06 PROCEDURE — 88342 IMHCHEM/IMCYTCHM 1ST ANTB: CPT | Mod: 26,59

## 2024-06-06 PROCEDURE — 88341 IMHCHEM/IMCYTCHM EA ADD ANTB: CPT | Mod: 26

## 2024-06-06 PROCEDURE — 88364 INSITU HYBRIDIZATION (FISH): CPT | Mod: 26

## 2024-06-06 PROCEDURE — 88365 INSITU HYBRIDIZATION (FISH): CPT | Mod: 26

## 2024-06-06 PROCEDURE — 88313 SPECIAL STAINS GROUP 2: CPT | Mod: 26

## 2024-06-06 PROCEDURE — 88189 FLOWCYTOMETRY/READ 16 & >: CPT

## 2024-06-06 PROCEDURE — 77012 CT SCAN FOR NEEDLE BIOPSY: CPT | Mod: 26

## 2024-06-06 RX ORDER — DEXAMETHASONE 0.5 MG/5ML
5 ELIXIR ORAL
Refills: 0 | DISCHARGE

## 2024-06-06 RX ORDER — ASPIRIN/CALCIUM CARB/MAGNESIUM 324 MG
0 TABLET ORAL
Refills: 0 | DISCHARGE

## 2024-06-06 RX ORDER — LOSARTAN POTASSIUM 100 MG/1
0.5 TABLET, FILM COATED ORAL
Refills: 0 | DISCHARGE

## 2024-06-06 RX ORDER — OXYCODONE AND ACETAMINOPHEN 5; 325 MG/1; MG/1
1 TABLET ORAL
Refills: 0 | DISCHARGE

## 2024-06-06 RX ORDER — LENALIDOMIDE 5 MG/1
1 CAPSULE ORAL
Refills: 0 | DISCHARGE

## 2024-06-06 RX ORDER — ACYCLOVIR SODIUM 500 MG
1 VIAL (EA) INTRAVENOUS
Refills: 0 | DISCHARGE

## 2024-06-06 NOTE — ASU DISCHARGE PLAN (ADULT/PEDIATRIC) - ASU DC SPECIAL INSTRUCTIONSFT
Biopsy Discharge    Discharge Instructions  - You have had a bone marrow.  - You may shower in 24 hours. No soaking or swimming until the site is completely healed.  - Keep the area covered and dry for the next 24 hours.  - Do not perform any heavy lifting for the next few days or until the site is healed.  - You may resume your normal diet.  - You may resume your normal medications however you should wait 48 hours before restarting aspirin, plavix, or blood thinners.  - It is normal to experience some pain over the site for the next few days. You may take apply ice to the area (20 minutes on, 20 minutes off) and take Tylenol for that pain. Do not take more frequently than every 6 hours and do not exceed more than 3000mg of Tylenol in a 24 hour period.    - You were given conscious sedation which may make you drowsy, therefore you need someone to stay with you until the morning following the procedure.  - Do not drive, engage in heavy lifting or strenuous activity, or drink any alcoholic beverages for the next 24 hours.   - You may resume normal activity in 24 hours.    Notify your primary physician and/or Interventional Radiology IMMEDIATELY if you experience any of the following       - Fever of 100.4F or 38C       - Chills or Rigors/ Shakes       - Swelling and/or Redness in the area around the biopsy site       - Worsening Pain       - Blood soaked bandages or worsening bleeding       - Lightheadedness and/or dizziness upon standing       - Chest Pain/ Tightness       - Shortness of Breath       - Difficulty walking    If you have a problem that you believe requires IMMEDIATE attention, please go to your NEAREST Emergency Room. If you believe your problem can safely wait until you speak to a physician, please call Interventional Radiology for any concerns.    Please feel free to contact us at [phone] if any problems arise.

## 2024-06-06 NOTE — ASU DISCHARGE PLAN (ADULT/PEDIATRIC) - NS MD DC FALL RISK RISK
For information on Fall & Injury Prevention, visit: [email or web address] OR  [email or web address] OR  https://www.cdc.gov/steadi/patient.html

## 2024-06-06 NOTE — ASU PREOP CHECKLIST - HEART RATE (BEATS/MIN)
Pt arrive in ER per [place] [facility] with c/o headache+nausea 30 minutes pta, denies fever/dizziness/visual changes, sts that she took norco before ems arrival, pt received zofran ODT pta
86

## 2024-06-06 NOTE — H&P ADULT - HISTORY OF PRESENT ILLNESS
Interventional Radiology    HPI: 88y Female with PMHx multiple myeloma, HTN, s/p Hiatal hernia repair (with Dr. Britt & Dr. Mas, 10/22) presents to IR today for bone marrow biopsy per hem/onc recommendation.     Review of Systems:   Constitutional: No fever  Neurological: No headaches  Respiratory: No cough, No shortness of breath  Cardiovascular: No chest pain  Gastrointestinal: No abdominal or epigastric pain. No nausea, vomiting, or hematemesis;  MSK: chronic back pain    Allergies: No Known Allergies    Medications (Abx/Cardiac/Anticoagulation/Blood Products)      Data:    T(C): --  HR: --  BP: --  RR: --  SpO2: --    Physical Exam  General: No acute distress, nontoxic, A&Ox3  Chest: Non labored breathing  Abdomen: Non-distended, non-tender  Extremities: No swelling,     RADIOLOGY & ADDITIONAL TESTS:    Imaging Reviewed by Dr. Esqueda    H & P Note Reviewed from: ___12/31/23____    Plan: 88y Female presents for bone marrow biopsy  -Risks/Benefits/alternatives explained with the patient and/or healthcare proxy and witnessed informed consent obtained.

## 2024-06-17 LAB — HEMATOPATHOLOGY REPORT: SIGNIFICANT CHANGE UP

## 2024-06-18 PROCEDURE — 77012 CT SCAN FOR NEEDLE BIOPSY: CPT

## 2024-06-18 PROCEDURE — 88342 IMHCHEM/IMCYTCHM 1ST ANTB: CPT

## 2024-06-18 PROCEDURE — 88305 TISSUE EXAM BY PATHOLOGIST: CPT

## 2024-06-18 PROCEDURE — 88312 SPECIAL STAINS GROUP 1: CPT

## 2024-06-18 PROCEDURE — 88280 CHROMOSOME KARYOTYPE STUDY: CPT

## 2024-06-18 PROCEDURE — 88341 IMHCHEM/IMCYTCHM EA ADD ANTB: CPT

## 2024-06-18 PROCEDURE — 88285 CHROMOSOME COUNT ADDITIONAL: CPT

## 2024-06-18 PROCEDURE — 88300 SURGICAL PATH GROSS: CPT

## 2024-06-18 PROCEDURE — 20225 BONE BIOPSY TROCAR/NDL DEEP: CPT

## 2024-06-18 PROCEDURE — 88365 INSITU HYBRIDIZATION (FISH): CPT

## 2024-06-18 PROCEDURE — 88271 CYTOGENETICS DNA PROBE: CPT

## 2024-06-18 PROCEDURE — 88185 FLOWCYTOMETRY/TC ADD-ON: CPT

## 2024-06-18 PROCEDURE — 88264 CHROMOSOME ANALYSIS 20-25: CPT

## 2024-06-18 PROCEDURE — 88237 TISSUE CULTURE BONE MARROW: CPT

## 2024-06-18 PROCEDURE — 88184 FLOWCYTOMETRY/ TC 1 MARKER: CPT

## 2024-06-18 PROCEDURE — 88313 SPECIAL STAINS GROUP 2: CPT

## 2024-06-18 PROCEDURE — 87205 SMEAR GRAM STAIN: CPT

## 2024-06-18 PROCEDURE — 85097 BONE MARROW INTERPRETATION: CPT

## 2024-06-18 PROCEDURE — 88364 INSITU HYBRIDIZATION (FISH): CPT

## 2024-06-18 PROCEDURE — 88275 CYTOGENETICS 100-300: CPT

## 2024-06-19 NOTE — CAREGIVER ENGAGEMENT NOTE - CAREGIVER NAME
ACTINIC KERATOSES     Actinic keratoses are areas of sun-damaged skin. There is a small risk that an actinic keratosis could progress into a form of skin cancer called squamous cell carcinoma and are considered to be pre-skin cancers.     Actinic keratosis can be treated in a number of different ways:  Cryotherapy, topical creams, or photodynamic therapy.    Protect the skin from further sun damage will prevent you from getting more actinic keratosis and reduce your risk of getting skin cancer.       You should be extra cautious in the sun by following these recommendations:        Protect yourself from the sun between 10 am to 2 pm when the sun is at its strongest.      Wear protective clothing - hats, long sleeves, long pants.      Apply a sunscreen regularly to exposed skin before going into the sun, using a sun protection factor of 30 or above and one which is able to block both UVA and UVB light.  Re-apply the sun screen.       Protecting your children from the sun in the same way may reduce their risk of developing actinic keratoses.      Avoid artificial sunlamps, including tanning beds.      Be skin aware - examine your own skin every few months and contact the office f you notice something new or changing.      POST-CRYOTHERAPY INSTRUCTIONS  (AKA LIQUID NITROGEN TREATMENT)    Your skin has been treated with liquid nitrogen, also known as cryotherapy.   This creates a superficial burn, like frostbite to the skin, which should destroy the underlying lesion.   You can expect the area to be pink and inflamed for about 48 hours.   Around day 3-4 after treatment the treated site will darken, forms a scab or blister, and then slough away.   KEEP VASELINE PETROLEUM JELLY OR AQUAPHOR on the site at all times during this phase, which speeds the healing process.   Use gentle soap and water to cleanse the skin.  Do not pick at any scabs.   Treated skin usually heals within 1-3 weeks.  It may be pink for up to 3 months,  Katherine and excess pigment is often removed, so the treated areas may be lighter in color than the surrounding skin.

## 2024-06-23 NOTE — H&P PST ADULT - WILL THE PATIENT ACCEPT THE PFIZER COVID-19 VACCINE IF ELIGIBLE AND IT IS AVAILABLE?
Subjective:   Sari Felix is a 29 y.o. female who presents today with   Chief Complaint   Patient presents with    Dental Pain     X 4 days    Otalgia     Left ear       Dental Problems   This is a new problem. Episode onset: 4 days. The problem occurs constantly. The problem has been unchanged. The pain is moderate. Associated symptoms include facial pain. Pertinent negatives include no difficulty swallowing, fever, oral bleeding, sinus pressure or thermal sensitivity. She has tried acetaminophen and NSAIDs for the symptoms. The treatment provided mild relief.     Patient states she is currently on unpaid maternity leave and is waiting to be seen by dentist to have 4 teeth pulled.    PMH:  has a past medical history of PONV (postoperative nausea and vomiting) and Renal disorder.  MEDS:   Current Outpatient Medications:     amoxicillin (AMOXIL) 500 MG Cap, Take 1 Capsule by mouth 3 times a day for 10 days., Disp: 30 Capsule, Rfl: 0    ibuprofen (MOTRIN) 800 MG Tab, Take 1 Tablet by mouth every 8 hours as needed for Mild Pain or Moderate Pain., Disp: 30 Tablet, Rfl: 0    calcium carbonate (TUMS) 500 MG Chew Tab, Chew 500 mg as needed., Disp: , Rfl:     Prenat MV-Min w/Fe-Folate-DHA (PRENATAL COMPLETE PO), Take  by mouth., Disp: , Rfl:   ALLERGIES:   Allergies   Allergen Reactions    Latex      SURGHX:   Past Surgical History:   Procedure Laterality Date    REPEAT C SECTION N/A 2024    Procedure: REPEAT  SECTION;  Surgeon: Sachin Gaviria M.D.;  Location[facility];  Service: Labor and Delivery    GYN SURGERY          OTHER      tonsilectomy     SOCHX:  reports that she quit smoking about 2 years ago. Her smoking use included cigarettes. She has never used smokeless tobacco. She reports that she does not currently use alcohol. She reports that she does not currently use drugs after having used the following drugs: Marijuana and Inhaled. Frequency: 3.00 times per  "week.  FH: Reviewed with patient, not pertinent to this visit.       Review of Systems   Constitutional:  Negative for chills and fever.   HENT:  Positive for dental problem. Negative for sinus pressure.         Dental pain        Objective:   /68   Pulse 77   Temp 35.8 °C (96.5 °F) (Temporal)   Resp 16   Ht 1.575 m (5' 2\")   Wt 67.6 kg (149 lb)   LMP 08/02/2023 (Exact Date)   SpO2 95%   BMI 27.25 kg/m²   Physical Exam  Vitals and nursing note reviewed.   Constitutional:       General: She is not in acute distress.     Appearance: Normal appearance. She is well-developed. She is not ill-appearing or toxic-appearing.   HENT:      Head: Normocephalic and atraumatic.      Right Ear: Hearing normal.      Left Ear: Hearing, tympanic membrane and ear canal normal.      Mouth/Throat:      Mouth: Mucous membranes are moist.      Dentition: Abnormal dentition. Gingival swelling and dental caries present. No dental tenderness or dental abscesses.      Pharynx: Uvula midline. No posterior oropharyngeal erythema or uvula swelling.      Tonsils: No tonsillar exudate or tonsillar abscesses.        Comments: No submandibular swelling or fullness noted at this time.  Cardiovascular:      Rate and Rhythm: Normal rate.   Pulmonary:      Effort: Pulmonary effort is normal.   Musculoskeletal:      Comments: Normal movement in all 4 extremities   Skin:     General: Skin is warm and dry.   Neurological:      Mental Status: She is alert.      Coordination: Coordination normal.   Psychiatric:         Mood and Affect: Mood normal.         Assessment/Plan:   Assessment    1. Dental infection  - amoxicillin (AMOXIL) 500 MG Cap; Take 1 Capsule by mouth 3 times a day for 10 days.  Dispense: 30 Capsule; Refill: 0    Symptoms and presentation appear consistent with dental infection to the left lower gumline around the tooth that needs to be pulled.  Would suggest warm salt water gargles or cold compress to the area to help alleviate " symptoms.  Patient is currently breast-feeding.  She understands possible side effects of medication.  Recommend follow-up with dentist as soon as she can her convenience.      Differential diagnosis, natural history, supportive care, and indications for immediate follow-up discussed.   Patient given instructions and understanding of medications and treatment.    If not improving in 3-5 days, F/U with PCP or return to  if symptoms worsen.    Patient agreeable to plan.      Please note that this dictation was created using voice recognition software. I have made every reasonable attempt to correct obvious errors, but I expect that there are errors of grammar and possibly content that I did not discover before finalizing the note.    Benny Kingsley PA-C     No

## 2024-09-03 NOTE — PROGRESS NOTE ADULT - PROBLEM SELECTOR PROBLEM 1
Closed compression fracture of lumbosacral spine
Closed compression fracture of lumbosacral spine
72
Closed compression fracture of lumbosacral spine

## 2024-09-16 NOTE — ED PROVIDER NOTE - RESPIRATORY, MLM
[facility]                                  Skilled Nursing Facility                   Progress Note     Admit Date: 9/10/2024  SHIN 10/1/2024  [id number]  Principal Problem:  Closed fracture of neck of left femur with routine healing   HPI obtained from patient interview and chart review     Chief Complaint: Re-evaluation of medical treatment and therapy status: Lab review    HPI:   Mrs. Trevino is a 76 year old female PMHx of HTN, HLD, rheumatoid arthritis and DM who presents to SNF following hospitalization for closed impacted proximal right humerus fracture and Valgus impacted left femoral neck fracture  s/p percutaneous screw fixation on 09/07 with Dr. Lugo.  Admission to SNF for secondary weakness and debility.    Interval history: All of today's labs reviewed and are listed below.  24 hr vital sign ranges reviewed and listed below.  24 hour blood glucose range is 124-143.  Epley maneuver performed with patient today to see if this will help with her dizziness, results inconclusive.  Patient denies shortness of breath, abdominal discomfort, nausea, or vomiting.  Patient reports an adequate appetite.  Patient denies dysuria.  Patient reports having regular bowel movements.  Patient progressing slowly with PT/OT 2 to her current weight-bearing restrictions. Continuing to follow and treat all acute and chronic conditions.    Past Medical History: Patient has a past medical history of Diabetes mellitus, Esophageal reflux, History of COVID-19 , January 2022 (03/02/2022), Other and unspecified hyperlipidemia, and Unspecified essential hypertension.    Past Surgical History: Patient has a past surgical history that includes Mastectomy, partial (Left, 3/25/2022); [place] lymph node biopsy (Left, 3/25/2022); Injection for sentinel node identification (Left, 3/25/2022); and Percutaneous pinning of hip (Left, 9/7/2024).    Social History:  Patient reports that she has never smoked. She has never used smokeless tobacco. She reports that she does not drink alcohol and does not use drugs.    Family History: family history includes Alzheimer's disease in her father; Diabetes in her brother and sister; Heart disease in her brother, brother, and brother; Hypertension in her brother and sister.    Allergies: Patient is allergic to cat/feline products, codeine sulfate, dog dander, metformin, erythromycin, flexeril [cyclobenzaprine], and sulfa (sulfonamide antibiotics).    ROS  Constitutional: Negative for fever   Eyes: Negative for blurred vision, double vision   Respiratory: Negative for cough, shortness of breath   Cardiovascular: Negative for chest pain, palpitations, and leg swelling.   Gastrointestinal: Negative for abdominal pain, constipation, diarrhea, nausea, vomiting.   Genitourinary: Negative for dysuria, frequency   Musculoskeletal:  + generalized weakness.  + LLE pain  Skin: Negative for itching and rash.   Neurological: Negative for headaches.  +intermittent dizziness  Psychiatric/Behavioral: Negative for depression. The patient is nervous/anxious.      24 hour Vital Sign Range   Temp:  [98.4 °F (36.9 °C)]   Pulse:  [76]   Resp:  [16-18]   BP: (144-162)/(65-70)   SpO2:  [95 %-96 %]     Current BMI: Body mass index is 34.45 kg/m².    PEx  Constitutional: Patient appears debilitated.  No distress noted  HENT:   Head: Normocephalic and atraumatic.   Eyes: Pupils are equal, round  Neck: Normal range of motion. Neck supple.   Cardiovascular: Normal rate, regular rhythm and normal heart sounds.    Pulmonary/Chest: Effort normal and breath sounds are clear  Abdominal: Soft. Bowel sounds are normal.   Musculoskeletal: Normal range of motion.   Neurological: Alert and oriented to person, place, and time.   Psychiatric: Normal mood and affect. Behavior is normal.   Skin: Skin is warm and dry.  Swelling to bilateral hands.  Surgical dressing to LLE, only  to be removed by Ortho    Recent Labs   Lab 09/16/24  0439      K 3.5      CO2 24   BUN 9   CREATININE 0.6   CALCIUM 9.6   MG 1.8       Recent Labs   Lab 09/16/24  0439   WBC 9.18   RBC 4.55   HGB 13.8   HCT 43.2      MCV 95   MCH 30.3   MCHC 31.9*     Recent Labs   Lab 09/15/24  0711 09/15/24  1111 09/15/24  1607 09/15/24  2155 09/16/24  0716 09/16/24  1056   POCTGLUCOSE 143* 124* 125* 130* 135* 143*        Assessment and Plan:    Closed fracture of neck of left femur with routine healing   s/p left hip pinning on 9/7/2024  - PT/OT, TTWB LLE for 2 weeks followed by gradual porgression to WBAT LLE.   - DVT PPX with apixaban 2.5 mg BID  - Monitor and report drainage to incisional site  - maintain surgical dressing until removed by Orthopedics  - Fall precautions  - Bowel regimen in place, hold for loose or frequent stools  - Ortho RAJESH to see patient intermittently at SNF  - follow-up with orthopedics after discharge     Closed nondisplaced fracture of surgical neck of right humerus  - CT imaging of right shoulder- closed impacted right humeral head and neck fracture.   - Orthopedics consulted and recommending for non operative management  - PT/OT, NWB to NERI, sling for comfort   - follow-up with orthopedics after discharge  - repeat x-ray today without any further dislocation or fracture     Acute postoperative pain  - improved,  continue oxycodone 5 or 10 mg q.4 hours PRN, continue acetaminophen 1000 mg q.8 hours, continue methocarbamol 1000 mg QID      Type 2 diabetes mellitus without complication, without long-term current use of insulin  - last A1c was 6.3 on 09/06/2024  - Accu-Cheks AC/HS, diabetic diet  - home regimen with Jardiance 25 mg daily, Januvia 100 mg daily  - stable, continue low-dose SSI prn    Vertigo  - change meclizine to q.8 hours  - performed Epley maneuver today     Rheumatoid arthritis involving multiple sites with positive rheumatoid factor  - Chronic condition and  controlled.   - Patient on Humira injections as outpatient to treat.   - okay to resume patient able to bring in     Essential hypertension  - stable, continue home metoprolol 25 mg BID     Class 2 obesity due to excess calories without serious comorbidity with body mass index (BMI) of 37.0 to 37.9 in adult  - Body mass index is 34.45 kg/m².. Obesity complicates all aspects of disease management from diagnostic modalities to treatment. Weight loss encouraged and health benefits explained to patient.        Seasonal allergies  - initiated fluticasone nasal spray     Debility   - Continue with PT/OT for gait training and strengthening and restoration of ADL's   - Encourage mobility, OOB in chair, and early ambulation as appropriate  - Fall precautions   - Monitor for bowel and bladder dysfunction  - Monitor for and prevent skin breakdown and pressure ulcers  - Continue DVT prophylaxis with apixaban           Anticipate disposition:  Home with home health     IP [facility] RISK OF UNPLANNED READMISSION Model: MODERATE      Follow-up needed during SNF stay-     Appointment not to send patient to- orthopedics 5/23     Follow-up needed after discharge from SNF: PCP     Future Appointments   Date Time Provider Department Center   9/23/2024 11:30 AM Mariya Lugo MD [facility] SPORTS [place]   9/27/2024  9:30 AM Shanda Maloney MD [facility]   10/8/2024  1:00 PM King Robbins MD [facility] RHEUM [facility]   11/26/2024  8:00 AM LAB, [facility] [place]   12/3/2024  2:15 PM Anni Olguin MD [facility] INFECT [facility]   12/5/2024  1:00 PM JACOBO Villegas IV, MD [facility] OPHTHAL [facility]     I spent 46 minutes reviewing patient records, examining, and counseling the patient/ patient's family with greater than 50% of the time spent in direct patient care and coordination.  Care coordination with staff    Luz Ramirez NP  Department of [facility] Medicine   [facility]     DOS: 9/16/2024        Patient note was created using MModal Dictation.  Any errors in syntax or even information may not have been identified and edited on initial review prior to signing this note.      Breath sounds clear and equal bilaterally.

## 2024-10-15 NOTE — ED PROVIDER NOTE - IV ALTEPLASE EXCL ABS HIDDEN
Therapy: FLOT  Insurance: [employer]      This Pt has coverage for 5FU and Home Disconnect (if staying local) with [facility] through his [employer] Plan, Plan covers 100% no coinsurance will apply. Pt does NOT have coverage for TPA or Onpro, Must go IC if needed.      In reference to [facility] [place] to check FLOT coverage.   Please contact Intake with any questions, [phone] or In Basket pool, [facility] ([phone]).    show

## 2024-11-01 NOTE — ASU PATIENT PROFILE, ADULT - NS PRO PT RIGHT SUPPORT PERSON
Patient: Patricia Gibbs    [id number]  : 1974 -- AGE 50 y.o.    Provider: Bria CASPER- CNP     Location [facility]   Service Date: 24            [facility]  New Visit Note    Virtual or Telephone Consent  A telephone visit (audio only) between the patient (at the originating site) and the provider (at the distant site) was utilized to provide this telehealth service.   Verbal consent was requested and obtained from Patricia Gibbs on this date, 24 for a telehealth visit.     HISTORY OF PRESENT ILLNESS     The patient's referring provider is: No ref. provider found    HISTORY OF PRESENT ILLNESS   Patricia Gibbs is a 50 y.o. 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     I have independently interviewed and examined the patient in the office and reviewed available records.    Current History    Since last visit she reports her respiratory status has been stable.  No acute events.  Reviewed chest x-ray and PFTs with her today.    10/3/24: On today's visit, the patient reports planning to have gastric sleeve surgery.  She reports her respiratory status is stable.  She denies cough, wheeze, shortness of breath, chest tightness and ER visits for breathing issues.    Previous pulmonary history: She has no history of recurrent infections, or lung disease as a child.  She had no previous lung hx, never on oxygen or inhaler therapy.     Inhalers/nebulized medications: never    Hospitalization History: She has not been hospitalized over the last year for breathing related problem.    Sleep history: Denies snoring, apnea, feeling tired during the day or taking naps during the day.         ALLERGIES AND MEDICATIONS     ALLERGIES  Allergies   Allergen Reactions    Bee Venom Protein (Honey Bee) Anaphylaxis    Codeine Hives     From cough-syrup    Montelukast Unknown       MEDICATIONS  Current Outpatient Medications   Medication Sig Dispense Refill    aspirin 81 mg EC tablet Take 1 tablet (81 mg) by mouth once daily.      cetirizine (ZyrTEC) 10 mg tablet Take 1 tablet (10 mg) by mouth once daily in the morning.      clopidogrel (Plavix) 75 mg tablet Take 1 tablet (75 mg) by mouth once daily.      dapagliflozin propanediol (Farxiga) 5 mg Take 1 tablet (5 mg) by mouth once daily. 90 tablet 1    icosapent ethyL (Vascepa) 1 gram capsule Take 2 capsules (2 g) by mouth 2 times a day with meals. 480 capsule 2    levonorgestrel (Mirena) 21 mcg/24 hours (8 yrs) 52 mg IUD by intrauterine route.      lisinopril 10 mg tablet Take 1 tablet (10 mg) by mouth once daily.      metFORMIN  mg 24 hr tablet Take 2 tablets (1,000 mg) by mouth 2 times a day. 360 tablet 0    metoprolol succinate XL (Toprol-XL) 25 mg 24 hr tablet Take 1 tablet (25 mg) by mouth once daily. Do not crush or chew.      multivitamin tablet Take 2 tablets by mouth 2 times a day.      pantoprazole (ProtoNix) 40 mg EC tablet Take 1 tablet (40 mg) by mouth once daily. Do not crush, chew, or split. 90 tablet 1    predniSONE (Deltasone) 20 mg tablet 3 tabs p.o. daily x3, 2 tabs  p.o. daily x3, 1 tab p.o. daily x3, one half tab p.o. daily x4 20 tablet 0    Repatha Syringe 140 mg/mL injection INJECT 1 ML UNDER THE SKIN EVERY 14 DAYS      semaglutide (Ozempic) 0.25 mg or 0.5 mg (2 mg/3 mL) pen injector INJECT 0.5 MG UNDER THE SKIN 1 (ONE) TIME PER WEEK. 3 mL 0     No current facility-administered medications for this visit.         PAST HISTORY     PAST MEDICAL HISTORY  MI in 2019 s/p 1 Stent  DM2  HTN  HLD  GERD    PAST SURGICAL HISTORY  Past Surgical History:   Procedure Laterality Date    CORONARY STENT PLACEMENT  09/2019    on plavix    OTHER  SURGICAL HISTORY  2022    Tonsillectomy    OTHER SURGICAL HISTORY  2022     section       IMMUNIZATION HISTORY  Immunization History   Administered Date(s) Administered    Flu vaccine (IIV4), preservative free *Check age/dose* 2019    Influenza, seasonal, injectable 2015    Moderna SARS-CoV-2 Vaccination 2022    Pfizer Purple Cap SARS-CoV-2 2021, 2021    Pneumococcal polysaccharide vaccine, 23-valent, age 2 years and older (PNEUMOVAX 23) 2018    Tdap vaccine, age 7 year and older (BOOSTRIX, ADACEL) 2018       SOCIAL HISTORY  Tobacco Smokin-32 y/o - 1 pack/week - 1 pack year  Smokeless Tobacco/Vaping: none  Illicit drugs: marjiuana - smoking daily  Alcohol consumption: socially  Pets: 2 cats - 2 dogs    OCCUPATIONAL/ENVIRONMENTAL HISTORY  Occupation: Operation - office work  No known exposure to asbestos, silica, beryllium or inhaled metals.  No exposure to birds or exotic animals.    FAMILY HISTORY  Family History   Problem Relation Name Age of Onset    Other (cva [Other] Depression, [place]) Mother      Coronary artery disease Mother      No Known Problems Father      No Known Problems Sister      Other (Diabetes,HTN) Brother      No Known Problems Son      No Known Problems Maternal Grandmother      Other (Cardiac) Maternal Grandfather      No Known Problems Paternal Grandmother      No Known Problems Paternal Grandfather       Mother- COPD   Maternal uncle - prostate cancer  No family history of autoimmune disorders.    REVIEW OF SYSTEMS     REVIEW OF SYSTEMS  Review of Systems    Constitutional: No fever, no chills, no night sweats.    Eyes: No double vision, no floaters, no dry eyes.   ENT: See HPI.   Neck: No neck stiffness.  Cardiovascular: No sharp chest pain, no heart racing, no leg swelling.  Respiratory: as noted in HPI.   Gastrointestinal: No nausea, no vomiting, no diarrhea.   Musculoskeletal: No joint pain, no back pain.    Integumentary: No rashes or sores.  Neurological: No dizziness, no headaches. Sleeping well.  Psychiatric: No mood changes.   Endocrine: No hot flashes, no cold intolerance, weight is stable.  Hematologic: No easy bruising or bleeding.    PHYSICAL EXAM     VITAL SIGNS:   There were no vitals filed for this visit.        CURRENT WEIGHT: There is no height or weight on file to calculate BMI.    PREVIOUS WEIGHTS:  Wt Readings from Last 3 Encounters:   10/16/24 122 kg (270 lb)   10/03/24 126 kg (278 lb)   09/18/24 122 kg (269 lb)       Physical Exam    Constitutional: General appearance: Alert and oriented.  No acute distress. Obese.  Head and face: Symmetric  ENT: external inspection of ear and nose normal. No intranasal polyps. No oropharyngeal exudates.    Oropharynx: normal   Neck: supple, no lymphadenopathy  Pulmonary: Chest is normal. No increased work of breathing or signs of respiratory distress. Clear to auscultation bilaterally - no crackles, wheezing, or rhonchi.   Cardiovascular: Heart rate and rhythm normal. Normal S1, S2 - no murmurs, gallops, or pericardial rub.   Abdomen: Soft, non tender, +BS  Extremities: No edema. No clubbing or cyanosis of the fingernails.    Neurologic: Moves all four extremities   MSK: Normal movements of extremities. Gait normal   Psychiatric: Intact judgement and insight.    RESULTS/DATA     Pulmonary Function Test Results               Chest Radiograph     No results found for this or any previous visit from the past 2000 days.      Chest CT Scan     No results found for this or any previous visit from the past 365 days.      Echocardiogram     No results found for this or any previous visit from the past 365 days.       Labwork     Lab Results   Component Value Date    WBC 8.8 12/11/2023    HGB 13.0 12/11/2023    HCT 42.3 12/11/2023    MCV 83 12/11/2023     12/11/2023      Lab Results   Component Value Date    GLUCOSE 99 12/11/2023    CALCIUM 9.4 12/11/2023      "(L) 12/11/2023    K 5.2 12/11/2023    CO2 25 12/11/2023     12/11/2023    BUN 23 12/11/2023    CREATININE 1.12 (H) 12/11/2023      Lab Results   Component Value Date    ALT 12 12/11/2023    AST 17 12/11/2023    ALKPHOS 62 12/11/2023    BILITOT 0.4 12/11/2023        Protime   Date/Time Value Ref Range Status   12/11/2023 03:32 PM 10.4 9.8 - 12.8 seconds Final     INR   Date/Time Value Ref Range Status   12/11/2023 03:32 PM 0.9 0.9 - 1.1 Final       No results found for: \"ICIGE\", \"IGE\", \"ICA04\", \"ASPFU\", \"IGG\", \"IGA\", \"IGM\"    Peripheral Eosinophil Count/Percentage:   Eosinophils Absolute (x10*3/uL)   Date Value   12/11/2023 0.13     Eosinophils % (%)   Date Value   12/11/2023 1.5       ASSESSMENT/PLAN   Ms. Gibbs is a 50 y.o. female, with a history of MI 2019 s/p stent, obesity, hypertension, hyperlipidemia, type 2 diabetes and GERD, who is a current daily marijuana smoker, former social cigarette smoker (1 pack year), who presents to a [facility] for an initial evaluation for bariatric clearance.     Problem List and Orders  There are no diagnoses linked to this encounter.      Assessment and Plan / Recommendations:  Problem List Items Addressed This Visit    None       # Preoperative evaluation:  Patient is at increased risk of postoperative pulmonary complications given American Society of Anesthesiologists [ASA] class 2.  However this increased risk should not preclude the surgery.    - CXR with No acute cardiopulmonary process is evident.    - PFTs normal spirometry and lung volumes.    - To minimize the risk for complications we recommend the following: incentive spirometry to be started before surgery, smoking cessation for at least 8 weeks before surgery,  Use of CPAP machine , early ambulation, minimize sedation, DVT prophylaxis if appropriate per surgical team.   -  can call pulmonary consult while inpatient if needed     Preop risk assessment:  --- ARISCAT score " 16  pts =  LOW risk - 1.6 % risk of in- hospital post-op pulmonary complications.   --- Per arozullah respiratory failure index - Estimated risk probability for postoperative respiratory failure 0.22 %.     ---->>>>  Surgical length    - bariatric surgery sleeve 1-2, bypass 2-3     ASA II   - A patient with mild systemic disease   - Mild diseases only without substantive functional limitations.   - Current smoker, social alcohol drinker, pregnancy, obesity (30<BMI<40), well-controlled DM/HTN, mild lung disease   - Asymptomatic congenital cardiac disease, well controlled dysrhythmias, asthma without exacerbation, well controlled epilepsy, non-insulin dependent diabetes mellitus, abnormal BMI percentile for age, mild/moderate MARCUS, oncologic state in remission, autism with mild limitations      Follow up as needed.    If you have any questions please call the office [phone]    Thank you for visiting the Pulmonary clinic today!   Bria Berg CNP  [phone]     same name as above

## 2024-12-03 ENCOUNTER — APPOINTMENT (OUTPATIENT)
Dept: RADIOLOGY | Facility: CLINIC | Age: 89
End: 2024-12-03
Payer: MEDICARE

## 2024-12-03 ENCOUNTER — OUTPATIENT (OUTPATIENT)
Dept: OUTPATIENT SERVICES | Facility: HOSPITAL | Age: 89
LOS: 1 days | End: 2024-12-03
Payer: MEDICARE

## 2024-12-03 DIAGNOSIS — D80.1 NONFAMILIAL HYPOGAMMAGLOBULINEMIA: ICD-10-CM

## 2024-12-03 DIAGNOSIS — D63.8 ANEMIA IN OTHER CHRONIC DISEASES CLASSIFIED ELSEWHERE: ICD-10-CM

## 2024-12-03 DIAGNOSIS — E83.52 HYPERCALCEMIA: ICD-10-CM

## 2024-12-03 DIAGNOSIS — Z98.890 OTHER SPECIFIED POSTPROCEDURAL STATES: Chronic | ICD-10-CM

## 2024-12-03 DIAGNOSIS — C90.00 MULTIPLE MYELOMA NOT HAVING ACHIEVED REMISSION: ICD-10-CM

## 2024-12-03 PROCEDURE — 73060 X-RAY EXAM OF HUMERUS: CPT | Mod: 26,LT

## 2024-12-03 PROCEDURE — 73030 X-RAY EXAM OF SHOULDER: CPT | Mod: 26,LT

## 2024-12-03 PROCEDURE — 73000 X-RAY EXAM OF COLLAR BONE: CPT | Mod: 26,LT

## 2024-12-03 PROCEDURE — 71046 X-RAY EXAM CHEST 2 VIEWS: CPT | Mod: 26

## 2024-12-03 PROCEDURE — 71046 X-RAY EXAM CHEST 2 VIEWS: CPT

## 2024-12-03 PROCEDURE — 73000 X-RAY EXAM OF COLLAR BONE: CPT

## 2024-12-03 PROCEDURE — 73060 X-RAY EXAM OF HUMERUS: CPT

## 2024-12-03 PROCEDURE — 73030 X-RAY EXAM OF SHOULDER: CPT

## 2024-12-09 ENCOUNTER — EMERGENCY (EMERGENCY)
Facility: HOSPITAL | Age: 89
LOS: 1 days | Discharge: HOME | End: 2024-12-09
Attending: EMERGENCY MEDICINE | Admitting: EMERGENCY MEDICINE
Payer: COMMERCIAL

## 2024-12-09 VITALS
HEART RATE: 70 BPM | RESPIRATION RATE: 18 BRPM | OXYGEN SATURATION: 97 % | SYSTOLIC BLOOD PRESSURE: 128 MMHG | TEMPERATURE: 98 F | DIASTOLIC BLOOD PRESSURE: 68 MMHG

## 2024-12-09 VITALS
RESPIRATION RATE: 20 BRPM | HEART RATE: 76 BPM | TEMPERATURE: 98 F | OXYGEN SATURATION: 96 % | DIASTOLIC BLOOD PRESSURE: 60 MMHG | WEIGHT: 160.06 LBS | SYSTOLIC BLOOD PRESSURE: 139 MMHG

## 2024-12-09 DIAGNOSIS — Z98.890 OTHER SPECIFIED POSTPROCEDURAL STATES: Chronic | ICD-10-CM

## 2024-12-09 DIAGNOSIS — S82.892A OTHER FRACTURE OF LEFT LOWER LEG, INITIAL ENCOUNTER FOR CLOSED FRACTURE: Chronic | ICD-10-CM

## 2024-12-09 DIAGNOSIS — Z90.89 ACQUIRED ABSENCE OF OTHER ORGANS: Chronic | ICD-10-CM

## 2024-12-09 PROCEDURE — 99283 EMERGENCY DEPT VISIT LOW MDM: CPT

## 2024-12-09 RX ORDER — ACETAMINOPHEN 500MG 500 MG/1
975 TABLET, COATED ORAL ONCE
Refills: 0 | Status: COMPLETED | OUTPATIENT
Start: 2024-12-09 | End: 2024-12-09

## 2024-12-09 RX ADMIN — ACETAMINOPHEN 500MG 975 MILLIGRAM(S): 500 TABLET, COATED ORAL at 14:43

## 2024-12-09 NOTE — ED ADULT NURSE NOTE - NSFALLHARMRISKINTERV_ED_ALL_ED

## 2024-12-09 NOTE — ED PROVIDER NOTE - PROGRESS NOTE DETAILS
outpatient xrays reviewed, no obvious fx or dislocation. patient placed in sling and made appointment for outpatient orthopedist for 12/12 at 8 am

## 2024-12-09 NOTE — ED ADULT NURSE NOTE - OBJECTIVE STATEMENT
patient brought in by daughter. daughter states that the patient is complaining of left shoulder pain. as per the daughter the patient fell from bed x1 week ago. pain has been getting worse over the week.

## 2024-12-09 NOTE — ED ADULT TRIAGE NOTE - RESPIRATORY RATE (BREATHS/MIN)
20 PROCEDIMIENTO DE ASPIRACIÓN MAMARIA - INSTRUCCIONES DE CUIDADO POST      Procedimiento: Derecha aspiración de quiste mamario con Dr. Edwin Sosa puede notar algo de sensibilidad, hematomas o decoloración alrededor del sitio de corona procedimiento. Elias-Fela Solis es normal y debe desaparecer gradualmente shelly los próximos 7 a 10 días.  Ian puede desarrollar firmeza en el sitio de la biopsia que es sensible al tacto.  Un área que es pequeña (por ejemplo, el tamaño de venancio canica) es un hematoma normal y desaparecerá gradualmente. Si observa un área firme que es más mae (por ejemplo, el tamaño de venancio pelota de golf o más mae), póngase en contacto con corona médico.    Si nota sangrado excesivo (sangrado que no puede controlar con 15 minutos de presión directa, continua y firme), enrojecimiento, calor, drenaje o dolor intenso del sitio del procedimiento, póngase en contacto con el médico que ordenó corona procedimiento.    Día del Procedimiento:    • Use un buen sostén de apoyo para el día y la noche del procedimiento.  Si tu pecho todavía está dolorido después de zahira período de tiempo, es posible que sigas usando un sostén de apoyo por la noche.  • Coloque venancio compresa de hielo dentro del sujetador encima del apósito shelly 10 minutos cada hora hasta la hora de acostarse según sea necesario para las molestias.  • Puede brenda Tylenol (acetaminofeno) para malestar.  • NO participe en actividades extenuantes después del procedimiento (deportes, ejercicio, levantamiento pesado).    Atención Continuada:    • Retire la tirita si se rafael de nathaniel y aplique venancio nueva. La tirita se puede quitar al día siguiente.  • Puede ducharse/bañarse de forma normal.  • Puede reanudar la mayoría de las actividades normales al día siguiente.      Si no puede comunicarse con corona médico primario o tiene alguna pregunta o inquietud, comuníquese con:     Breast Imaging Nurse Klaudia/Edna/Joy reese [phone] entre las 8:00- 4:30. Si la  enfermera no está disponible o está fuera, por favor llame al [facility] al [phone]. Después de las 4:00 pm, llame al residente de [facility] al [phone].

## 2024-12-09 NOTE — ED ADULT TRIAGE NOTE - CHIEF COMPLAINT QUOTE
Patient complaining of left shoulder pain s/p fall from bed x1 week ago. Denies hitting head, LoC, or blood thinner use

## 2024-12-09 NOTE — ED PROVIDER NOTE - OBJECTIVE STATEMENT
88 yo female with h/o multiple myeloma (on chemo) presents to the ED c/o left shoulder/clavicle pain  s/p fall 8 days ago. Daughter explains she fell out of bed onto her left side. no head trauma. Patient had routine follow up with her oncolgist Dr. San last week and was sent for outpatient XRs and instructed to follow up with ortho. Patient has been taking tylenol occasionally for pain, did not take any today. Denies fever, chills, chest pain, sob, abd pain, N/V, UE weakness or paresthesias. Shoulder/clavicle pain worse with shoulder movement. outpatient xrays done at [facility] and visible in PACs with no obvious fxs or dislocations.

## 2024-12-09 NOTE — ED PROVIDER NOTE - CARE PROVIDER_API CALL
Ramos Rodríguez.  Orthopaedic Surgery  [address], [address]  [place] [address]  Phone: [phone]  Fax: [phone]  Scheduled Appointment: 12/12/2024 08:00 AM

## 2024-12-09 NOTE — ED PROVIDER NOTE - PATIENT PORTAL LINK FT
You can access the FollowMyHealth Patient Portal offered by [facility] by registering at the following website: [email or web address]. By joining [facility]’s FollowMyHealth portal, you will also be able to view your health information using other applications (apps) compatible with our system.

## 2024-12-09 NOTE — ED PROVIDER NOTE - CLINICAL SUMMARY MEDICAL DECISION MAKING FREE TEXT BOX
Sandhu: 88 yo female with h/o multiple myeloma (on chemo) presents to the ED c/o left shoulder/clavicle pain  s/p fall 8 days ago. Daughter explains she fell out of bed onto her left side. no head trauma. Patient had routine follow up with her oncolgist Dr. San last week and was sent for outpatient XRs and instructed to follow up with ortho. Patient has been taking tylenol occasionally for pain, did not take any today. Denies fever, chills, chest pain, sob, abd pain, N/V, UE weakness or paresthesias. Shoulder/clavicle pain worse with shoulder movement. outpatient xrays done at [facility] and visible in PACs with no obvious fxs or dislocations.

## 2024-12-12 ENCOUNTER — APPOINTMENT (OUTPATIENT)
Dept: ORTHOPEDIC SURGERY | Facility: CLINIC | Age: 89
End: 2024-12-12
Payer: MEDICARE

## 2024-12-12 DIAGNOSIS — S43.50XA SPRAIN OF UNSPECIFIED ACROMIOCLAVICULAR JOINT, INITIAL ENCOUNTER: ICD-10-CM

## 2024-12-12 PROCEDURE — G2211 COMPLEX E/M VISIT ADD ON: CPT

## 2024-12-12 PROCEDURE — 99214 OFFICE O/P EST MOD 30 MIN: CPT

## 2025-01-18 ENCOUNTER — RX RENEWAL (OUTPATIENT)
Age: 89
End: 2025-01-18

## 2025-01-28 DIAGNOSIS — I70.0 ATHEROSCLEROSIS OF AORTA: ICD-10-CM

## 2025-02-03 ENCOUNTER — NON-APPOINTMENT (OUTPATIENT)
Age: 89
End: 2025-02-03

## 2025-02-03 ENCOUNTER — APPOINTMENT (OUTPATIENT)
Dept: INTERNAL MEDICINE | Facility: CLINIC | Age: 89
End: 2025-02-03
Payer: MEDICARE

## 2025-02-03 VITALS
TEMPERATURE: 98.3 F | OXYGEN SATURATION: 98 % | DIASTOLIC BLOOD PRESSURE: 76 MMHG | WEIGHT: 147 LBS | HEART RATE: 77 BPM | BODY MASS INDEX: 34.02 KG/M2 | HEIGHT: 55 IN | RESPIRATION RATE: 17 BRPM | SYSTOLIC BLOOD PRESSURE: 131 MMHG

## 2025-02-03 DIAGNOSIS — Z00.00 ENCOUNTER FOR GENERAL ADULT MEDICAL EXAMINATION W/OUT ABNORMAL FINDINGS: ICD-10-CM

## 2025-02-03 DIAGNOSIS — C90.00 MULTIPLE MYELOMA NOT HAVING ACHIEVED REMISSION: ICD-10-CM

## 2025-02-03 DIAGNOSIS — R79.89 OTHER SPECIFIED ABNORMAL FINDINGS OF BLOOD CHEMISTRY: ICD-10-CM

## 2025-02-03 DIAGNOSIS — R73.03 PREDIABETES.: ICD-10-CM

## 2025-02-03 DIAGNOSIS — I10 ESSENTIAL (PRIMARY) HYPERTENSION: ICD-10-CM

## 2025-02-03 PROCEDURE — G0439: CPT

## 2025-02-03 RX ORDER — LENALIDOMIDE 25 MG/1
25 CAPSULE ORAL
Refills: 0 | Status: ACTIVE | COMMUNITY

## 2025-02-03 RX ORDER — ACYCLOVIR 400 MG/1
400 TABLET ORAL
Refills: 0 | Status: ACTIVE | COMMUNITY

## 2025-02-04 LAB
25(OH)D3 SERPL-MCNC: 38.1 NG/ML
ALBUMIN SERPL ELPH-MCNC: 4.4 G/DL
ALP BLD-CCNC: 116 U/L
ALT SERPL-CCNC: 11 U/L
ANION GAP SERPL CALC-SCNC: 12 MMOL/L
APPEARANCE: CLEAR
AST SERPL-CCNC: 15 U/L
BACTERIA: ABNORMAL /HPF
BASOPHILS # BLD AUTO: 0.1 K/UL
BASOPHILS NFR BLD AUTO: 1.4 %
BILIRUB SERPL-MCNC: 0.4 MG/DL
BILIRUBIN URINE: NEGATIVE
BLOOD URINE: NEGATIVE
BUN SERPL-MCNC: 14 MG/DL
CALCIUM SERPL-MCNC: 9.6 MG/DL
CAST: 0 /LPF
CHLORIDE SERPL-SCNC: 95 MMOL/L
CHOLEST SERPL-MCNC: 191 MG/DL
CO2 SERPL-SCNC: 24 MMOL/L
COLOR: YELLOW
CREAT SERPL-MCNC: 0.59 MG/DL
EGFR: 87 ML/MIN/1.73M2
EOSINOPHIL # BLD AUTO: 0.15 K/UL
EOSINOPHIL NFR BLD AUTO: 2.1 %
EPITHELIAL CELLS: 6 /HPF
ESTIMATED AVERAGE GLUCOSE: 111 MG/DL
GLUCOSE QUALITATIVE U: NEGATIVE MG/DL
GLUCOSE SERPL-MCNC: 103 MG/DL
HBA1C MFR BLD HPLC: 5.5 %
HCT VFR BLD CALC: 38.2 %
HDLC SERPL-MCNC: 60 MG/DL
HGB BLD-MCNC: 12.7 G/DL
IMM GRANULOCYTES NFR BLD AUTO: 0.8 %
KETONES URINE: NEGATIVE MG/DL
LDLC SERPL CALC-MCNC: 115 MG/DL
LEUKOCYTE ESTERASE URINE: ABNORMAL
LYMPHOCYTES # BLD AUTO: 3.49 K/UL
LYMPHOCYTES NFR BLD AUTO: 47.9 %
MAN DIFF?: NORMAL
MCHC RBC-ENTMCNC: 28.9 PG
MCHC RBC-ENTMCNC: 33.2 G/DL
MCV RBC AUTO: 87 FL
MICROSCOPIC-UA: NORMAL
MONOCYTES # BLD AUTO: 0.61 K/UL
MONOCYTES NFR BLD AUTO: 8.4 %
NEUTROPHILS # BLD AUTO: 2.88 K/UL
NEUTROPHILS NFR BLD AUTO: 39.4 %
NITRITE URINE: NEGATIVE
NONHDLC SERPL-MCNC: 131 MG/DL
PH URINE: >=9
PLATELET # BLD AUTO: 236 K/UL
POTASSIUM SERPL-SCNC: 4 MMOL/L
PROT SERPL-MCNC: 6.5 G/DL
PROTEIN URINE: NORMAL MG/DL
RBC # BLD: 4.39 M/UL
RBC # FLD: 15.9 %
RED BLOOD CELLS URINE: 1 /HPF
SODIUM SERPL-SCNC: 132 MMOL/L
SPECIFIC GRAVITY URINE: 1.02
T4 FREE SERPL-MCNC: 1.4 NG/DL
TRIGL SERPL-MCNC: 89 MG/DL
TSH SERPL-ACNC: 2.59 UIU/ML
UROBILINOGEN URINE: 0.2 MG/DL
VIT B12 SERPL-MCNC: 574 PG/ML
WBC # FLD AUTO: 7.29 K/UL
WHITE BLOOD CELLS URINE: 2 /HPF

## 2025-05-12 NOTE — ED PROVIDER NOTE - IV ALTEPLASE ADMIN OUTSIDE HIDDEN
Not feeling your best?  Where to go for the right care at the right time.    Dear Elton Ross II   I wanted to provide you with some information that might help you seek care for your condition when your primary care provider or specialist is unavailable. If you have a need outside of normal business hours, you should first contact your primary care office or specialist caring for your condition. They may have on-call providers that could assist with your care. During office hours, you may request a virtual or same day appointment.   But what if your primary care provider is not in the office that day and you can't wait until the  next day for care? In that situation, your next option is to visit an urgent care facility.          [facility] now has urgent care sites open to support our community.   [facility] is a great alternative when you need immediate medical care that is not a serious threat to your health or your doctor's office is closed or unable to get you in for an appointment. The urgent care centers offer fast access to [facility] doctors for minor illnesses and injuries for patients of all ages. There are other medical services available including lab testing, X-rays, EKGs, and IV fluids.  Locations are open daily from 8 a.m. - 8 p.m.     [facility]  [address], [place], [place] [address]  [phone]    [address], [place], [place] [address]  [phone]    Local Urgent Care     [facility] 8:30 am - 7:70 pm   [address] [place] [address]  [phone]    [facility]     8 am - 8 pm   [address] [address]  [phone]    [facility] 7 am - 7:30 pm  [address] [place] [address]  [phone]     [facility] [place] 7 am - 7:30 pm  [address] [place] [address]  [phone]    [place]reet  show

## 2025-05-13 ENCOUNTER — APPOINTMENT (OUTPATIENT)
Dept: RADIOLOGY | Facility: CLINIC | Age: 89
End: 2025-05-13
Payer: MEDICARE

## 2025-05-13 PROCEDURE — 72070 X-RAY EXAM THORAC SPINE 2VWS: CPT

## 2025-05-13 PROCEDURE — 72114 X-RAY EXAM L-S SPINE BENDING: CPT

## 2025-05-13 PROCEDURE — 77075 RADEX OSSEOUS SURVEY COMPL: CPT

## 2025-05-30 NOTE — ED ADULT TRIAGE NOTE - HISTORY OF COVID-19 VACCINATION
Patient scheduled physical for 25  Please enter order for labs (previous orders  )  Please notify patient via [employer] when orders in place  Tasked to nursing   
Please advise on pending labs for annual physical thanks -thanks   
Vaccine status unknown

## 2025-07-08 NOTE — ED PROVIDER NOTE - ATTENDING APP SHARED VISIT CONTRIBUTION OF CARE
--DO NOT REPLY - Sent from PACT - If sent to wrong pool, reroute to [facility] Reroute pool --    Message Type:  Refill Medication   Is the medication pended:Yes  Medication name: Semaglutide, 2 MG/DOSE, (Ozempic, 2 MG/DOSE,) 8 MG/3ML Solution Pen-injector  Message: Patient has appointment on November 19 and patient has 1 left  Preferred pharmacy verified, and selected.  [id number] - [place], [place] - [address]  Call Back #: [phone]  Can a detailed message be left?  Yes - Voicemail   Is the patient OUT of Medication?  Yes: Working Hours: route as HIGH priority according to KB. Patient has been advised the message will be reviewed within 1 business dayCopied from CRM #[id number]. Topic: MW Medication/Rx - MW Rx Refill  >> Jul 8, 2025 11:31 AM Rebecca DOWNING wrote:  Ross Limon called to request a medication refill that is Out of medication or is critically low in meds during    Working Hours [facility] Clinical to process refill.   >ADDED NOTE / CREATED CUSTOM CLINICAL CALL  >Reason for call 'Refill Request'  >Sent HIGH priority to [facility] CLINICAL MSG POOL [[phone]].   Patient is an 87-year-old female with a history of HLD, DM, chronic sciatica and low back pain with radiculopathy.  She presents to the emergency room today with severe low back pain radiating down her legs.  In addition to this pain that she had for 4 days she also has epigastric pain chest pain nausea and dyspnea on exertion.  She has no prior history of cardiac issues.  Not a smoker or drinker.  Denies trauma.  States has been urinating more frequently.  But has no loss of bowel control.    Discussion with the PA who primarily saw the patient the patient was incontinent of urine, but had normal rectal tone and normal motor strength.  Patient is a well-developed well-nourished female no apparent distress when laying still.  But on turning moving sitting up.  Patient grimaces secondary to pain in her low back.  HEENT is unremarkable.  No G/F/R.  No pallor.  No cyanosis.  Neck is supple.  Mucous membranes moist.  Cardiopulmonary exam is unremarkable.  Abdominal exam patient has trocar scars from prior abdominal surgery.  And some mild nonspecific abdominal pain.  On examination of her spine.  Patient has diffuse upper lumbar pain across her back.  She has straight leg raise in both legs.  But the legs are neurologic and vascular intact.  The perineum was not examined as the exam was deferred to the PA exam prior.  Neurologic exam patient has pain with straight leg raise.  No other focal neurological findings.  Skin exam is unremarkable.  There is no rash.    Plan of care includes pain control due to the sciatica.  Given her onset of chest pain epigastric pain with nausea and shortness of breath exclude ACS.  Given the back pain that radiates to her abdomen must exclude aortic dissection will CT image the chest abdomen and pelvis with IV contrast.  Obtain laboratory studies of troponin, lipase, CBC and electrolytes.  Renal function test.  Neuro urinalysis.  EKG.  Parenteral pain control.  And disposition accordingly.  This chart was made with dictation software and may contain typographical errors.

## 2025-09-09 ENCOUNTER — EMERGENCY (EMERGENCY)
Facility: HOSPITAL | Age: 89
LOS: 1 days | End: 2025-09-09
Attending: EMERGENCY MEDICINE | Admitting: EMERGENCY MEDICINE
Payer: COMMERCIAL

## 2025-09-09 VITALS
SYSTOLIC BLOOD PRESSURE: 143 MMHG | OXYGEN SATURATION: 96 % | DIASTOLIC BLOOD PRESSURE: 114 MMHG | HEART RATE: 94 BPM | RESPIRATION RATE: 18 BRPM | TEMPERATURE: 98 F | WEIGHT: 147.05 LBS | HEIGHT: 60 IN

## 2025-09-09 VITALS
HEART RATE: 82 BPM | DIASTOLIC BLOOD PRESSURE: 71 MMHG | OXYGEN SATURATION: 96 % | TEMPERATURE: 98 F | SYSTOLIC BLOOD PRESSURE: 122 MMHG | RESPIRATION RATE: 18 BRPM

## 2025-09-09 DIAGNOSIS — Z90.89 ACQUIRED ABSENCE OF OTHER ORGANS: Chronic | ICD-10-CM

## 2025-09-09 DIAGNOSIS — Z98.890 OTHER SPECIFIED POSTPROCEDURAL STATES: Chronic | ICD-10-CM

## 2025-09-09 DIAGNOSIS — S82.892A OTHER FRACTURE OF LEFT LOWER LEG, INITIAL ENCOUNTER FOR CLOSED FRACTURE: Chronic | ICD-10-CM

## 2025-09-09 PROCEDURE — 99283 EMERGENCY DEPT VISIT LOW MDM: CPT

## 2025-09-09 PROCEDURE — 99284 EMERGENCY DEPT VISIT MOD MDM: CPT | Mod: 25

## 2025-09-09 RX ORDER — PREDNISONE 20 MG/1
2 TABLET ORAL
Qty: 8 | Refills: 0
Start: 2025-09-09 | End: 2025-09-12

## 2025-09-09 RX ORDER — PREDNISONE 20 MG/1
50 TABLET ORAL ONCE
Refills: 0 | Status: COMPLETED | OUTPATIENT
Start: 2025-09-09 | End: 2025-09-09

## 2025-09-09 RX ORDER — DIPHENHYDRAMINE HCL 12.5MG/5ML
50 ELIXIR ORAL ONCE
Refills: 0 | Status: COMPLETED | OUTPATIENT
Start: 2025-09-09 | End: 2025-09-09

## 2025-09-09 RX ADMIN — Medication 20 MILLIGRAM(S): at 02:32

## 2025-09-09 RX ADMIN — Medication 50 MILLIGRAM(S): at 03:27

## 2025-09-09 RX ADMIN — PREDNISONE 50 MILLIGRAM(S): 20 TABLET ORAL at 02:32

## 2025-09-09 RX ADMIN — Medication 10 MILLIGRAM(S): at 02:32

## (undated) DEVICE — VENODYNE/SCD SLEEVE CALF MEDIUM

## (undated) DEVICE — PLV-STRYKER LAPAROSCOPE 5MM 30 DEGREE: Type: DURABLE MEDICAL EQUIPMENT

## (undated) DEVICE — SOL IRR POUR H2O 1000ML

## (undated) DEVICE — BRUSH COLONOSCOPY CYTOLOGY

## (undated) DEVICE — SYR LUER LOK 20CC

## (undated) DEVICE — BITE BLOCK ADULT 20 X 27MM (GREEN)

## (undated) DEVICE — PLV-SCD MACHINE: Type: DURABLE MEDICAL EQUIPMENT

## (undated) DEVICE — DRAPE 3/4 SHEET W REINFORCEMENT 56X77"

## (undated) DEVICE — FOLEY HOLDER STATLOCK 2 WAY ADULT

## (undated) DEVICE — BLADE SCALPEL SAFETY #15 WITH PLASTIC GREEN HANDLE

## (undated) DEVICE — STAPLER COVIDIEN ENDO GIA STANDARD HANDLE

## (undated) DEVICE — TROCAR COVIDIEN VERSAONE FIXATION CANNULA 5MM

## (undated) DEVICE — PACK IV START WITH CHG

## (undated) DEVICE — CATH IV SAFE BC 20G X 1.16" (PINK)

## (undated) DEVICE — LAP PAD 4 X 18"

## (undated) DEVICE — SYR LUER LOK 5CC

## (undated) DEVICE — TUBING SUCTION CONN 6FT STERILE

## (undated) DEVICE — TROCAR COVIDIEN VERSAONE BLUNT TIP HASSAN 12MM

## (undated) DEVICE — TROCAR COVIDIEN VERSAPORT BLADELESS OPTICAL 5MM STANDARD

## (undated) DEVICE — DRAPE TOWEL BLUE 17" X 24"

## (undated) DEVICE — SUT MONOCRYL 3-0 18" PS-2 UNDYED

## (undated) DEVICE — PLV/PSP-SUCTION IRRIGATOR STRYKER: Type: DURABLE MEDICAL EQUIPMENT

## (undated) DEVICE — SYR LUER LOK 50CC

## (undated) DEVICE — NDL HYPO SAFE 25G X 1.5" (ORANGE)

## (undated) DEVICE — SHEARS COVIDIEN ENDO SHEAR 5MM X 31CM W UNIPOLAR CAUTERY

## (undated) DEVICE — SYR LUER LOK 10CC

## (undated) DEVICE — SUT POLYSORB 0 30" GU-46

## (undated) DEVICE — TROCAR COVIDIEN VERSAPORT BLADELESS OPTICAL 12MM STANDARD

## (undated) DEVICE — Device

## (undated) DEVICE — FORCEP RADIAL JAW 4 JUMBO 2.8MM 3.2MM 240CM ORANGE DISP

## (undated) DEVICE — FORCEP MULTIBITE MULTIPLE SAMPLE 2.4MM 2.8MM 240CM ORANGE DISP

## (undated) DEVICE — GLV 7 PROTEXIS (WHITE)

## (undated) DEVICE — SYR ALLIANCE II INFLATION 60ML

## (undated) DEVICE — SOL IRR POUR NS 0.9% 1000ML

## (undated) DEVICE — BIOPSY FORCEP RADIAL JAW 4 STANDARD WITH NEEDLE

## (undated) DEVICE — D HELP - CLEARVIEW CLEARIFY SYSTEM

## (undated) DEVICE — SUT SOFSILK 0 30" V-20

## (undated) DEVICE — BALLOON US ENDO

## (undated) DEVICE — PLV/PSP-ESU T7J19648DX: Type: DURABLE MEDICAL EQUIPMENT

## (undated) DEVICE — TUBING IV SET GRAVITY 3Y 100" MACRO

## (undated) DEVICE — TUBING STRYKEFLOW II SUCTION / IRRIGATOR

## (undated) DEVICE — SENSOR O2 FINGER ADULT

## (undated) DEVICE — NDL COUNTER FOAM AND MAGNET 20-40

## (undated) DEVICE — ENDOCATCH 10MM SPECIMEN POUCH

## (undated) DEVICE — POSITIONER PINK PAD PIGAZZI SYSTEM

## (undated) DEVICE — SUCTION YANKAUER NO CONTROL VENT

## (undated) DEVICE — SOL IRR BAG NS 0.9% 3000ML

## (undated) DEVICE — SAFETY PIN 1.5" MED

## (undated) DEVICE — PLV-STRYKER LAPAROSCOPE 10MM 30 DEGREE: Type: DURABLE MEDICAL EQUIPMENT

## (undated) DEVICE — TUBING SUCTION 20FT

## (undated) DEVICE — CLAMP BX HOT RAD JAW 3

## (undated) DEVICE — WARMING BLANKET UPPER ADULT

## (undated) DEVICE — PACK GENERAL LAPAROSCOPY

## (undated) DEVICE — TUBING STRYKER PNEUMOCLEAR HIGH FLOW

## (undated) DEVICE — VENODYNE/SCD SLEEVE CALF LARGE

## (undated) DEVICE — TROCAR COVIDIEN VERSAPORT BLADELESS OPTICAL 11MM STANDARD

## (undated) DEVICE — SOL INJ NS 0.9% 500ML 2 PORT

## (undated) DEVICE — SHEARS HARMONIC ACE 5MM X 36CM CURVED TIP

## (undated) DEVICE — DRAIN RESERVOIR FOR JACKSON PRATT 100CC CARDINAL

## (undated) DEVICE — IRRIGATOR BIO SHIELD

## (undated) DEVICE — COLONOSCOPE 2416901: Type: DURABLE MEDICAL EQUIPMENT

## (undated) DEVICE — CATH IV SAFE BC 22G X 1" (BLUE)

## (undated) DEVICE — DRSG DERMABOND 0.7ML

## (undated) DEVICE — ELCTR BOVIE TIP BLADE INSULATED 2.75" EDGE